# Patient Record
Sex: FEMALE | Race: WHITE | NOT HISPANIC OR LATINO
[De-identification: names, ages, dates, MRNs, and addresses within clinical notes are randomized per-mention and may not be internally consistent; named-entity substitution may affect disease eponyms.]

---

## 2017-05-09 ENCOUNTER — APPOINTMENT (OUTPATIENT)
Dept: DERMATOLOGY | Facility: CLINIC | Age: 77
End: 2017-05-09

## 2017-05-09 VITALS — HEIGHT: 63 IN | BODY MASS INDEX: 38.62 KG/M2 | WEIGHT: 218 LBS

## 2017-05-09 DIAGNOSIS — Z87.09 PERSONAL HISTORY OF OTHER DISEASES OF THE RESPIRATORY SYSTEM: ICD-10-CM

## 2017-05-09 DIAGNOSIS — Z87.891 PERSONAL HISTORY OF NICOTINE DEPENDENCE: ICD-10-CM

## 2017-05-09 DIAGNOSIS — Z86.69 PERSONAL HISTORY OF OTHER DISEASES OF THE NERVOUS SYSTEM AND SENSE ORGANS: ICD-10-CM

## 2017-09-11 ENCOUNTER — RX RENEWAL (OUTPATIENT)
Age: 77
End: 2017-09-11

## 2018-01-13 ENCOUNTER — RX RENEWAL (OUTPATIENT)
Age: 78
End: 2018-01-13

## 2018-03-06 ENCOUNTER — RX RENEWAL (OUTPATIENT)
Age: 78
End: 2018-03-06

## 2018-04-02 ENCOUNTER — MESSAGE (OUTPATIENT)
Age: 78
End: 2018-04-02

## 2018-04-02 DIAGNOSIS — Z12.31 ENCOUNTER FOR SCREENING MAMMOGRAM FOR MALIGNANT NEOPLASM OF BREAST: ICD-10-CM

## 2018-04-13 ENCOUNTER — APPOINTMENT (OUTPATIENT)
Dept: OBGYN | Facility: CLINIC | Age: 78
End: 2018-04-13
Payer: MEDICARE

## 2018-04-13 VITALS
WEIGHT: 118 LBS | BODY MASS INDEX: 20.91 KG/M2 | SYSTOLIC BLOOD PRESSURE: 108 MMHG | DIASTOLIC BLOOD PRESSURE: 62 MMHG | HEIGHT: 63 IN

## 2018-04-13 DIAGNOSIS — Z87.09 PERSONAL HISTORY OF OTHER DISEASES OF THE RESPIRATORY SYSTEM: ICD-10-CM

## 2018-04-13 PROCEDURE — 82270 OCCULT BLOOD FECES: CPT

## 2018-04-13 PROCEDURE — G0101: CPT

## 2018-04-19 LAB — CYTOLOGY CVX/VAG DOC THIN PREP: NORMAL

## 2018-04-20 ENCOUNTER — CLINICAL ADVICE (OUTPATIENT)
Age: 78
End: 2018-04-20

## 2018-04-23 ENCOUNTER — CLINICAL ADVICE (OUTPATIENT)
Age: 78
End: 2018-04-23

## 2018-04-25 ENCOUNTER — APPOINTMENT (OUTPATIENT)
Dept: MAMMOGRAPHY | Facility: CLINIC | Age: 78
End: 2018-04-25

## 2018-05-01 ENCOUNTER — FORM ENCOUNTER (OUTPATIENT)
Age: 78
End: 2018-05-01

## 2018-05-02 ENCOUNTER — APPOINTMENT (OUTPATIENT)
Dept: MAMMOGRAPHY | Facility: CLINIC | Age: 78
End: 2018-05-02
Payer: MEDICARE

## 2018-05-02 ENCOUNTER — APPOINTMENT (OUTPATIENT)
Dept: RADIOLOGY | Facility: CLINIC | Age: 78
End: 2018-05-02
Payer: MEDICARE

## 2018-05-02 ENCOUNTER — APPOINTMENT (OUTPATIENT)
Dept: ULTRASOUND IMAGING | Facility: CLINIC | Age: 78
End: 2018-05-02
Payer: MEDICARE

## 2018-05-02 ENCOUNTER — OUTPATIENT (OUTPATIENT)
Dept: OUTPATIENT SERVICES | Facility: HOSPITAL | Age: 78
LOS: 1 days | End: 2018-05-02
Payer: MEDICARE

## 2018-05-02 DIAGNOSIS — Z00.8 ENCOUNTER FOR OTHER GENERAL EXAMINATION: ICD-10-CM

## 2018-05-02 PROCEDURE — 77080 DXA BONE DENSITY AXIAL: CPT

## 2018-05-02 PROCEDURE — 77080 DXA BONE DENSITY AXIAL: CPT | Mod: 26

## 2018-05-02 PROCEDURE — 77067 SCR MAMMO BI INCL CAD: CPT | Mod: 26

## 2018-05-02 PROCEDURE — 77067 SCR MAMMO BI INCL CAD: CPT

## 2018-05-02 PROCEDURE — 77063 BREAST TOMOSYNTHESIS BI: CPT | Mod: 26

## 2018-05-02 PROCEDURE — 77063 BREAST TOMOSYNTHESIS BI: CPT

## 2018-06-15 ENCOUNTER — APPOINTMENT (OUTPATIENT)
Dept: DERMATOLOGY | Facility: CLINIC | Age: 78
End: 2018-06-15
Payer: MEDICARE

## 2018-06-15 PROCEDURE — 0050D: CPT

## 2018-06-15 PROCEDURE — 99213 OFFICE O/P EST LOW 20 MIN: CPT | Mod: 25

## 2018-06-15 PROCEDURE — 17000 DESTRUCT PREMALG LESION: CPT

## 2018-06-15 RX ORDER — ESTROGENS, CONJUGATED 0.62 MG/1
0.62 TABLET, FILM COATED ORAL
Refills: 0 | Status: DISCONTINUED | COMMUNITY
End: 2018-06-15

## 2018-06-15 RX ORDER — CONJUGATED ESTROGENS 0.62 MG/G
0.62 CREAM VAGINAL
Qty: 70 | Refills: 0 | Status: DISCONTINUED | COMMUNITY
Start: 2018-03-06 | End: 2018-06-15

## 2019-04-12 ENCOUNTER — MEDICATION RENEWAL (OUTPATIENT)
Age: 79
End: 2019-04-12

## 2019-12-20 ENCOUNTER — APPOINTMENT (OUTPATIENT)
Dept: DERMATOLOGY | Facility: CLINIC | Age: 79
End: 2019-12-20
Payer: MEDICARE

## 2019-12-20 DIAGNOSIS — L85.3 XEROSIS CUTIS: ICD-10-CM

## 2019-12-20 PROCEDURE — 99213 OFFICE O/P EST LOW 20 MIN: CPT | Mod: 25

## 2019-12-20 PROCEDURE — 17000 DESTRUCT PREMALG LESION: CPT

## 2019-12-20 NOTE — ASSESSMENT
[FreeTextEntry1] : LN2 to AK  R nose tip\par discussed Cerave lotion to face, cont am lactin to body\par Continue regular exams; Hx BCC\par Complete skin examination is negative for malignancy;

## 2019-12-20 NOTE — PHYSICAL EXAM
[Full Body Skin Exam Performed] : performed [FreeTextEntry3] : Skin examination performed of the face, neck, trunk, arms, legs; \par The patient is well, alert and oriented, pleasant and cooperative.\par Eyelids, conjunctivae, oral mucosa, digits and nails all normal.  \par No cervical adenopathy.\par \par Normal findings include:\par \par Seborrheic keratoses\par Angiomas\par Lentigines\par scaling erythematous papule; r nose tip;  nose bridge AK resolved\par \par No lesions were suspicious for malignancy. \par \par

## 2019-12-20 NOTE — HISTORY OF PRESENT ILLNESS
[de-identified] : Pt. presents for skin check;\par No itching, bleeding, growing, changing lesions noted;\par Severity:  mild  \par Modifying factors:  none\par Associated symptoms:  none\par Context:  no association with activity \par Has RA, now on Mtx; \par \par

## 2020-04-17 ENCOUNTER — APPOINTMENT (OUTPATIENT)
Dept: OBGYN | Facility: CLINIC | Age: 80
End: 2020-04-17

## 2020-06-22 ENCOUNTER — RX RENEWAL (OUTPATIENT)
Age: 80
End: 2020-06-22

## 2020-08-12 ENCOUNTER — EMERGENCY (EMERGENCY)
Facility: HOSPITAL | Age: 80
LOS: 0 days | Discharge: HOME | End: 2020-08-13
Attending: EMERGENCY MEDICINE
Payer: MEDICARE

## 2020-08-12 VITALS
HEART RATE: 80 BPM | RESPIRATION RATE: 18 BRPM | OXYGEN SATURATION: 98 % | SYSTOLIC BLOOD PRESSURE: 114 MMHG | DIASTOLIC BLOOD PRESSURE: 68 MMHG | TEMPERATURE: 99 F

## 2020-08-12 DIAGNOSIS — R93.89 ABNORMAL FINDINGS ON DIAGNOSTIC IMAGING OF OTHER SPECIFIED BODY STRUCTURES: ICD-10-CM

## 2020-08-12 DIAGNOSIS — R68.84 JAW PAIN: ICD-10-CM

## 2020-08-12 DIAGNOSIS — Z79.899 OTHER LONG TERM (CURRENT) DRUG THERAPY: ICD-10-CM

## 2020-08-12 DIAGNOSIS — Z82.3 FAMILY HISTORY OF STROKE: ICD-10-CM

## 2020-08-12 DIAGNOSIS — Z87.891 PERSONAL HISTORY OF NICOTINE DEPENDENCE: ICD-10-CM

## 2020-08-12 DIAGNOSIS — Z79.52 LONG TERM (CURRENT) USE OF SYSTEMIC STEROIDS: ICD-10-CM

## 2020-08-12 DIAGNOSIS — R07.9 CHEST PAIN, UNSPECIFIED: ICD-10-CM

## 2020-08-12 DIAGNOSIS — R10.10 UPPER ABDOMINAL PAIN, UNSPECIFIED: ICD-10-CM

## 2020-08-12 DIAGNOSIS — M06.9 RHEUMATOID ARTHRITIS, UNSPECIFIED: ICD-10-CM

## 2020-08-12 DIAGNOSIS — J84.10 PULMONARY FIBROSIS, UNSPECIFIED: ICD-10-CM

## 2020-08-12 DIAGNOSIS — I35.1 NONRHEUMATIC AORTIC (VALVE) INSUFFICIENCY: ICD-10-CM

## 2020-08-12 DIAGNOSIS — J43.9 EMPHYSEMA, UNSPECIFIED: ICD-10-CM

## 2020-08-12 DIAGNOSIS — Z98.890 OTHER SPECIFIED POSTPROCEDURAL STATES: Chronic | ICD-10-CM

## 2020-08-12 LAB
ALBUMIN SERPL ELPH-MCNC: 3.8 G/DL — SIGNIFICANT CHANGE UP (ref 3.3–5)
ALP SERPL-CCNC: 86 U/L — SIGNIFICANT CHANGE UP (ref 40–120)
ALT FLD-CCNC: 41 U/L — SIGNIFICANT CHANGE UP (ref 12–78)
ANION GAP SERPL CALC-SCNC: 5 MMOL/L — SIGNIFICANT CHANGE UP (ref 5–17)
AST SERPL-CCNC: 36 U/L — SIGNIFICANT CHANGE UP (ref 15–37)
BASOPHILS # BLD AUTO: 0.05 K/UL — SIGNIFICANT CHANGE UP (ref 0–0.2)
BASOPHILS NFR BLD AUTO: 0.5 % — SIGNIFICANT CHANGE UP (ref 0–2)
BILIRUB SERPL-MCNC: 0.4 MG/DL — SIGNIFICANT CHANGE UP (ref 0.2–1.2)
BUN SERPL-MCNC: 9 MG/DL — SIGNIFICANT CHANGE UP (ref 7–23)
CALCIUM SERPL-MCNC: 9.1 MG/DL — SIGNIFICANT CHANGE UP (ref 8.5–10.1)
CHLORIDE SERPL-SCNC: 105 MMOL/L — SIGNIFICANT CHANGE UP (ref 96–108)
CO2 SERPL-SCNC: 28 MMOL/L — SIGNIFICANT CHANGE UP (ref 22–31)
CREAT SERPL-MCNC: 0.6 MG/DL — SIGNIFICANT CHANGE UP (ref 0.5–1.3)
EOSINOPHIL # BLD AUTO: 0 K/UL — SIGNIFICANT CHANGE UP (ref 0–0.5)
EOSINOPHIL NFR BLD AUTO: 0 % — SIGNIFICANT CHANGE UP (ref 0–6)
GLUCOSE SERPL-MCNC: 96 MG/DL — SIGNIFICANT CHANGE UP (ref 70–99)
HCT VFR BLD CALC: 43 % — SIGNIFICANT CHANGE UP (ref 34.5–45)
HGB BLD-MCNC: 14.4 G/DL — SIGNIFICANT CHANGE UP (ref 11.5–15.5)
IMM GRANULOCYTES NFR BLD AUTO: 0.2 % — SIGNIFICANT CHANGE UP (ref 0–1.5)
LIDOCAIN IGE QN: 134 U/L — SIGNIFICANT CHANGE UP (ref 73–393)
LYMPHOCYTES # BLD AUTO: 2.1 K/UL — SIGNIFICANT CHANGE UP (ref 1–3.3)
LYMPHOCYTES # BLD AUTO: 21.5 % — SIGNIFICANT CHANGE UP (ref 13–44)
MAGNESIUM SERPL-MCNC: 2.3 MG/DL — SIGNIFICANT CHANGE UP (ref 1.6–2.6)
MCHC RBC-ENTMCNC: 31.6 PG — SIGNIFICANT CHANGE UP (ref 27–34)
MCHC RBC-ENTMCNC: 33.5 GM/DL — SIGNIFICANT CHANGE UP (ref 32–36)
MCV RBC AUTO: 94.5 FL — SIGNIFICANT CHANGE UP (ref 80–100)
MONOCYTES # BLD AUTO: 0.55 K/UL — SIGNIFICANT CHANGE UP (ref 0–0.9)
MONOCYTES NFR BLD AUTO: 5.6 % — SIGNIFICANT CHANGE UP (ref 2–14)
NEUTROPHILS # BLD AUTO: 7.07 K/UL — SIGNIFICANT CHANGE UP (ref 1.8–7.4)
NEUTROPHILS NFR BLD AUTO: 72.2 % — SIGNIFICANT CHANGE UP (ref 43–77)
NT-PROBNP SERPL-SCNC: 135 PG/ML — SIGNIFICANT CHANGE UP (ref 0–450)
PLATELET # BLD AUTO: 262 K/UL — SIGNIFICANT CHANGE UP (ref 150–400)
POTASSIUM SERPL-MCNC: 4.1 MMOL/L — SIGNIFICANT CHANGE UP (ref 3.5–5.3)
POTASSIUM SERPL-SCNC: 4.1 MMOL/L — SIGNIFICANT CHANGE UP (ref 3.5–5.3)
PROT SERPL-MCNC: 7.9 GM/DL — SIGNIFICANT CHANGE UP (ref 6–8.3)
RBC # BLD: 4.55 M/UL — SIGNIFICANT CHANGE UP (ref 3.8–5.2)
RBC # FLD: 14.1 % — SIGNIFICANT CHANGE UP (ref 10.3–14.5)
SODIUM SERPL-SCNC: 138 MMOL/L — SIGNIFICANT CHANGE UP (ref 135–145)
TROPONIN I SERPL-MCNC: <0.015 NG/ML — SIGNIFICANT CHANGE UP (ref 0.01–0.04)
TROPONIN I SERPL-MCNC: <0.015 NG/ML — SIGNIFICANT CHANGE UP (ref 0.01–0.04)
WBC # BLD: 9.79 K/UL — SIGNIFICANT CHANGE UP (ref 3.8–10.5)
WBC # FLD AUTO: 9.79 K/UL — SIGNIFICANT CHANGE UP (ref 3.8–10.5)

## 2020-08-12 PROCEDURE — 83690 ASSAY OF LIPASE: CPT

## 2020-08-12 PROCEDURE — 71045 X-RAY EXAM CHEST 1 VIEW: CPT

## 2020-08-12 PROCEDURE — 83735 ASSAY OF MAGNESIUM: CPT

## 2020-08-12 PROCEDURE — 83880 ASSAY OF NATRIURETIC PEPTIDE: CPT

## 2020-08-12 PROCEDURE — 78452 HT MUSCLE IMAGE SPECT MULT: CPT

## 2020-08-12 PROCEDURE — 99220: CPT

## 2020-08-12 PROCEDURE — U0003: CPT

## 2020-08-12 PROCEDURE — 71045 X-RAY EXAM CHEST 1 VIEW: CPT | Mod: 26

## 2020-08-12 PROCEDURE — 83036 HEMOGLOBIN GLYCOSYLATED A1C: CPT

## 2020-08-12 PROCEDURE — A9500: CPT

## 2020-08-12 PROCEDURE — 93010 ELECTROCARDIOGRAM REPORT: CPT

## 2020-08-12 PROCEDURE — 86769 SARS-COV-2 COVID-19 ANTIBODY: CPT

## 2020-08-12 PROCEDURE — G0378: CPT

## 2020-08-12 PROCEDURE — 93306 TTE W/DOPPLER COMPLETE: CPT

## 2020-08-12 PROCEDURE — 36415 COLL VENOUS BLD VENIPUNCTURE: CPT

## 2020-08-12 PROCEDURE — 93017 CV STRESS TEST TRACING ONLY: CPT

## 2020-08-12 PROCEDURE — 84484 ASSAY OF TROPONIN QUANT: CPT

## 2020-08-12 PROCEDURE — 76700 US EXAM ABDOM COMPLETE: CPT

## 2020-08-12 PROCEDURE — 96374 THER/PROPH/DIAG INJ IV PUSH: CPT | Mod: XU

## 2020-08-12 PROCEDURE — 99285 EMERGENCY DEPT VISIT HI MDM: CPT | Mod: 25

## 2020-08-12 PROCEDURE — 93005 ELECTROCARDIOGRAM TRACING: CPT

## 2020-08-12 PROCEDURE — 99285 EMERGENCY DEPT VISIT HI MDM: CPT

## 2020-08-12 PROCEDURE — 80061 LIPID PANEL: CPT

## 2020-08-12 PROCEDURE — 80053 COMPREHEN METABOLIC PANEL: CPT

## 2020-08-12 PROCEDURE — 85025 COMPLETE CBC W/AUTO DIFF WBC: CPT

## 2020-08-12 RX ORDER — ACETAMINOPHEN 500 MG
650 TABLET ORAL EVERY 6 HOURS
Refills: 0 | Status: DISCONTINUED | OUTPATIENT
Start: 2020-08-12 | End: 2020-08-13

## 2020-08-12 RX ORDER — ENOXAPARIN SODIUM 100 MG/ML
40 INJECTION SUBCUTANEOUS DAILY
Refills: 0 | Status: DISCONTINUED | OUTPATIENT
Start: 2020-08-12 | End: 2020-08-13

## 2020-08-12 RX ORDER — FOLIC ACID 0.8 MG
1 TABLET ORAL DAILY
Refills: 0 | Status: DISCONTINUED | OUTPATIENT
Start: 2020-08-12 | End: 2020-08-13

## 2020-08-12 RX ORDER — ASPIRIN/CALCIUM CARB/MAGNESIUM 324 MG
162 TABLET ORAL ONCE
Refills: 0 | Status: COMPLETED | OUTPATIENT
Start: 2020-08-12 | End: 2020-08-12

## 2020-08-12 RX ADMIN — Medication 1 MILLIGRAM(S): at 22:39

## 2020-08-12 RX ADMIN — ENOXAPARIN SODIUM 40 MILLIGRAM(S): 100 INJECTION SUBCUTANEOUS at 22:39

## 2020-08-12 RX ADMIN — Medication 162 MILLIGRAM(S): at 18:19

## 2020-08-12 NOTE — ED ADULT TRIAGE NOTE - CHIEF COMPLAINT QUOTE
Patient sent in for abnormal ekg from pmd. Patient also endorses episode of CP/jaw pain this morning that has since resolved. patient denies sob at this time.

## 2020-08-12 NOTE — ED STATDOCS - CARE PLAN
Principal Discharge DX:	Chest pain radiating to jaw  Secondary Diagnosis:	Chest pain with moderate risk for cardiac etiology

## 2020-08-12 NOTE — ED STATDOCS - CLINICAL SUMMARY MEDICAL DECISION MAKING FREE TEXT BOX
Reviewed results with mother. chest pain radiating to jaw after exertion, EKG with q waves in v1 and v2, heart score of 6 will need admission.

## 2020-08-12 NOTE — ED STATDOCS - OBJECTIVE STATEMENT
Pertinent HPI/PMH/PSH/FHx/SHx and Review of Systems contained within  HPI: 80 y/o female Patient p/w CC b/l lower anterior chest pain (below b/l breast) starting today at 9am, episode lasted for 15 minutes. Also had discomfort to jaw, nose and head. States pain started while she was getting ready for her trip, but pain started when she was sitting down. Saw PMD today and had an "abnormal EKG" and sent to the ED.    PMH/PSH relevant for: RA on Prednisone and methotrexate, Emphysema, Last stress test in 2010   ROS negative for: fever, Chest pain, diaphoresis, SOB, Nausea, vomiting, diarrhea, abdominal pain, dysuria    FamilyHx and SocialHx not otherwise contributory Pertinent HPI/PMH/PSH/FHx/SHx and Review of Systems contained within  HPI: 78 y/o female Patient p/w CC b/l lower anterior chest pain (below b/l breast) radiating to jaw and head starting today at 9am, episode lasted for 15 minutes. States pain started while she was getting ready for her trip, but pain started when she was sitting down. Saw PMD today and had an "abnormal EKG" and sent to the ED.    PMH/PSH relevant for: RA on Prednisone and methotrexate, Emphysema, Last stress test in 2010   ROS negative for: fever, diaphoresis, SOB, Nausea, vomiting, diarrhea, abdominal pain, dysuria    FamilyHx and SocialHx not otherwise contributory

## 2020-08-12 NOTE — H&P ADULT - ASSESSMENT
78 yo female with PMH of RA on methotrexate, emphysema presented with:    #Atypical chest pain - r/o ACS  - place in observation on tele  - serial cardiac enzymes  - repeat EKG in AM  - echo  - cardio eval in AM    #RA  - continue prednsine and folic acid  - on methotrexate weekly (Thursdays)    #DVT prophylaxis  - lovenox    #Advance Directives  - pt states she is DNR, attempted to complete MOLST but states wants to discuss with daughter who is a  prior to completing form      IMPROVE VTE Individual Risk Assessment    RISK                                                                Points    [  ] Previous VTE                                                  3    [  ] Thrombophilia                                               2    [  ] Lower limb paralysis                                      2        (unable to hold up >15 seconds)      [  ] Current Cancer                                              2         (within 6 months)    [  ] Immobilization > 24 hrs                                1    [  ] ICU/CCU stay > 24 hours                              1    [ x ] Age > 60                                                      1    IMPROVE VTE Score ____1_____    IMPROVE Score 0-1: Low Risk, No VTE prophylaxis required for most patients, encourage ambulation.   IMPROVE Score 2-3: At risk, pharmacologic VTE prophylaxis is indicated for most patients (in the absence of a contraindication)  IMPROVE Score > or = 4: High Risk, pharmacologic VTE prophylaxis is indicated for most patients (in the absence of a contraindication)

## 2020-08-12 NOTE — H&P ADULT - NSHPPHYSICALEXAM_GEN_ALL_CORE
Vital Signs Last 24 Hrs  T(C): 36.6 (12 Aug 2020 20:51), Max: 37.3 (12 Aug 2020 16:36)  T(F): 97.8 (12 Aug 2020 20:51), Max: 99.1 (12 Aug 2020 16:36)  HR: 84 (12 Aug 2020 20:51) (80 - 84)  BP: 124/63 (12 Aug 2020 20:51) (114/68 - 124/63)  BP(mean): 82 (12 Aug 2020 16:36) (82 - 82)  RR: 17 (12 Aug 2020 20:51) (17 - 18)  SpO2: 98% (12 Aug 2020 20:51) (98% - 98%)

## 2020-08-12 NOTE — ED ADULT NURSE NOTE - OBJECTIVE STATEMENT
pt presents to ED with complaints of chest pain since this am. pt states she went to [facility] today and was told to come to ED for further eval due to abnormal EKG. 18 g placed to left FA. labs sent. EKG performed. Tele monitoring initiated.

## 2020-08-12 NOTE — H&P ADULT - HISTORY OF PRESENT ILLNESS
80 yo female with PMH of RA on methotrexate, emphysema presents with chest pain. Pt states this morning around 9AM she developed chest pain. Chest pain described as a discomfort around b/l breast which radiated up to her upper jaw and head. Episode lasted about 15 minutes. Had mild palpitations. She does not recall if she was diaphoretic. She denies any SOB, dizziness, nausea, vomiting, syncope. She went to see her PCP and was told she had an abnormal EKG and sent to the ED for evaluation. She saw cardio Dr. Torres in the past many years ago and states she had a normal stress test, has not followed up after.

## 2020-08-12 NOTE — ED STATDOCS - PROGRESS NOTE DETAILS
jak: Discussed need to admit with patient & discussed risk and benefits.  Patient agreed to admission.  Discussed case w/ admitting doctor - agreed to admit to their service. will place bridge orders. Accepting physician APPROVED PT TO MOVE   prior to inpatient team evaluation

## 2020-08-13 ENCOUNTER — TRANSCRIPTION ENCOUNTER (OUTPATIENT)
Age: 80
End: 2020-08-13

## 2020-08-13 VITALS
HEART RATE: 87 BPM | TEMPERATURE: 98 F | DIASTOLIC BLOOD PRESSURE: 76 MMHG | OXYGEN SATURATION: 98 % | SYSTOLIC BLOOD PRESSURE: 107 MMHG | RESPIRATION RATE: 16 BRPM

## 2020-08-13 LAB
A1C WITH ESTIMATED AVERAGE GLUCOSE RESULT: 5.6 % — SIGNIFICANT CHANGE UP (ref 4–5.6)
CHOLEST SERPL-MCNC: 217 MG/DL — HIGH (ref 10–199)
ESTIMATED AVERAGE GLUCOSE: 114 MG/DL — SIGNIFICANT CHANGE UP (ref 68–114)
HDLC SERPL-MCNC: 102 MG/DL — SIGNIFICANT CHANGE UP
LIPID PNL WITH DIRECT LDL SERPL: 100 MG/DL — SIGNIFICANT CHANGE UP
SARS-COV-2 IGG SERPL QL IA: NEGATIVE — SIGNIFICANT CHANGE UP
SARS-COV-2 IGM SERPL IA-ACNC: <3.8 AU/ML — SIGNIFICANT CHANGE UP
SARS-COV-2 RNA SPEC QL NAA+PROBE: SIGNIFICANT CHANGE UP
TOTAL CHOLESTEROL/HDL RATIO MEASUREMENT: 2.1 RATIO — LOW (ref 3.3–7.1)
TRIGL SERPL-MCNC: 75 MG/DL — SIGNIFICANT CHANGE UP (ref 10–149)
TROPONIN I SERPL-MCNC: <0.015 NG/ML — SIGNIFICANT CHANGE UP (ref 0.01–0.04)

## 2020-08-13 PROCEDURE — 76700 US EXAM ABDOM COMPLETE: CPT | Mod: 26

## 2020-08-13 PROCEDURE — 99217: CPT

## 2020-08-13 PROCEDURE — 93016 CV STRESS TEST SUPVJ ONLY: CPT

## 2020-08-13 PROCEDURE — 93018 CV STRESS TEST I&R ONLY: CPT

## 2020-08-13 PROCEDURE — 93010 ELECTROCARDIOGRAM REPORT: CPT

## 2020-08-13 PROCEDURE — 78452 HT MUSCLE IMAGE SPECT MULT: CPT | Mod: 26

## 2020-08-13 PROCEDURE — 93306 TTE W/DOPPLER COMPLETE: CPT | Mod: 26

## 2020-08-13 RX ORDER — REGADENOSON 0.08 MG/ML
0.4 INJECTION, SOLUTION INTRAVENOUS ONCE
Refills: 0 | Status: COMPLETED | OUTPATIENT
Start: 2020-08-13 | End: 2020-08-13

## 2020-08-13 RX ADMIN — Medication 1 MILLIGRAM(S): at 12:28

## 2020-08-13 RX ADMIN — Medication 5 MILLIGRAM(S): at 12:29

## 2020-08-13 RX ADMIN — REGADENOSON 0.4 MILLIGRAM(S): 0.08 INJECTION, SOLUTION INTRAVENOUS at 09:50

## 2020-08-13 NOTE — DISCHARGE NOTE PROVIDER - CARE PROVIDER_API CALL
Antonio Torres  CARDIOVASCULAR DISEASE  [address] [place] [address]  Phone: [phone]  Fax: [phone]  Follow Up Time:     Wally Whiteside  INTERNAL MEDICINE  [address] [place] [address]  Phone: [phone]  Fax: [phone]  Follow Up Time:     Khurram House  INTERNAL MEDICINE  [address] [place] [address]  Phone: [phone]  Fax: [phone]  Follow Up Time:

## 2020-08-13 NOTE — DISCHARGE NOTE PROVIDER - PROVIDER TOKENS
PROVIDER:[TOKEN:[[id number]]],PROVIDER:[TOKEN:[[id number]]],PROVIDER:[TOKEN:[[id number]]]

## 2020-08-13 NOTE — DISCHARGE NOTE NURSING/CASE MANAGEMENT/SOCIAL WORK - PATIENT PORTAL LINK FT
You can access the FollowMyHealth Patient Portal offered by [facility] by registering at the following website: [email or web address]. By joining [facility]’s FollowMyHealth portal, you will also be able to view your health information using other applications (apps) compatible with our system.

## 2020-08-13 NOTE — DISCHARGE NOTE PROVIDER - NSDCCPCAREPLAN_GEN_ALL_CORE_FT
PRINCIPAL DISCHARGE DIAGNOSIS  Diagnosis: Chest pain radiating to jaw  Assessment and Plan of Treatment: nuclear stress test - normal  TTE - unofficial result - moderate AR - f/u with cardiology  - dr. Torres      SECONDARY DISCHARGE DIAGNOSES  Diagnosis: Arthritis  Assessment and Plan of Treatment: RA - chronic  cont. home meds - methotrexate and prednisone    Diagnosis: Pulmonary fibrosis  Assessment and Plan of Treatment: due to chronic emphysema  following with Dr. House    Diagnosis: Nodule of kidney  Assessment and Plan of Treatment: 7 mg right kidney nodule r/o renal cell carcinoma  incidental finding, radiology recommends follow up with CT   pt. chose to follow up oupatient  follow with nephrology - Dr. Whiteside

## 2020-08-13 NOTE — DISCHARGE NOTE PROVIDER - NSDCMRMEDTOKEN_GEN_ALL_CORE_FT
folic acid 1 mg oral tablet: 5 tab(s) orally once a day  Methotrexate 50mg/2mL soln: 0.8 milliliter(s) subcutaneously once a week on Thursday  predniSONE 5 mg oral tablet: 1 tab(s) orally once a day  TheraTears ophthalmic solution: 1 drop(s) in each eye 2 times a day, As Needed

## 2020-08-13 NOTE — CONSULT NOTE ADULT - SUBJECTIVE AND OBJECTIVE BOX
80 yo female with PMH of RA on methotrexate, emphysema presents with chest pain. Pt states this morning around 9AM she developed chest pain. Chest pain described as a discomfort around b/l breast which radiated up to her upper jaw and head. Episode lasted about 15 minutes. Had mild palpitations. She does not recall if she was diaphoretic. She denies any SOB, dizziness, nausea, vomiting, syncope. She went to see her PCP and was told she had an abnormal EKG and sent to the ED for evaluation. She saw cardio Dr. Torres in the past many years ago and states she had a normal stress test, has not followed up after. (12 Aug 2020 21:09)    8/13:  describes upper abdominal pain yesterday and discomfort at the upper chest. Lasted around 15 minutes. Happened after eating cereal for breakfast.       PAST MEDICAL & SURGICAL HISTORY:  Emphysema of lung  RA (rheumatoid arthritis)  H/O lumpectomy    MEDICATIONS  (STANDING):  enoxaparin Injectable 40 milliGRAM(s) SubCutaneous daily  folic acid 1 milliGRAM(s) Oral daily  predniSONE   Tablet 5 milliGRAM(s) Oral daily    MEDICATIONS  (PRN):  acetaminophen   Tablet .. 650 milliGRAM(s) Oral every 6 hours PRN Mild Pain (1 - 3)    Allergies    No Known Allergies    Intolerances      FAMILY HISTORY:  Family history of CVA: mother        REVIEW OF SYSTEMS:    CONSTITUTIONAL: No weakness, fevers or chills  EYES/ENT: No visual changes;  No vertigo or throat pain   NECK: No pain or stiffness  RESPIRATORY: No cough, wheezing, hemoptysis; No shortness of breath  CARDIOVASCULAR: No chest pain or palpitations  GASTROINTESTINAL: No abdominal or epigastric pain. No nausea, vomiting, or hematemesis; No diarrhea or constipation. No melena or hematochezia.  GENITOURINARY: No dysuria, frequency or hematuria  NEUROLOGICAL: No numbness or weakness  SKIN: No itching, burning, rashes, or lesions   All other review of systems is negative unless indicated above      PHYSICAL EXAM:  Daily Height in cm: 161.29 (12 Aug 2020 21:26)    Daily   Vital Signs Last 24 Hrs  T(C): 36.8 (13 Aug 2020 10:56), Max: 37.3 (12 Aug 2020 16:36)  T(F): 98.2 (13 Aug 2020 10:56), Max: 99.1 (12 Aug 2020 16:36)  HR: 94 (13 Aug 2020 10:56) (77 - 94)  BP: 121/78 (13 Aug 2020 10:56) (91/56 - 131/74)  BP(mean): 82 (12 Aug 2020 16:36) (82 - 82)  RR: 16 (13 Aug 2020 10:56) (16 - 18)  SpO2: 97% (13 Aug 2020 10:56) (94% - 98%)    Constitutional: NAD, awake and alert, well-developed  HEENT: PERR, EOMI, Normal Hearing, MMM  Neck: Soft and supple, No LAD, No JVD  Respiratory: Breath sounds are clear bilaterally, No wheezing, rales or rhonchi  Cardiovascular: S1 and S2, regular rate and rhythm, no Murmurs, gallops or rubs  Gastrointestinal: Bowel Sounds present, soft, nontender, nondistended, no guarding, no rebound  Extremities: No peripheral edema  Vascular: 2+ peripheral pulses  Neurological: A/O x 3, no focal deficits  Musculoskeletal: 5/5 strength b/l upper and lower extremities  Skin: No rashes    LABS: All Labs Reviewed:                        14.4   9.79  )-----------( 262      ( 12 Aug 2020 17:12 )             43.0     08-12    138  |  105  |  9   ----------------------------<  96  4.1   |  28  |  0.60    Ca    9.1      12 Aug 2020 17:12  Mg     2.3     08-12    TPro  7.9  /  Alb  3.8  /  TBili  0.4  /  DBili  x   /  AST  36  /  ALT  41  /  AlkPhos  86  08-12      CARDIAC MARKERS ( 13 Aug 2020 01:59 )  <0.015 ng/mL / x     / x     / x     / x      CARDIAC MARKERS ( 12 Aug 2020 20:24 )  <0.015 ng/mL / x     / x     / x     / x      CARDIAC MARKERS ( 12 Aug 2020 17:12 )  <0.015 ng/mL / x     / x     / x     / x          Blood Culture:         EKG: NSR, Normal EKG    CARDIOLOGY TESTING:  RADIOLOGY: < from: NM Nuclear Stress Pharmacologic Multiple (08.13.20 @ 10:49) >  EXAM:  NM NUCLEAR STRESS MULTI PHARM                            PROCEDURE DATE:  08/13/2020          INTERPRETATION:  RADIOPHARMACEUTICAL: 12.2 mCi 99mTc-sestamibi IV at rest and 29.8 mCi 99mTc-sestamibi IV at stress    CLINICAL INFORMATION: 79 year old female  with chest pain; referred to evaluate cardiac function.    STRESS PROTOCOL: Intravenous Regadenoson 5cc (0.4 mg) was given rapidly over 10 seconds. Technetium sestamibi was injected immediately thereafter. A 12-lead EKG was recorded every minute and blood pressure was also recorded throughout the test. The study was stopped when the protocol was completed.    TECHNIQUE:  At rest, 12.2 mCi 99m Tc- sestamibi was injected intravenously and gated SPECT myocardial perfusion imaging was performed 45 minutes later. Immediately following the intravenous injection of Regadenoson, 29.8 mCi 99m-Technetium sestamibi was injected intravenously and gated SPECT myocardial perfusion imaging was performed 45 minutes later. Data were reconstructedin the cardiac standard short axis, horizontal long axis and vertical long axis projections.    COMPARISON: No previous myocardial perfusion scan for comparison    FINDINGS: The technical quality of the resting and post-stress perfusion images was satisfactory.  Review of resting and post-stress myocardial scintigrams revealed normal left ventricular perfusion. There was no evidence of reversible or fixed perfusion defects.    Gated wall motion study revealed normal left ventricular contractility. There were no regional wall motion abnormalities or regions of decreased wall thickening. There was no paradoxical septal motion.    Calculated left ventricular ejection fraction post-stress was 81%, based on a left ventricular end diastolic volume of 59 mL and an end systolic volume of 11 mL. Stroke volume was 48 mL.    IMPRESSION: Normal SPECT Myocardial Perfusion Imaging.    No scan evidence of reversible or fixed perfusion defects.    Normal left ventricular contractility with an ejection fraction of 81% (Normal: 50% or greater).    No regional wall motion abnormalities.    Please refer to cardiac stress test report for dosage of Regadenoson administered, EKG findings and symptoms during the procedure. .    < end of copied text >

## 2020-08-13 NOTE — DISCHARGE NOTE PROVIDER - NSDCCPTREATMENT_GEN_ALL_CORE_FT
PRINCIPAL PROCEDURE  Procedure: Nuclear medicine cardiopulmonary stress test  Findings and Treatment:   IMPRESSION: Normal SPECT Myocardial Perfusion Imaging.  No scan evidence of reversible or fixed perfusion defects.  Normal left ventricular contractility with an ejection fraction of 81% (Normal: 50% or greater).  No regional wall motion abnormalities.        SECONDARY PROCEDURE  Procedure: CXR, single AP view  Findings and Treatment:   EXAM:  XR CHEST 1 VIEW                        PROCEDURE DATE:  08/12/2020    INTERPRETATION:  Portable chest radiograph  CLINICAL INFORMATION: Chest pain  TECHNIQUE:  Portable  AP view of the chest was obtained.  COMPARISON: No previous examinations are available for review.  FINDINGS:  Heart and mediastinum within normal limits.  There are prominent bronchovascular markings in the upper lobes with a peripheral apical fibrosis and RIGHT lung base CP angle fibrosis. No large airspace consolidation, effusion or pneumothorax. I  Visualized osseous structures are intact.  IMPRESSION: Bilateral apical pleural thickening and upper lobe prominent bronchovascular markings.  RIGHT lung base CP angle reticular opacities. Findings Consistent with pulmonary fibrosis.      Procedure: Complete abdominal ultrasound  Findings and Treatment: EXAM:  US ABDOMEN COMPLETE                        PROCEDURE DATE:  08/13/2020    INTERPRETATION:  CLINICAL INFORMATION: Upper abdominal pain  COMPARISON: None available.  TECHNIQUE: Sonography of the abdomen.  FINDINGS:  Liver: Normal echogenicity and echotexture.  Smooth surface contour.  No focal lesion. Patent main portal vein, with hepatopedal flow.  Bile ducts: Normal caliber. Common bile duct measures 4 mm.  Gallbladder: 5 mm nonshadowing echogenic focus which does not move when the patient was scanned in the decubitus position, most likely a polyp. No stones, wall thickening or pericholecystic fluid. Negative sonographic Viramontes's sign.  Pancreas: Visualized portions are within normal limits.  Spleen: 6.8 cm. Within normal limits.  Right kidney: 10.8 cm. 0.7 cm echogenic nodule in the upper pole..  Left kidney: 11.6 cm.  No hydronephrosis.  Ascites: None.  Aorta and IVC: Atherosclerotic disease in the aorta. No aneurysm.  IMPRESSION:  No gallstones or evidence of acute cholecystitis.  7 mm echogenic right renal nodule. The differential diagnosis is benign angiomyolipoma versus renal cell carcinoma. Recommend further evaluation with CT.

## 2020-08-13 NOTE — CONSULT NOTE ADULT - ASSESSMENT
79 year old female admitted with chest pain.   Normal stress test.     8/13  recommend an echo and an abdominal US to rule out gallbladder disease.

## 2020-08-13 NOTE — DISCHARGE NOTE PROVIDER - NSDCFUADDINST_GEN_ALL_CORE_FT
follow up with cardiology - Dr. Torres  follow up with nephrology - Dr. Whiteside for evaluation of incidental finding on abdominal ultrasound - 7 mm right kidney nodule  follow up with pulmonology - Dr. House

## 2020-08-13 NOTE — DISCHARGE NOTE PROVIDER - CARE PROVIDERS DIRECT ADDRESSES
[email or web address]

## 2020-10-14 PROBLEM — M06.9 RHEUMATOID ARTHRITIS, UNSPECIFIED: Chronic | Status: ACTIVE | Noted: 2020-08-12

## 2020-10-26 ENCOUNTER — APPOINTMENT (OUTPATIENT)
Dept: DERMATOLOGY | Facility: CLINIC | Age: 80
End: 2020-10-26
Payer: MEDICARE

## 2020-10-26 VITALS — BODY MASS INDEX: 20.91 KG/M2 | HEIGHT: 63 IN | WEIGHT: 118 LBS

## 2020-10-26 DIAGNOSIS — C44.310 BASAL CELL CARCINOMA OF SKIN OF UNSPECIFIED PARTS OF FACE: ICD-10-CM

## 2020-10-26 DIAGNOSIS — L57.0 ACTINIC KERATOSIS: ICD-10-CM

## 2020-10-26 PROCEDURE — 17003 DESTRUCT PREMALG LES 2-14: CPT

## 2020-10-26 PROCEDURE — 99213 OFFICE O/P EST LOW 20 MIN: CPT | Mod: 25

## 2020-10-26 PROCEDURE — 17000 DESTRUCT PREMALG LESION: CPT

## 2020-10-26 RX ORDER — CICLOPIROX OLAMINE 7.7 MG/ML
0.77 SUSPENSION TOPICAL
Qty: 120 | Refills: 0 | Status: DISCONTINUED | COMMUNITY
Start: 2017-09-11 | End: 2020-10-26

## 2020-10-26 RX ORDER — PREDNISONE 5 MG/1
5 TABLET ORAL
Qty: 60 | Refills: 0 | Status: COMPLETED | COMMUNITY
Start: 2020-07-31

## 2020-10-26 RX ORDER — METHOTREXATE 2.5 MG/1
TABLET ORAL
Refills: 0 | Status: DISCONTINUED | COMMUNITY
End: 2020-10-26

## 2020-10-26 RX ORDER — LEUCOVORIN CALCIUM 10 MG/1
10 TABLET ORAL
Refills: 0 | Status: DISCONTINUED | COMMUNITY
End: 2020-10-26

## 2020-10-26 NOTE — HISTORY OF PRESENT ILLNESS
[FreeTextEntry1] : Patient presents for skin examination. [de-identified] : Denies new, changing, bleeding or tender lesions on the skin over the past year.\par

## 2020-10-26 NOTE — PHYSICAL EXAM
[Alert] : alert [Oriented x 3] : ~L oriented x 3 [Well Nourished] : well nourished [Full Body Skin Exam Performed] : performed [FreeTextEntry3] : A full skin exam was performed including the scalp, face, neck, chest, abdomen, back, buttocks, upper extremities and lower extremities.  The patient declined examination of the breasts and genitalia.  \par The exam did show the following benign growths:\par Lincoln pigmented nevi.\par Seborrheic keratoses.\par Lentigines.\par \par Keratotic papules, on erythematous base, left thigh x 2 and left shoulder.\par \par

## 2020-10-29 ENCOUNTER — APPOINTMENT (OUTPATIENT)
Dept: OBGYN | Facility: CLINIC | Age: 80
End: 2020-10-29
Payer: MEDICARE

## 2020-10-29 VITALS
WEIGHT: 120 LBS | DIASTOLIC BLOOD PRESSURE: 60 MMHG | HEIGHT: 63 IN | RESPIRATION RATE: 16 BRPM | BODY MASS INDEX: 21.26 KG/M2 | SYSTOLIC BLOOD PRESSURE: 108 MMHG | TEMPERATURE: 98.1 F

## 2020-10-29 PROCEDURE — 99397 PER PM REEVAL EST PAT 65+ YR: CPT | Mod: GY

## 2020-10-29 PROCEDURE — G0101: CPT

## 2020-10-29 NOTE — PLAN
[FreeTextEntry1] : PATIENT PRESENTS FOR EVALUATION OF PELVIC ORGAN PROLAPSE. CLINICAL FINDINGS DISCUSSED. NON SURGICAL OPTIONS INCLUDING PESSARY USAGE DISCUSSED. SURGICAL OPTIONS OF VAGINAL VS. LAPAROSCOPICALLY ASSISTED SURGERY DISCUSSED. THE OPTIONS OF OVARIAN PRESERVATION DISCUSSED WITH RISKS AND BENEFITS. SURGERY EXPLAINED IN LAY TERMS WITH ILLUSTRATION.

## 2020-10-29 NOTE — COUNSELING
[Nutrition/ Exercise/ Weight Management] : nutrition, exercise, weight management [Body Image] : body image [Vitamins/Supplements] : vitamins/supplements [Breast Self Exam] : breast self exam [Bladder Hygiene] : bladder hygiene

## 2020-10-29 NOTE — PHYSICAL EXAM
[Appropriately responsive] : appropriately responsive [Alert] : alert [No Acute Distress] : no acute distress [No Lymphadenopathy] : no lymphadenopathy [Regular Rate Rhythm] : regular rate rhythm [No Murmurs] : no murmurs [Clear to Auscultation B/L] : clear to auscultation bilaterally [Soft] : soft [Non-tender] : non-tender [Non-distended] : non-distended [No HSM] : No HSM [No Lesions] : no lesions [No Mass] : no mass [Oriented x3] : oriented x3 [Examination Of The Breasts] : a normal appearance [No Masses] : no breast masses were palpable [Labia Majora] : normal [Labia Minora] : normal [Cystocele] : a cystocele [Uterine Prolapse] : uterine prolapse [Normal] : normal [Uterine Adnexae] : normal

## 2021-01-07 ENCOUNTER — APPOINTMENT (OUTPATIENT)
Dept: GASTROENTEROLOGY | Facility: CLINIC | Age: 81
End: 2021-01-07
Payer: MEDICARE

## 2021-01-07 PROCEDURE — 99202 OFFICE O/P NEW SF 15 MIN: CPT | Mod: 95

## 2021-01-07 NOTE — HISTORY OF PRESENT ILLNESS
[de-identified] : Ms. MAURA BARKER is a 80 year old female presents for screening colonoscopy. Patient has no complaints of bowel issues, bleeding, abdominal pain. Patient has family history of colon cancer. Patient had last colonoscopy in 2015. No significant changes in medical history.

## 2021-01-07 NOTE — ASSESSMENT
[FreeTextEntry1] : 79 yo female screening colonoscopy with family history of colon cancer. 
Statement Selected

## 2021-01-07 NOTE — REASON FOR VISIT
[Home] : at home, [unfilled] , at the time of the visit. [Medical Office: ([facility])___] : at the medical office located in  [Verbal consent obtained from patient] : the patient, [unfilled] [Procedure: _________] : a [unfilled] procedure visit

## 2021-01-12 ENCOUNTER — APPOINTMENT (OUTPATIENT)
Dept: OBGYN | Facility: CLINIC | Age: 81
End: 2021-01-12
Payer: MEDICARE

## 2021-01-12 VITALS
HEIGHT: 63.5 IN | DIASTOLIC BLOOD PRESSURE: 64 MMHG | SYSTOLIC BLOOD PRESSURE: 116 MMHG | WEIGHT: 120 LBS | BODY MASS INDEX: 21 KG/M2

## 2021-01-12 PROCEDURE — 99214 OFFICE O/P EST MOD 30 MIN: CPT | Mod: 25

## 2021-01-12 PROCEDURE — 57160 INSERT PESSARY/OTHER DEVICE: CPT

## 2021-01-12 NOTE — PLAN
[FreeTextEntry1] : PATIENT PRESENTS FOR EVALUATION OF PELVIC ORGAN PROLAPSE. CLINICAL FINDINGS DISCUSSED. NON SURGICAL OPTIONS INCLUDING PESSARY USAGE DISCUSSED. SURGICAL OPTIONS OF VAGINAL VS. LAPAROSCOPICALLY ASSISTED SURGERY DISCUSSED. FITTED WITH #3 RING PESSARY WITH SUPPORT

## 2021-01-14 ENCOUNTER — APPOINTMENT (OUTPATIENT)
Dept: OBGYN | Facility: CLINIC | Age: 81
End: 2021-01-14
Payer: MEDICARE

## 2021-01-14 ENCOUNTER — TRANSCRIPTION ENCOUNTER (OUTPATIENT)
Age: 81
End: 2021-01-14

## 2021-01-14 VITALS
BODY MASS INDEX: 21 KG/M2 | RESPIRATION RATE: 16 BRPM | HEIGHT: 63.5 IN | TEMPERATURE: 98.4 F | WEIGHT: 120 LBS | SYSTOLIC BLOOD PRESSURE: 126 MMHG | DIASTOLIC BLOOD PRESSURE: 62 MMHG

## 2021-01-14 DIAGNOSIS — M06.9 RHEUMATOID ARTHRITIS, UNSPECIFIED: ICD-10-CM

## 2021-01-14 DIAGNOSIS — Z46.89 ENCOUNTER FOR FITTING AND ADJUSTMENT OF OTHER SPECIFIED DEVICES: ICD-10-CM

## 2021-01-14 PROCEDURE — 57160 INSERT PESSARY/OTHER DEVICE: CPT

## 2021-01-14 PROCEDURE — 99213 OFFICE O/P EST LOW 20 MIN: CPT | Mod: 25

## 2021-01-25 ENCOUNTER — TRANSCRIPTION ENCOUNTER (OUTPATIENT)
Age: 81
End: 2021-01-25

## 2021-01-26 ENCOUNTER — TRANSCRIPTION ENCOUNTER (OUTPATIENT)
Age: 81
End: 2021-01-26

## 2021-03-04 ENCOUNTER — APPOINTMENT (OUTPATIENT)
Dept: OBGYN | Facility: CLINIC | Age: 81
End: 2021-03-04
Payer: MEDICARE

## 2021-03-04 VITALS
SYSTOLIC BLOOD PRESSURE: 110 MMHG | HEIGHT: 63.5 IN | TEMPERATURE: 97.4 F | BODY MASS INDEX: 19.95 KG/M2 | RESPIRATION RATE: 16 BRPM | WEIGHT: 114 LBS | DIASTOLIC BLOOD PRESSURE: 60 MMHG

## 2021-03-04 PROCEDURE — 99212 OFFICE O/P EST SF 10 MIN: CPT

## 2021-03-04 NOTE — PLAN
[FreeTextEntry1] : PESSARY REMOVED AND CLEANED. PT DENIES PROBLEMS WITH PESSARY. PESSARY REPLACED. INSTRUCTED ON CARE. PT TO CALL WITH ANY BLEEDING

## 2021-03-10 ENCOUNTER — NON-APPOINTMENT (OUTPATIENT)
Age: 81
End: 2021-03-10

## 2021-03-11 ENCOUNTER — APPOINTMENT (OUTPATIENT)
Dept: OBGYN | Facility: CLINIC | Age: 81
End: 2021-03-11

## 2021-03-11 ENCOUNTER — APPOINTMENT (OUTPATIENT)
Dept: OBGYN | Facility: CLINIC | Age: 81
End: 2021-03-11
Payer: MEDICARE

## 2021-03-11 VITALS
SYSTOLIC BLOOD PRESSURE: 118 MMHG | TEMPERATURE: 97.8 F | WEIGHT: 114 LBS | HEIGHT: 63.5 IN | DIASTOLIC BLOOD PRESSURE: 72 MMHG | BODY MASS INDEX: 19.95 KG/M2 | RESPIRATION RATE: 16 BRPM

## 2021-03-11 PROCEDURE — 99214 OFFICE O/P EST MOD 30 MIN: CPT

## 2021-03-18 ENCOUNTER — OUTPATIENT (OUTPATIENT)
Dept: OUTPATIENT SERVICES | Facility: HOSPITAL | Age: 81
LOS: 1 days | End: 2021-03-18
Payer: MEDICARE

## 2021-03-18 VITALS
HEART RATE: 88 BPM | RESPIRATION RATE: 16 BRPM | TEMPERATURE: 98 F | OXYGEN SATURATION: 100 % | DIASTOLIC BLOOD PRESSURE: 75 MMHG | SYSTOLIC BLOOD PRESSURE: 107 MMHG | HEIGHT: 63 IN | WEIGHT: 113.1 LBS

## 2021-03-18 DIAGNOSIS — N95.2 POSTMENOPAUSAL ATROPHIC VAGINITIS: ICD-10-CM

## 2021-03-18 DIAGNOSIS — Z98.890 OTHER SPECIFIED POSTPROCEDURAL STATES: Chronic | ICD-10-CM

## 2021-03-18 DIAGNOSIS — Z01.818 ENCOUNTER FOR OTHER PREPROCEDURAL EXAMINATION: ICD-10-CM

## 2021-03-18 DIAGNOSIS — Z98.49 CATARACT EXTRACTION STATUS, UNSPECIFIED EYE: Chronic | ICD-10-CM

## 2021-03-18 LAB
ANION GAP SERPL CALC-SCNC: 4 MMOL/L — LOW (ref 5–17)
APPEARANCE UR: CLEAR — SIGNIFICANT CHANGE UP
BASOPHILS # BLD AUTO: 0.05 K/UL — SIGNIFICANT CHANGE UP (ref 0–0.2)
BASOPHILS NFR BLD AUTO: 0.7 % — SIGNIFICANT CHANGE UP (ref 0–2)
BILIRUB UR-MCNC: NEGATIVE — SIGNIFICANT CHANGE UP
BUN SERPL-MCNC: 8 MG/DL — SIGNIFICANT CHANGE UP (ref 7–23)
CALCIUM SERPL-MCNC: 9.5 MG/DL — SIGNIFICANT CHANGE UP (ref 8.5–10.1)
CHLORIDE SERPL-SCNC: 105 MMOL/L — SIGNIFICANT CHANGE UP (ref 96–108)
CO2 SERPL-SCNC: 28 MMOL/L — SIGNIFICANT CHANGE UP (ref 22–31)
COLOR SPEC: YELLOW — SIGNIFICANT CHANGE UP
CREAT SERPL-MCNC: 0.6 MG/DL — SIGNIFICANT CHANGE UP (ref 0.5–1.3)
DIFF PNL FLD: ABNORMAL
EOSINOPHIL # BLD AUTO: 0.04 K/UL — SIGNIFICANT CHANGE UP (ref 0–0.5)
EOSINOPHIL NFR BLD AUTO: 0.6 % — SIGNIFICANT CHANGE UP (ref 0–6)
GLUCOSE SERPL-MCNC: 82 MG/DL — SIGNIFICANT CHANGE UP (ref 70–99)
GLUCOSE UR QL: NEGATIVE MG/DL — SIGNIFICANT CHANGE UP
HCT VFR BLD CALC: 41.5 % — SIGNIFICANT CHANGE UP (ref 34.5–45)
HGB BLD-MCNC: 13.4 G/DL — SIGNIFICANT CHANGE UP (ref 11.5–15.5)
IMM GRANULOCYTES NFR BLD AUTO: 0.1 % — SIGNIFICANT CHANGE UP (ref 0–1.5)
KETONES UR-MCNC: NEGATIVE — SIGNIFICANT CHANGE UP
LEUKOCYTE ESTERASE UR-ACNC: NEGATIVE — SIGNIFICANT CHANGE UP
LYMPHOCYTES # BLD AUTO: 1.73 K/UL — SIGNIFICANT CHANGE UP (ref 1–3.3)
LYMPHOCYTES # BLD AUTO: 25.2 % — SIGNIFICANT CHANGE UP (ref 13–44)
MCHC RBC-ENTMCNC: 32.1 PG — SIGNIFICANT CHANGE UP (ref 27–34)
MCHC RBC-ENTMCNC: 32.3 GM/DL — SIGNIFICANT CHANGE UP (ref 32–36)
MCV RBC AUTO: 99.3 FL — SIGNIFICANT CHANGE UP (ref 80–100)
MONOCYTES # BLD AUTO: 0.64 K/UL — SIGNIFICANT CHANGE UP (ref 0–0.9)
MONOCYTES NFR BLD AUTO: 9.3 % — SIGNIFICANT CHANGE UP (ref 2–14)
NEUTROPHILS # BLD AUTO: 4.39 K/UL — SIGNIFICANT CHANGE UP (ref 1.8–7.4)
NEUTROPHILS NFR BLD AUTO: 64.1 % — SIGNIFICANT CHANGE UP (ref 43–77)
NITRITE UR-MCNC: NEGATIVE — SIGNIFICANT CHANGE UP
PH UR: 7 — SIGNIFICANT CHANGE UP (ref 5–8)
PLATELET # BLD AUTO: 232 K/UL — SIGNIFICANT CHANGE UP (ref 150–400)
POTASSIUM SERPL-MCNC: 4.4 MMOL/L — SIGNIFICANT CHANGE UP (ref 3.5–5.3)
POTASSIUM SERPL-SCNC: 4.4 MMOL/L — SIGNIFICANT CHANGE UP (ref 3.5–5.3)
PROT UR-MCNC: NEGATIVE MG/DL — SIGNIFICANT CHANGE UP
RBC # BLD: 4.18 M/UL — SIGNIFICANT CHANGE UP (ref 3.8–5.2)
RBC # FLD: 14.2 % — SIGNIFICANT CHANGE UP (ref 10.3–14.5)
SODIUM SERPL-SCNC: 137 MMOL/L — SIGNIFICANT CHANGE UP (ref 135–145)
SP GR SPEC: 1 — LOW (ref 1.01–1.02)
UROBILINOGEN FLD QL: NEGATIVE MG/DL — SIGNIFICANT CHANGE UP
WBC # BLD: 6.86 K/UL — SIGNIFICANT CHANGE UP (ref 3.8–10.5)
WBC # FLD AUTO: 6.86 K/UL — SIGNIFICANT CHANGE UP (ref 3.8–10.5)

## 2021-03-18 PROCEDURE — G0463: CPT | Mod: 25

## 2021-03-18 PROCEDURE — 81001 URINALYSIS AUTO W/SCOPE: CPT

## 2021-03-18 PROCEDURE — 85025 COMPLETE CBC W/AUTO DIFF WBC: CPT

## 2021-03-18 PROCEDURE — 86901 BLOOD TYPING SEROLOGIC RH(D): CPT

## 2021-03-18 PROCEDURE — 80048 BASIC METABOLIC PNL TOTAL CA: CPT

## 2021-03-18 PROCEDURE — 93005 ELECTROCARDIOGRAM TRACING: CPT

## 2021-03-18 PROCEDURE — 36415 COLL VENOUS BLD VENIPUNCTURE: CPT

## 2021-03-18 PROCEDURE — 86900 BLOOD TYPING SEROLOGIC ABO: CPT

## 2021-03-18 PROCEDURE — 93010 ELECTROCARDIOGRAM REPORT: CPT

## 2021-03-18 PROCEDURE — 86850 RBC ANTIBODY SCREEN: CPT

## 2021-03-18 NOTE — H&P PST ADULT - HEALTH CARE MAINTENANCE
flu vaccine and covid current   [place] Pt denies  travel internationally for the past 21 days

## 2021-03-18 NOTE — H&P PST ADULT - NSICDXPASTMEDICALHX_GEN_ALL_CORE_FT
PAST MEDICAL HISTORY:  Degeneration macular     Emphysema of lung controlled    Environmental allergies     Migraine headache     RA (rheumatoid arthritis)

## 2021-03-18 NOTE — H&P PST ADULT - NSANTHOSAYNRD_GEN_A_CORE
No. MADISON screening performed.  STOP BANG Legend: 0-2 = LOW Risk; 3-4 = INTERMEDIATE Risk; 5-8 = HIGH Risk

## 2021-03-18 NOTE — H&P PST ADULT - HISTORY OF PRESENT ILLNESS
80 year old female presents with vaginal atrophy and uterine prolapse; use of pessary unsuccessful she presents  to PST for planned anterior posterior repair

## 2021-03-18 NOTE — H&P PST ADULT - ASSESSMENT
80 year old female presents to PST for planned anterior posterior repair    Plan:  1. PST instructions given ; NPO status instructions to be given by ASU   2. Pt instructed to take following meds with sip of water :   3. Pt instructed to take routine evening medications unless indicated   4. Stop NSAIDS ( Aspirin Alev Motrin Mobic Diclofenac), herbal supplements , MVI , Vitamin fish oil 7 days prior to surgery  unless   directed by surgeon or cardiologist;   5. Medical Optimization  with Dr Jonathan Boxer  6. EZ wash instructions given & mupirocin instructions given  7. Labs EKG CXR as per surgeon request   8. Pt instructed to self quarantine after Covid test   9. Covid Testing scheduled Pt notified and aware  10. Pt denies covid symptoms shortness of breath fever cough    80 year old female presents to PST for planned anterior posterior repair    Plan:  1. PST instructions given ; NPO status instructions to be given by ASU   2. Pt instructed to take following meds with sip of water : none  3. Pt instructed to take routine evening medications unless indicated   4. Stop NSAIDS ( Aspirin Alev Motrin Mobic Diclofenac), herbal supplements , MVI , Vitamin fish oil 7 days prior to surgery  unless   directed by surgeon or cardiologist;   5. Medical Optimization  with Dr Jonathan Boxer  6.  Pt denies covid symptoms shortness of breath fever cough   7. Labs EKG  as per surgeon request   8. Pt instructed to self quarantine after Covid test   9. Covid Testing scheduled Pt notified and aware

## 2021-03-19 DIAGNOSIS — N95.2 POSTMENOPAUSAL ATROPHIC VAGINITIS: ICD-10-CM

## 2021-03-19 DIAGNOSIS — Z01.818 ENCOUNTER FOR OTHER PREPROCEDURAL EXAMINATION: ICD-10-CM

## 2021-03-21 ENCOUNTER — APPOINTMENT (OUTPATIENT)
Dept: DISASTER EMERGENCY | Facility: CLINIC | Age: 81
End: 2021-03-21

## 2021-03-22 LAB — SARS-COV-2 N GENE NPH QL NAA+PROBE: NOT DETECTED

## 2021-03-24 ENCOUNTER — APPOINTMENT (OUTPATIENT)
Dept: OBGYN | Facility: HOSPITAL | Age: 81
End: 2021-03-24

## 2021-03-24 ENCOUNTER — OUTPATIENT (OUTPATIENT)
Dept: INPATIENT UNIT | Facility: HOSPITAL | Age: 81
LOS: 1 days | Discharge: HOME | End: 2021-03-24
Payer: MEDICARE

## 2021-03-24 ENCOUNTER — RESULT REVIEW (OUTPATIENT)
Age: 81
End: 2021-03-24

## 2021-03-24 VITALS
HEIGHT: 63 IN | HEART RATE: 79 BPM | WEIGHT: 113.1 LBS | RESPIRATION RATE: 16 BRPM | TEMPERATURE: 98 F | DIASTOLIC BLOOD PRESSURE: 73 MMHG | SYSTOLIC BLOOD PRESSURE: 136 MMHG | OXYGEN SATURATION: 100 %

## 2021-03-24 VITALS
HEART RATE: 83 BPM | SYSTOLIC BLOOD PRESSURE: 110 MMHG | RESPIRATION RATE: 17 BRPM | DIASTOLIC BLOOD PRESSURE: 59 MMHG | TEMPERATURE: 97 F | OXYGEN SATURATION: 99 %

## 2021-03-24 DIAGNOSIS — Z98.49 CATARACT EXTRACTION STATUS, UNSPECIFIED EYE: Chronic | ICD-10-CM

## 2021-03-24 DIAGNOSIS — Z98.890 OTHER SPECIFIED POSTPROCEDURAL STATES: Chronic | ICD-10-CM

## 2021-03-24 DIAGNOSIS — N95.2 POSTMENOPAUSAL ATROPHIC VAGINITIS: ICD-10-CM

## 2021-03-24 PROCEDURE — 88302 TISSUE EXAM BY PATHOLOGIST: CPT

## 2021-03-24 PROCEDURE — 88302 TISSUE EXAM BY PATHOLOGIST: CPT | Mod: 26

## 2021-03-24 PROCEDURE — 57240 ANTERIOR COLPORRHAPHY: CPT

## 2021-03-24 RX ORDER — ONDANSETRON 8 MG/1
4 TABLET, FILM COATED ORAL ONCE
Refills: 0 | Status: DISCONTINUED | OUTPATIENT
Start: 2021-03-24 | End: 2021-03-24

## 2021-03-24 RX ORDER — MEPERIDINE HYDROCHLORIDE 50 MG/ML
12.5 INJECTION INTRAMUSCULAR; INTRAVENOUS; SUBCUTANEOUS
Refills: 0 | Status: DISCONTINUED | OUTPATIENT
Start: 2021-03-24 | End: 2021-03-24

## 2021-03-24 RX ORDER — SODIUM CHLORIDE 9 MG/ML
1000 INJECTION, SOLUTION INTRAVENOUS
Refills: 0 | Status: DISCONTINUED | OUTPATIENT
Start: 2021-03-24 | End: 2021-03-24

## 2021-03-24 RX ORDER — OXYCODONE HYDROCHLORIDE 5 MG/1
5 TABLET ORAL ONCE
Refills: 0 | Status: DISCONTINUED | OUTPATIENT
Start: 2021-03-24 | End: 2021-03-24

## 2021-03-24 RX ORDER — SIMETHICONE 80 MG/1
80 TABLET, CHEWABLE ORAL EVERY 6 HOURS
Refills: 0 | Status: DISCONTINUED | OUTPATIENT
Start: 2021-03-24 | End: 2021-03-24

## 2021-03-24 RX ORDER — IBUPROFEN 200 MG
600 TABLET ORAL EVERY 6 HOURS
Refills: 0 | Status: DISCONTINUED | OUTPATIENT
Start: 2021-03-24 | End: 2021-03-24

## 2021-03-24 RX ORDER — ONDANSETRON 8 MG/1
8 TABLET, FILM COATED ORAL EVERY 8 HOURS
Refills: 0 | Status: DISCONTINUED | OUTPATIENT
Start: 2021-03-24 | End: 2021-03-24

## 2021-03-24 RX ORDER — FENTANYL CITRATE 50 UG/ML
50 INJECTION INTRAVENOUS
Refills: 0 | Status: DISCONTINUED | OUTPATIENT
Start: 2021-03-24 | End: 2021-03-24

## 2021-03-24 RX ORDER — ACETAMINOPHEN 500 MG
1000 TABLET ORAL EVERY 6 HOURS
Refills: 0 | Status: DISCONTINUED | OUTPATIENT
Start: 2021-03-24 | End: 2021-03-24

## 2021-03-24 NOTE — ASU DISCHARGE PLAN (ADULT/PEDIATRIC) - ACTIVITY LEVEL
6 weeks/No heavy lifting/Nothing per rectum/Nothing per vagina/No tub baths/No douching/No tampons/No intercourse

## 2021-03-24 NOTE — ASU DISCHARGE PLAN (ADULT/PEDIATRIC) - CARE PROVIDER_API CALL
Apolinar Cool)  Obstetrics and Gynecology  [address] [place] [id number]  Phone: [phone]  Fax: [phone]  Follow Up Time:

## 2021-03-24 NOTE — BRIEF OPERATIVE NOTE - NSICDXBRIEFPROCEDURE_GEN_ALL_CORE_FT
PROCEDURES:  Cystoscopy 24-Mar-2021 15:11:17  Carol Howell  Anterior colporrhaphy 24-Mar-2021 15:04:22  Carol Howell

## 2021-03-24 NOTE — ASU PATIENT PROFILE, ADULT - TEACHING/LEARNING LEARNING PREFERENCES
Last appt 12/30/19  Last refilled info;  clopidogrel (PLAVIX) 75 MG tablet 90 tablet 3 12/30/2019     Sig - Route: Take 1 tablet by mouth daily. - Oral    Sent to pharmacy as: Clopidogrel Bisulfate 75 MG Oral Tablet    Class: Eprescribe    E-Prescribing Status: Receipt confirmed by pharmacy (12/30/2019  1:49 PM        Disp Refills Start End    atorvastatin (LIPITOR) 10 MG tablet 90 tablet 3 12/30/2019     Sig - Route: Take 1 tablet by mouth daily. - Oral    Sent to pharmacy as: Atorvastatin Calcium 10 MG Oral Tablet    Class: Eprescribe    E-Prescribing Status: Receipt confirmed by pharmacy (12/30/2019  1:49 PM         Next appt NONE  Refill unable to be completed per standing protocol due to; no future appointment.  Orders pended, and routed to provider for approval.      verbal instruction/written material

## 2021-03-24 NOTE — ASU DISCHARGE PLAN (ADULT/PEDIATRIC) - ASU DC SPECIAL INSTRUCTIONSFT
Discharge Instructions:  1. Diet: advance as tolerated  2. Activity limited by:   - no running, heavy lifting, strenuous exercise,   - nothing in vagina (bath, swim, intercourse, douching, tampons) for 2 weeks   3. Medications:   - Senna to prevent constipation (please hold for loose stools)  - Ibuprofen 600mg every 6 hours as needed for pain  - Acetaminophen 500mg every 4 hours as needed for pain  4. Follow-up appointment - 2 weeks. Please call the office upon discharge to schedule your appointment if you do not have one already.   5. Precautions:  - Call the office or go to the ED if you have any of the followin) Fever >100.4 that does not resolve  2) Intractable pain  3) Heavy bleeding  6. Hygiene  - Use peribottle on perineum at the same time as you urinate to dilute acidity of urine (prevents discomfort)  - You may apply ice packs to the area (not directly) as needed for pain relief and swelling  - You may also use witch hazel pads as needed for pain/discomfort  - Some bleeding and drainage is expected and you may need a pantyliner for 7-10days  -

## 2021-03-24 NOTE — ASU PATIENT PROFILE, ADULT - PMH
Degeneration macular    Emphysema of lung  controlled  Environmental allergies    Migraine headache    RA (rheumatoid arthritis)

## 2021-03-25 ENCOUNTER — TRANSCRIPTION ENCOUNTER (OUTPATIENT)
Age: 81
End: 2021-03-25

## 2021-04-01 DIAGNOSIS — Z88.0 ALLERGY STATUS TO PENICILLIN: ICD-10-CM

## 2021-04-01 DIAGNOSIS — J43.9 EMPHYSEMA, UNSPECIFIED: ICD-10-CM

## 2021-04-01 DIAGNOSIS — N81.10 CYSTOCELE, UNSPECIFIED: ICD-10-CM

## 2021-04-01 DIAGNOSIS — Z98.49 CATARACT EXTRACTION STATUS, UNSPECIFIED EYE: ICD-10-CM

## 2021-04-01 DIAGNOSIS — Z87.891 PERSONAL HISTORY OF NICOTINE DEPENDENCE: ICD-10-CM

## 2021-04-01 DIAGNOSIS — M06.9 RHEUMATOID ARTHRITIS, UNSPECIFIED: ICD-10-CM

## 2021-04-05 ENCOUNTER — APPOINTMENT (OUTPATIENT)
Dept: OBGYN | Facility: CLINIC | Age: 81
End: 2021-04-05
Payer: MEDICARE

## 2021-04-05 PROBLEM — H35.30 UNSPECIFIED MACULAR DEGENERATION: Chronic | Status: ACTIVE | Noted: 2021-03-18

## 2021-04-05 PROBLEM — G43.909 MIGRAINE, UNSPECIFIED, NOT INTRACTABLE, WITHOUT STATUS MIGRAINOSUS: Chronic | Status: ACTIVE | Noted: 2021-03-18

## 2021-04-05 PROBLEM — Z91.09 OTHER ALLERGY STATUS, OTHER THAN TO DRUGS AND BIOLOGICAL SUBSTANCES: Chronic | Status: ACTIVE | Noted: 2021-03-18

## 2021-04-05 PROBLEM — J43.9 EMPHYSEMA, UNSPECIFIED: Chronic | Status: ACTIVE | Noted: 2020-08-12

## 2021-04-05 PROCEDURE — 99024 POSTOP FOLLOW-UP VISIT: CPT

## 2021-04-05 NOTE — PLAN
[FreeTextEntry1] : PT DOING WELL AT POST OP VISIT. DISCUSSED SURGERY. RESTRICTIONS AND LIMITATIONS DISCUSSED .

## 2021-04-05 NOTE — PHYSICAL EXAM
[Labia Majora] : normal [Labia Minora] : normal [Normal] : normal [Uterine Adnexae] : normal [FreeTextEntry4] : ANTERIOR REPAIR INTACT

## 2021-04-09 ENCOUNTER — FORM ENCOUNTER (OUTPATIENT)
Age: 81
End: 2021-04-09

## 2021-04-10 ENCOUNTER — TRANSCRIPTION ENCOUNTER (OUTPATIENT)
Age: 81
End: 2021-04-10

## 2021-04-12 ENCOUNTER — APPOINTMENT (OUTPATIENT)
Dept: DISASTER EMERGENCY | Facility: HOSPITAL | Age: 81
End: 2021-04-12

## 2021-04-13 ENCOUNTER — TRANSCRIPTION ENCOUNTER (OUTPATIENT)
Age: 81
End: 2021-04-13

## 2021-04-13 ENCOUNTER — APPOINTMENT (OUTPATIENT)
Dept: OBGYN | Facility: CLINIC | Age: 81
End: 2021-04-13

## 2021-05-03 ENCOUNTER — APPOINTMENT (OUTPATIENT)
Dept: OBGYN | Facility: CLINIC | Age: 81
End: 2021-05-03
Payer: MEDICARE

## 2021-05-03 VITALS
HEIGHT: 63 IN | WEIGHT: 114 LBS | HEART RATE: 74 BPM | TEMPERATURE: 97.2 F | SYSTOLIC BLOOD PRESSURE: 115 MMHG | DIASTOLIC BLOOD PRESSURE: 60 MMHG | RESPIRATION RATE: 16 BRPM | BODY MASS INDEX: 20.2 KG/M2

## 2021-05-03 DIAGNOSIS — Z48.89 ENCOUNTER FOR OTHER SPECIFIED SURGICAL AFTERCARE: ICD-10-CM

## 2021-05-03 PROCEDURE — 99024 POSTOP FOLLOW-UP VISIT: CPT

## 2021-05-17 ENCOUNTER — APPOINTMENT (OUTPATIENT)
Dept: DERMATOLOGY | Facility: CLINIC | Age: 81
End: 2021-05-17
Payer: SELF-PAY

## 2021-05-17 PROCEDURE — 0026D: CPT

## 2021-06-07 ENCOUNTER — APPOINTMENT (OUTPATIENT)
Dept: OBGYN | Facility: CLINIC | Age: 81
End: 2021-06-07

## 2021-06-10 ENCOUNTER — TRANSCRIPTION ENCOUNTER (OUTPATIENT)
Age: 81
End: 2021-06-10

## 2021-06-14 ENCOUNTER — APPOINTMENT (OUTPATIENT)
Dept: RADIOLOGY | Facility: CLINIC | Age: 81
End: 2021-06-14
Payer: MEDICARE

## 2021-06-14 ENCOUNTER — OUTPATIENT (OUTPATIENT)
Dept: OUTPATIENT SERVICES | Facility: HOSPITAL | Age: 81
LOS: 1 days | End: 2021-06-14
Payer: MEDICARE

## 2021-06-14 DIAGNOSIS — Z98.49 CATARACT EXTRACTION STATUS, UNSPECIFIED EYE: Chronic | ICD-10-CM

## 2021-06-14 DIAGNOSIS — Z98.890 OTHER SPECIFIED POSTPROCEDURAL STATES: Chronic | ICD-10-CM

## 2021-06-14 DIAGNOSIS — Z01.419 ENCOUNTER FOR GYNECOLOGICAL EXAMINATION (GENERAL) (ROUTINE) WITHOUT ABNORMAL FINDINGS: ICD-10-CM

## 2021-06-14 PROCEDURE — 77080 DXA BONE DENSITY AXIAL: CPT | Mod: 26

## 2021-06-14 PROCEDURE — 77080 DXA BONE DENSITY AXIAL: CPT

## 2021-06-15 ENCOUNTER — NON-APPOINTMENT (OUTPATIENT)
Age: 81
End: 2021-06-15

## 2021-08-02 ENCOUNTER — APPOINTMENT (OUTPATIENT)
Dept: OBGYN | Facility: CLINIC | Age: 81
End: 2021-08-02
Payer: MEDICARE

## 2021-08-02 VITALS
DIASTOLIC BLOOD PRESSURE: 68 MMHG | SYSTOLIC BLOOD PRESSURE: 110 MMHG | HEIGHT: 63 IN | WEIGHT: 114 LBS | TEMPERATURE: 97 F | BODY MASS INDEX: 20.2 KG/M2

## 2021-08-02 DIAGNOSIS — N81.2 INCOMPLETE UTEROVAGINAL PROLAPSE: ICD-10-CM

## 2021-08-02 DIAGNOSIS — M85.80 OTHER SPECIFIED DISORDERS OF BONE DENSITY AND STRUCTURE, UNSPECIFIED SITE: ICD-10-CM

## 2021-08-02 DIAGNOSIS — N95.2 POSTMENOPAUSAL ATROPHIC VAGINITIS: ICD-10-CM

## 2021-08-02 PROCEDURE — 99213 OFFICE O/P EST LOW 20 MIN: CPT

## 2021-08-02 NOTE — PHYSICAL EXAM
[Labia Majora] : normal [Labia Minora] : normal [Normal] : normal [Uterine Adnexae] : normal [FreeTextEntry4] : WELL HEALED

## 2021-08-02 NOTE — PLAN
[FreeTextEntry1] : PT PRESENTS FOR FOLLOWUP AFTER SURGERY. PT DENIES PAIN OR BLEEDING. PT HAS HAD A FULL RECOVERY. NO RESTRICTIONS OR LIMITATIONS PLACED. PT TO FOLLOWUP FOR ANNUAL GYN EXAM.

## 2021-08-09 ENCOUNTER — APPOINTMENT (OUTPATIENT)
Dept: OBGYN | Facility: CLINIC | Age: 81
End: 2021-08-09

## 2021-09-24 ENCOUNTER — RX RENEWAL (OUTPATIENT)
Age: 81
End: 2021-09-24

## 2022-01-18 ENCOUNTER — APPOINTMENT (OUTPATIENT)
Dept: GASTROENTEROLOGY | Facility: CLINIC | Age: 82
End: 2022-01-18
Payer: MEDICARE

## 2022-01-18 VITALS
BODY MASS INDEX: 20.2 KG/M2 | DIASTOLIC BLOOD PRESSURE: 68 MMHG | HEIGHT: 63 IN | SYSTOLIC BLOOD PRESSURE: 102 MMHG | HEART RATE: 50 BPM | WEIGHT: 114 LBS

## 2022-01-18 DIAGNOSIS — R63.4 ABNORMAL WEIGHT LOSS: ICD-10-CM

## 2022-01-18 DIAGNOSIS — Z80.0 ENCOUNTER FOR SCREENING FOR MALIGNANT NEOPLASM OF COLON: ICD-10-CM

## 2022-01-18 DIAGNOSIS — R68.81 EARLY SATIETY: ICD-10-CM

## 2022-01-18 DIAGNOSIS — Z12.11 ENCOUNTER FOR SCREENING FOR MALIGNANT NEOPLASM OF COLON: ICD-10-CM

## 2022-01-18 PROCEDURE — 99213 OFFICE O/P EST LOW 20 MIN: CPT

## 2022-01-18 NOTE — ASSESSMENT
[FreeTextEntry1] : 80 yo female with family history of colon cancer and history of weight loss/early satiety. Will arrange colonoscopy and upper endoscopy.

## 2022-01-18 NOTE — HISTORY OF PRESENT ILLNESS
[de-identified] : Ms. MAURA BARKER is a 81 year old female with history of family history of colon cancer. Patient had last colonoscopy in 2015. There were no complaints of bowel issues. Patient has no unintentional weight loss of eighteen pounds. Patient does note some early satiety. Patient has a history of COPD and a recent CT scan of the chest was reportedly unremarkable. There is no complaints of dysphagia.\par

## 2022-01-27 ENCOUNTER — APPOINTMENT (OUTPATIENT)
Dept: GASTROENTEROLOGY | Facility: AMBULATORY MEDICAL SERVICES | Age: 82
End: 2022-01-27
Payer: MEDICARE

## 2022-01-27 ENCOUNTER — RESULT REVIEW (OUTPATIENT)
Age: 82
End: 2022-01-27

## 2022-01-27 PROCEDURE — 43239 EGD BIOPSY SINGLE/MULTIPLE: CPT | Mod: 59

## 2022-01-27 PROCEDURE — 45378 DIAGNOSTIC COLONOSCOPY: CPT

## 2022-02-02 DIAGNOSIS — B37.9 CANDIDIASIS, UNSPECIFIED: ICD-10-CM

## 2022-02-03 ENCOUNTER — APPOINTMENT (OUTPATIENT)
Dept: MAMMOGRAPHY | Facility: CLINIC | Age: 82
End: 2022-02-03
Payer: MEDICARE

## 2022-02-03 ENCOUNTER — OUTPATIENT (OUTPATIENT)
Dept: OUTPATIENT SERVICES | Facility: HOSPITAL | Age: 82
LOS: 1 days | End: 2022-02-03
Payer: MEDICARE

## 2022-02-03 ENCOUNTER — RESULT REVIEW (OUTPATIENT)
Age: 82
End: 2022-02-03

## 2022-02-03 DIAGNOSIS — N18.2 CHRONIC KIDNEY DISEASE, STAGE 2 (MILD): ICD-10-CM

## 2022-02-03 DIAGNOSIS — Z98.890 OTHER SPECIFIED POSTPROCEDURAL STATES: Chronic | ICD-10-CM

## 2022-02-03 DIAGNOSIS — Z98.49 CATARACT EXTRACTION STATUS, UNSPECIFIED EYE: Chronic | ICD-10-CM

## 2022-02-03 PROCEDURE — 77067 SCR MAMMO BI INCL CAD: CPT

## 2022-02-03 PROCEDURE — 77063 BREAST TOMOSYNTHESIS BI: CPT | Mod: 26

## 2022-02-03 PROCEDURE — 77063 BREAST TOMOSYNTHESIS BI: CPT

## 2022-02-03 PROCEDURE — 77067 SCR MAMMO BI INCL CAD: CPT | Mod: 26

## 2022-02-07 ENCOUNTER — APPOINTMENT (OUTPATIENT)
Dept: OBGYN | Facility: CLINIC | Age: 82
End: 2022-02-07
Payer: MEDICARE

## 2022-02-07 VITALS
WEIGHT: 99 LBS | RESPIRATION RATE: 18 BRPM | SYSTOLIC BLOOD PRESSURE: 140 MMHG | OXYGEN SATURATION: 98 % | BODY MASS INDEX: 17.54 KG/M2 | HEIGHT: 63 IN | DIASTOLIC BLOOD PRESSURE: 82 MMHG

## 2022-02-07 DIAGNOSIS — Z00.00 ENCOUNTER FOR GENERAL ADULT MEDICAL EXAMINATION W/OUT ABNORMAL FINDINGS: ICD-10-CM

## 2022-02-07 PROCEDURE — G0101: CPT

## 2022-02-07 NOTE — PLAN
[FreeTextEntry1] :  81 YO FEMALE PRESENTS FOR ANNUAL GYN EXAMINATION. SELF BREAST EXAMINATION TAUGHT. MAMMOGRAM ORDERED. EXERCISE, CALCIUM AND VITAMIN D3 SUPPLEMENTATION DISCUSSED. BONE DENSITY STUDY AND INDICATIONS REVIEWED. COLONOSCOPY HISTORY REVIEWED AND DISCUSSED FOLLOWUP.\par DISCUSSED WT LOSS. WILL SEE DR. JONES

## 2022-02-07 NOTE — PHYSICAL EXAM
[Appropriately responsive] : appropriately responsive [Alert] : alert [No Acute Distress] : no acute distress [No Lymphadenopathy] : no lymphadenopathy [Regular Rate Rhythm] : regular rate rhythm [No Murmurs] : no murmurs [Clear to Auscultation B/L] : clear to auscultation bilaterally [Soft] : soft [Non-tender] : non-tender [Non-distended] : non-distended [No HSM] : No HSM [No Lesions] : no lesions [No Mass] : no mass [Oriented x3] : oriented x3 [Examination Of The Breasts] : a normal appearance [No Masses] : no breast masses were palpable [Labia Majora] : normal [Labia Minora] : normal [Normal] : normal [Uterine Adnexae] : normal [Declined] : Patient declined rectal exam [FreeTextEntry4] : ANTERIOR REPAIR WELL HEALED [FreeTextEntry9] : JUST HAD WITH GI

## 2022-03-08 ENCOUNTER — APPOINTMENT (OUTPATIENT)
Dept: DERMATOLOGY | Facility: CLINIC | Age: 82
End: 2022-03-08
Payer: MEDICARE

## 2022-03-08 DIAGNOSIS — L65.9 NONSCARRING HAIR LOSS, UNSPECIFIED: ICD-10-CM

## 2022-03-08 DIAGNOSIS — L72.0 EPIDERMAL CYST: ICD-10-CM

## 2022-03-08 PROCEDURE — 99214 OFFICE O/P EST MOD 30 MIN: CPT

## 2022-03-08 RX ORDER — AZITHROMYCIN 250 MG/1
250 TABLET, FILM COATED ORAL
Qty: 6 | Refills: 0 | Status: DISCONTINUED | COMMUNITY
Start: 2022-01-12 | End: 2022-03-08

## 2022-03-08 RX ORDER — LEFLUNOMIDE 20 MG/1
20 TABLET, FILM COATED ORAL
Qty: 30 | Refills: 0 | Status: DISCONTINUED | COMMUNITY
Start: 2021-11-29 | End: 2022-03-08

## 2022-03-08 RX ORDER — METHOTREXATE 25 MG/ML
50 INJECTION INTRA-ARTERIAL; INTRAMUSCULAR; INTRATHECAL; INTRAVENOUS
Qty: 24 | Refills: 0 | Status: DISCONTINUED | COMMUNITY
Start: 2020-10-14 | End: 2022-03-08

## 2022-03-08 NOTE — PHYSICAL EXAM
[Alert] : alert [Oriented x 3] : ~L oriented x 3 [Well Nourished] : well nourished [Full Body Skin Exam Performed] : performed [FreeTextEntry3] : A full skin exam was performed including the scalp, face, neck, chest, abdomen, back, buttocks, upper extremities and lower extremities.  The patient declined examination of the breasts and genitalia.  \par The exam did show the following benign growths:\par [place] pigmented nevi.\par Seborrheic keratoses.\par Lentigines.\par Cystic nodule with central comedone, left pubic region.\par \par Scalp with decreased hair density.\par Thyromegaly.\par \par

## 2022-03-08 NOTE — ASSESSMENT
[FreeTextEntry1] : A complete skin examination was performed.  There is no evidence of skin cancer.  We discussed the importance of photoprotection, including the use of hats, protective clothing and sunscreens with an SPF of at least 30.  Sun avoidance was also discussed.  The ABCDE's of melanoma was discussed.  Regular skin exams recommended.\par \par EIC of the pubic region.\par Will remove if bothersome.\par \par Alopecia\par Discussed with patient.\par ? weight loss contributing.\par She has had labs drawn recently and will send results to me for further evaluation, and consideration of further labs.  I will call patient when labs are available.

## 2022-03-08 NOTE — HISTORY OF PRESENT ILLNESS
[FreeTextEntry1] : Patient presents for skin examination. [de-identified] : Notes some hair loss of the scalp, over the years.  She notes she lost significant hair while she was on MTX.  Off the MTX for the past 8 months.  Various other rheumatoid medications, not on Orencia.\par 20 lb weight loss over the past year, unintentional.  w/u by primary MD and GI without significant findings.

## 2022-09-23 ENCOUNTER — RX RENEWAL (OUTPATIENT)
Age: 82
End: 2022-09-23

## 2022-10-21 ENCOUNTER — APPOINTMENT (OUTPATIENT)
Dept: DERMATOLOGY | Facility: CLINIC | Age: 82
End: 2022-10-21

## 2022-10-21 VITALS — HEIGHT: 63 IN | BODY MASS INDEX: 18.61 KG/M2 | WEIGHT: 105 LBS

## 2022-10-21 PROCEDURE — 99214 OFFICE O/P EST MOD 30 MIN: CPT

## 2022-10-21 RX ORDER — FOLIC ACID 1 MG/1
1 TABLET ORAL
Qty: 90 | Refills: 0 | Status: DISCONTINUED | COMMUNITY
Start: 2020-07-27 | End: 2022-10-21

## 2022-10-21 RX ORDER — SULFAMETHOXAZOLE AND TRIMETHOPRIM 800; 160 MG/1; MG/1
800-160 TABLET ORAL
Qty: 14 | Refills: 0 | Status: COMPLETED | COMMUNITY
Start: 2022-06-24

## 2022-10-21 RX ORDER — SODIUM PICOSULFATE, MAGNESIUM OXIDE, AND ANHYDROUS CITRIC ACID 10; 3.5; 12 MG/160ML; G/160ML; G/160ML
10-3.5-12 MG-GM LIQUID ORAL
Qty: 1 | Refills: 0 | Status: COMPLETED | COMMUNITY
Start: 2022-01-18 | End: 2022-10-21

## 2022-10-21 RX ORDER — ABATACEPT 250 MG/15ML
250 INJECTION, POWDER, LYOPHILIZED, FOR SOLUTION INTRAVENOUS
Refills: 0 | Status: DISCONTINUED | COMMUNITY
End: 2022-10-21

## 2022-10-21 RX ORDER — METHYLPREDNISOLONE 4 MG/1
4 TABLET ORAL
Qty: 21 | Refills: 0 | Status: COMPLETED | COMMUNITY
Start: 2022-07-11

## 2022-10-21 RX ORDER — SODIUM SULFATE, POTASSIUM SULFATE, MAGNESIUM SULFATE 17.5; 3.13; 1.6 G/ML; G/ML; G/ML
17.5-3.13-1.6 SOLUTION, CONCENTRATE ORAL
Qty: 1 | Refills: 0 | Status: COMPLETED | COMMUNITY
Start: 2021-01-07 | End: 2022-10-21

## 2022-10-21 RX ORDER — CONJUGATED ESTROGENS 0.62 MG/G
0.62 CREAM VAGINAL
Qty: 1 | Refills: 0 | Status: DISCONTINUED | COMMUNITY
Start: 2018-04-13 | End: 2022-10-21

## 2022-10-21 RX ORDER — IPRATROPIUM BROMIDE 42 UG/1
0.06 SPRAY NASAL
Qty: 105 | Refills: 0 | Status: ACTIVE | COMMUNITY
Start: 2022-06-22

## 2022-10-21 RX ORDER — FLUNISOLIDE 0.25 MG/ML
25 MCG/ACT SOLUTION NASAL
Qty: 100 | Refills: 0 | Status: ACTIVE | COMMUNITY
Start: 2022-06-16

## 2022-10-21 NOTE — PHYSICAL EXAM
[FreeTextEntry3] : hands;  Erythematous scaling patches with inflammation \par mild fissuring on fingers;

## 2022-10-21 NOTE — HISTORY OF PRESENT ILLNESS
[de-identified] : The patient has been fit in for urgent appointment.\par c/o cracking, scaling on hands;  used OTC HC;

## 2023-02-13 ENCOUNTER — APPOINTMENT (OUTPATIENT)
Dept: OBGYN | Facility: CLINIC | Age: 83
End: 2023-02-13
Payer: MEDICARE

## 2023-02-13 VITALS — WEIGHT: 108 LBS | BODY MASS INDEX: 19.13 KG/M2 | DIASTOLIC BLOOD PRESSURE: 70 MMHG | SYSTOLIC BLOOD PRESSURE: 120 MMHG

## 2023-02-13 PROCEDURE — 82270 OCCULT BLOOD FECES: CPT

## 2023-02-13 PROCEDURE — G0101: CPT

## 2023-02-13 NOTE — COUNSELING
[Nutrition/ Exercise/ Weight Management] : nutrition, exercise, weight management [Breast Self Exam] : breast self exam [Bladder Hygiene] : bladder hygiene [Vaccines] : vaccines [Medication Management] : medication management

## 2023-02-13 NOTE — PLAN
[FreeTextEntry1] : 81 YO FEMALE PRESENTS FOR ANNUAL GYN EXAMINATION. SELF BREAST EXAMINATION TAUGHT. MAMMOGRAM ORDERED. EXERCISE, CALCIUM AND VITAMIN D3 SUPPLEMENTATION DISCUSSED. BONE DENSITY STUDY AND INDICATIONS REVIEWED. COLONOSCOPY HISTORY REVIEWED AND DISCUSSED FOLLOWUP

## 2023-02-13 NOTE — PHYSICAL EXAM
[Appropriately responsive] : appropriately responsive [Alert] : alert [No Acute Distress] : no acute distress [No Lymphadenopathy] : no lymphadenopathy [No Murmurs] : no murmurs [Soft] : soft [Non-tender] : non-tender [Non-distended] : non-distended [No HSM] : No HSM [No Lesions] : no lesions [No Mass] : no mass [Oriented x3] : oriented x3 [Examination Of The Breasts] : a normal appearance [No Masses] : no breast masses were palpable [Labia Majora] : normal [Labia Minora] : normal [Uterine Adnexae] : normal [Declined] : Patient declined rectal exam [Normal rectal exam] : was normal [Normal Brown Stool] : was normal and brown [Occult Blood Positive] : was negative for occult blood analysis [Internal Hemorrhoid] : no internal hemorrhoids were present [External Hemorrhoid] : no external hemorrhoids were present [Skin Tags] : no residual hemorrhoidal skin tags [Normal] : was normal [None] : there was no rectal mass  [FreeTextEntry4] : NO CYSTOCELE

## 2023-03-02 ENCOUNTER — INPATIENT (INPATIENT)
Facility: HOSPITAL | Age: 83
LOS: 3 days | Discharge: HOME | DRG: 199 | End: 2023-03-06
Attending: HOSPITALIST | Admitting: THORACIC SURGERY (CARDIOTHORACIC VASCULAR SURGERY)
Payer: MEDICARE

## 2023-03-02 VITALS — HEART RATE: 117 BPM | OXYGEN SATURATION: 94 % | WEIGHT: 108.03 LBS | HEIGHT: 64 IN

## 2023-03-02 DIAGNOSIS — Z98.890 OTHER SPECIFIED POSTPROCEDURAL STATES: Chronic | ICD-10-CM

## 2023-03-02 DIAGNOSIS — J93.9 PNEUMOTHORAX, UNSPECIFIED: ICD-10-CM

## 2023-03-02 DIAGNOSIS — Z98.49 CATARACT EXTRACTION STATUS, UNSPECIFIED EYE: Chronic | ICD-10-CM

## 2023-03-02 LAB
ALBUMIN SERPL ELPH-MCNC: 3.8 G/DL — SIGNIFICANT CHANGE UP (ref 3.3–5)
ALP SERPL-CCNC: 73 U/L — SIGNIFICANT CHANGE UP (ref 40–120)
ALT FLD-CCNC: 41 U/L — SIGNIFICANT CHANGE UP (ref 12–78)
ANION GAP SERPL CALC-SCNC: 3 MMOL/L — LOW (ref 5–17)
APTT BLD: 27 SEC — LOW (ref 27.5–35.5)
AST SERPL-CCNC: 44 U/L — HIGH (ref 15–37)
BASOPHILS # BLD AUTO: 0.07 K/UL — SIGNIFICANT CHANGE UP (ref 0–0.2)
BASOPHILS NFR BLD AUTO: 0.7 % — SIGNIFICANT CHANGE UP (ref 0–2)
BILIRUB SERPL-MCNC: 0.5 MG/DL — SIGNIFICANT CHANGE UP (ref 0.2–1.2)
BLD GP AB SCN SERPL QL: SIGNIFICANT CHANGE UP
BUN SERPL-MCNC: 11 MG/DL — SIGNIFICANT CHANGE UP (ref 7–23)
CALCIUM SERPL-MCNC: 9.4 MG/DL — SIGNIFICANT CHANGE UP (ref 8.5–10.1)
CHLORIDE SERPL-SCNC: 106 MMOL/L — SIGNIFICANT CHANGE UP (ref 96–108)
CO2 SERPL-SCNC: 28 MMOL/L — SIGNIFICANT CHANGE UP (ref 22–31)
CREAT SERPL-MCNC: 0.59 MG/DL — SIGNIFICANT CHANGE UP (ref 0.5–1.3)
EGFR: 91 ML/MIN/1.73M2 — SIGNIFICANT CHANGE UP
EOSINOPHIL # BLD AUTO: 0.05 K/UL — SIGNIFICANT CHANGE UP (ref 0–0.5)
EOSINOPHIL NFR BLD AUTO: 0.5 % — SIGNIFICANT CHANGE UP (ref 0–6)
FLUAV AG NPH QL: SIGNIFICANT CHANGE UP
FLUBV AG NPH QL: SIGNIFICANT CHANGE UP
GLUCOSE SERPL-MCNC: 106 MG/DL — HIGH (ref 70–99)
HCT VFR BLD CALC: 45.6 % — HIGH (ref 34.5–45)
HGB BLD-MCNC: 15.2 G/DL — SIGNIFICANT CHANGE UP (ref 11.5–15.5)
IMM GRANULOCYTES NFR BLD AUTO: 0.2 % — SIGNIFICANT CHANGE UP (ref 0–0.9)
INR BLD: 1 RATIO — SIGNIFICANT CHANGE UP (ref 0.88–1.16)
LACTATE SERPL-SCNC: 0.9 MMOL/L — SIGNIFICANT CHANGE UP (ref 0.7–2)
LYMPHOCYTES # BLD AUTO: 2.45 K/UL — SIGNIFICANT CHANGE UP (ref 1–3.3)
LYMPHOCYTES # BLD AUTO: 24.8 % — SIGNIFICANT CHANGE UP (ref 13–44)
MCHC RBC-ENTMCNC: 31.9 PG — SIGNIFICANT CHANGE UP (ref 27–34)
MCHC RBC-ENTMCNC: 33.3 GM/DL — SIGNIFICANT CHANGE UP (ref 32–36)
MCV RBC AUTO: 95.6 FL — SIGNIFICANT CHANGE UP (ref 80–100)
MONOCYTES # BLD AUTO: 0.71 K/UL — SIGNIFICANT CHANGE UP (ref 0–0.9)
MONOCYTES NFR BLD AUTO: 7.2 % — SIGNIFICANT CHANGE UP (ref 2–14)
NEUTROPHILS # BLD AUTO: 6.59 K/UL — SIGNIFICANT CHANGE UP (ref 1.8–7.4)
NEUTROPHILS NFR BLD AUTO: 66.6 % — SIGNIFICANT CHANGE UP (ref 43–77)
PLATELET # BLD AUTO: 255 K/UL — SIGNIFICANT CHANGE UP (ref 150–400)
POTASSIUM SERPL-MCNC: 3.6 MMOL/L — SIGNIFICANT CHANGE UP (ref 3.5–5.3)
POTASSIUM SERPL-SCNC: 3.6 MMOL/L — SIGNIFICANT CHANGE UP (ref 3.5–5.3)
PROT SERPL-MCNC: 7.9 GM/DL — SIGNIFICANT CHANGE UP (ref 6–8.3)
PROTHROM AB SERPL-ACNC: 11.6 SEC — SIGNIFICANT CHANGE UP (ref 10.5–13.4)
RBC # BLD: 4.77 M/UL — SIGNIFICANT CHANGE UP (ref 3.8–5.2)
RBC # FLD: 13.2 % — SIGNIFICANT CHANGE UP (ref 10.3–14.5)
RSV RNA NPH QL NAA+NON-PROBE: SIGNIFICANT CHANGE UP
SARS-COV-2 RNA SPEC QL NAA+PROBE: SIGNIFICANT CHANGE UP
SODIUM SERPL-SCNC: 137 MMOL/L — SIGNIFICANT CHANGE UP (ref 135–145)
TROPONIN I, HIGH SENSITIVITY RESULT: 9.73 NG/L — SIGNIFICANT CHANGE UP
WBC # BLD: 9.89 K/UL — SIGNIFICANT CHANGE UP (ref 3.8–10.5)
WBC # FLD AUTO: 9.89 K/UL — SIGNIFICANT CHANGE UP (ref 3.8–10.5)

## 2023-03-02 PROCEDURE — 85025 COMPLETE CBC W/AUTO DIFF WBC: CPT

## 2023-03-02 PROCEDURE — 80053 COMPREHEN METABOLIC PANEL: CPT

## 2023-03-02 PROCEDURE — 94640 AIRWAY INHALATION TREATMENT: CPT

## 2023-03-02 PROCEDURE — 71045 X-RAY EXAM CHEST 1 VIEW: CPT | Mod: 26,59

## 2023-03-02 PROCEDURE — 71275 CT ANGIOGRAPHY CHEST: CPT | Mod: 26,MA

## 2023-03-02 PROCEDURE — 71046 X-RAY EXAM CHEST 2 VIEWS: CPT | Mod: 26

## 2023-03-02 PROCEDURE — 99291 CRITICAL CARE FIRST HOUR: CPT | Mod: CS

## 2023-03-02 PROCEDURE — 32556 INSERT CATH PLEURA W/O IMAGE: CPT

## 2023-03-02 PROCEDURE — 93010 ELECTROCARDIOGRAM REPORT: CPT

## 2023-03-02 PROCEDURE — 71045 X-RAY EXAM CHEST 1 VIEW: CPT

## 2023-03-02 PROCEDURE — 36415 COLL VENOUS BLD VENIPUNCTURE: CPT

## 2023-03-02 PROCEDURE — 99223 1ST HOSP IP/OBS HIGH 75: CPT | Mod: AI

## 2023-03-02 RX ORDER — FENTANYL CITRATE 50 UG/ML
25 INJECTION INTRAVENOUS ONCE
Refills: 0 | Status: DISCONTINUED | OUTPATIENT
Start: 2023-03-02 | End: 2023-03-02

## 2023-03-02 RX ORDER — HYDROMORPHONE HYDROCHLORIDE 2 MG/ML
0.5 INJECTION INTRAMUSCULAR; INTRAVENOUS; SUBCUTANEOUS ONCE
Refills: 0 | Status: DISCONTINUED | OUTPATIENT
Start: 2023-03-02 | End: 2023-03-02

## 2023-03-02 RX ORDER — CEFEPIME 1 G/1
2000 INJECTION, POWDER, FOR SOLUTION INTRAMUSCULAR; INTRAVENOUS ONCE
Refills: 0 | Status: COMPLETED | OUTPATIENT
Start: 2023-03-02 | End: 2023-03-02

## 2023-03-02 RX ORDER — BUDESONIDE AND FORMOTEROL FUMARATE DIHYDRATE 160; 4.5 UG/1; UG/1
2 AEROSOL RESPIRATORY (INHALATION)
Refills: 0 | Status: DISCONTINUED | OUTPATIENT
Start: 2023-03-02 | End: 2023-03-06

## 2023-03-02 RX ORDER — FAMOTIDINE 10 MG/ML
20 INJECTION INTRAVENOUS ONCE
Refills: 0 | Status: COMPLETED | OUTPATIENT
Start: 2023-03-02 | End: 2023-03-02

## 2023-03-02 RX ORDER — CEFEPIME 1 G/1
INJECTION, POWDER, FOR SOLUTION INTRAMUSCULAR; INTRAVENOUS
Refills: 0 | Status: DISCONTINUED | OUTPATIENT
Start: 2023-03-02 | End: 2023-03-06

## 2023-03-02 RX ORDER — ALBUTEROL 90 UG/1
2 AEROSOL, METERED ORAL EVERY 6 HOURS
Refills: 0 | Status: DISCONTINUED | OUTPATIENT
Start: 2023-03-02 | End: 2023-03-06

## 2023-03-02 RX ORDER — AZITHROMYCIN 500 MG/1
500 TABLET, FILM COATED ORAL ONCE
Refills: 0 | Status: DISCONTINUED | OUTPATIENT
Start: 2023-03-02 | End: 2023-03-02

## 2023-03-02 RX ORDER — ACETAMINOPHEN 500 MG
650 TABLET ORAL EVERY 6 HOURS
Refills: 0 | Status: DISCONTINUED | OUTPATIENT
Start: 2023-03-02 | End: 2023-03-06

## 2023-03-02 RX ORDER — IPRATROPIUM BROMIDE 21 MCG
2 AEROSOL, SPRAY (ML) NASAL
Refills: 0 | Status: DISCONTINUED | OUTPATIENT
Start: 2023-03-02 | End: 2023-03-06

## 2023-03-02 RX ORDER — CEFEPIME 1 G/1
2000 INJECTION, POWDER, FOR SOLUTION INTRAMUSCULAR; INTRAVENOUS EVERY 12 HOURS
Refills: 0 | Status: DISCONTINUED | OUTPATIENT
Start: 2023-03-03 | End: 2023-03-06

## 2023-03-02 RX ORDER — SODIUM CHLORIDE 9 MG/ML
3 INJECTION INTRAMUSCULAR; INTRAVENOUS; SUBCUTANEOUS EVERY 8 HOURS
Refills: 0 | Status: DISCONTINUED | OUTPATIENT
Start: 2023-03-02 | End: 2023-03-06

## 2023-03-02 RX ORDER — CEFTRIAXONE 500 MG/1
1000 INJECTION, POWDER, FOR SOLUTION INTRAMUSCULAR; INTRAVENOUS ONCE
Refills: 0 | Status: DISCONTINUED | OUTPATIENT
Start: 2023-03-02 | End: 2023-03-02

## 2023-03-02 RX ORDER — OXYCODONE HYDROCHLORIDE 5 MG/1
10 TABLET ORAL EVERY 4 HOURS
Refills: 0 | Status: DISCONTINUED | OUTPATIENT
Start: 2023-03-02 | End: 2023-03-06

## 2023-03-02 RX ORDER — FLUTICASONE PROPIONATE 50 MCG
2 SPRAY, SUSPENSION NASAL EVERY 12 HOURS
Refills: 0 | Status: DISCONTINUED | OUTPATIENT
Start: 2023-03-02 | End: 2023-03-06

## 2023-03-02 RX ORDER — OXYCODONE HYDROCHLORIDE 5 MG/1
5 TABLET ORAL EVERY 4 HOURS
Refills: 0 | Status: DISCONTINUED | OUTPATIENT
Start: 2023-03-02 | End: 2023-03-06

## 2023-03-02 RX ADMIN — BUDESONIDE AND FORMOTEROL FUMARATE DIHYDRATE 2 PUFF(S): 160; 4.5 AEROSOL RESPIRATORY (INHALATION) at 21:39

## 2023-03-02 RX ADMIN — HYDROMORPHONE HYDROCHLORIDE 0.5 MILLIGRAM(S): 2 INJECTION INTRAMUSCULAR; INTRAVENOUS; SUBCUTANEOUS at 17:27

## 2023-03-02 RX ADMIN — Medication 110 MILLIGRAM(S): at 19:42

## 2023-03-02 RX ADMIN — FAMOTIDINE 20 MILLIGRAM(S): 10 INJECTION INTRAVENOUS at 13:14

## 2023-03-02 RX ADMIN — FENTANYL CITRATE 25 MICROGRAM(S): 50 INJECTION INTRAVENOUS at 15:30

## 2023-03-02 RX ADMIN — CEFEPIME 100 MILLIGRAM(S): 1 INJECTION, POWDER, FOR SOLUTION INTRAMUSCULAR; INTRAVENOUS at 18:06

## 2023-03-02 RX ADMIN — SODIUM CHLORIDE 3 MILLILITER(S): 9 INJECTION INTRAMUSCULAR; INTRAVENOUS; SUBCUTANEOUS at 22:13

## 2023-03-02 RX ADMIN — HYDROMORPHONE HYDROCHLORIDE 0.5 MILLIGRAM(S): 2 INJECTION INTRAMUSCULAR; INTRAVENOUS; SUBCUTANEOUS at 17:57

## 2023-03-02 RX ADMIN — Medication 30 MILLILITER(S): at 13:14

## 2023-03-02 NOTE — ED ADULT NURSE NOTE - NSIMPLEMENTINTERV_GEN_ALL_ED
Implemented All Universal Safety Interventions:  [place] to call system. Call bell, personal items and telephone within reach. Instruct patient to call for assistance. Room bathroom lighting operational. Non-slip footwear when patient is off stretcher. Physically safe environment: no spills, clutter or unnecessary equipment. Stretcher in lowest position, wheels locked, appropriate side rails in place.

## 2023-03-02 NOTE — ED ADULT NURSE REASSESSMENT NOTE - NS ED NURSE REASSESS COMMENT FT1
pt medicated for pain resting comfortable chest tube in place to low suction will cont to monitor daughter at bedside

## 2023-03-02 NOTE — H&P ADULT - NSHPLABSRESULTS_GEN_ALL_CORE
< from: CT Angio Chest PE Protocol w/ IV Cont (03.02.23 @ 14:11) >      MEDIASTINUM: Normal heart size. No pericardial effusion. Thoracic aorta   normal caliber.  No large mediastinal lymph nodes.    AIRWAYS, LUNGS, PLEURA: Central airways clear. Emphysema. Subpleural   cystic opacities at the right base. Left basilar consolidation. Clustered   and branching nodular opacities in the right lower lobe. Patchy opacities   at the right apex.    No pleural effusion. Moderate left pneumothorax.    IMAGED ABDOMEN: 1.6 cm hepatic hypodensity, likely cysts.    SOFT TISSUES: Unremarkable.    BONES: Age indeterminate L1 loss of height.      IMPRESSION:.    No pulmonary embolism.    Moderate-sized left pneumothorax.    Multifocal airspace opacities, including dominant consolidation at the   left base. Findings may represent infection and recommend CT chest   follow-up in 3 months to ensure clearing.    Findings discussed with BEAR Macedo on 3/2/2023 at 2:26 PM.    < end of copied text >

## 2023-03-02 NOTE — PATIENT PROFILE ADULT - FALL HARM RISK - HARM RISK INTERVENTIONS

## 2023-03-02 NOTE — H&P ADULT - HISTORY OF PRESENT ILLNESS
82 F with hpm copd not on home o2, former smoker, RA on actemra, migraines presented to [facility] with complaints of left sided chest pain worse when laying flat x 3 days . States the pain radiated to left arm. Was advcised by pcp to come to ED. CTA demonstrated moderate sized left pneumothorax with lll consolidation. No trauma.  Pt otherwise hemodynamically stable except for some mild tachycardia. No PE. No hypoxia.  No leukocytosis (did not meet sepsis criteria on arrival). CTS notified and chest tube to be placed.

## 2023-03-02 NOTE — ED STATDOCS - CLINICAL SUMMARY MEDICAL DECISION MAKING FREE TEXT BOX
83 y/o female with left sided CP mainly at night. Pt in NAD. Plan: labs, EKG, chest XR. Differential diagnosis includes reflux vs MSK vs less likely ACS. 83 y/o female with left sided CP mainly at night. Pt in NAD. Plan: labs, EKG, chest XR.   Differential diagnosis includes reflux, MSK, pneumonia, less likely ACS.

## 2023-03-02 NOTE — ED STATDOCS - CRITICAL CARE ATTENDING CONTRIBUTION TO CARE
Elements for critical care include direct patient care (not related to procedure), additional history taking, interpretation of diagnostic studies, documentation, consultation with other physicians, consult w/ pt's family directly relating to pts condition     I, Mayur Sands MD, personally saw the patient with GEORGIANA.  I have personally performed a face to face diagnostic evaluation on this patient.  I have reviewed the GEORGIANA note and agree with the history, exam, and plan of care, except as noted.  I personally saw the patient and performed a substantive portion of the visit including all aspects of the medical decision making.

## 2023-03-02 NOTE — ED STATDOCS - OBJECTIVE STATEMENT
83 y/o female with a PMHx of macular degeneration, emphysema of lung, environmental allergies, hiatal hernia, migraines TRAVIS, RA presents to the ED c/o intermittent chest discomfort radiates to left arm x3 days. Pt reports pain occurs when she lays flat. Pt recently carried heavy groceries. Former smoker. No other complaints at this time. PCP: Dr. Boxer.

## 2023-03-02 NOTE — ED STATDOCS - PROGRESS NOTE DETAILS
81 yo female with a PMH of copd (former smoker not on home O2), hiatal hernia (dc 1 year ago with Dr. Zheng), RA, presents with chest pain x 3 days. Pt states its worst at night when laying down with radiation to the L arm yesterday. Currently pain is not radiating but still having pain now. +slight cough Pt thought it was related to allergies and post nasal drip since she was with her grandchildren who have a dog with long hair. Tried to make an appointment with her PMD (Boxer) who told her to come to the ER.   EKG unremarkable except for a little tachycardia. Pain is not reproducible.   Will check labs, CXR, and reeval. -Oren Hernandez PA-C CT with L upper pneumo and consolidation. WIll treat for PNA and discussed case with BEAR Hopper from cardiothoracic. At bedside to place catheter for pneumothorax. Will admit. Pt and family were made aware of the plan and admission and agree with plan. -Oren Hernandez PA-C Discussed case with pulm Dr Negron. Will consult on her on the morning. -Oren Hernandez PA-C Hospitalist believes this should be a surgical admission instead of a medicine admission. Spoke again with thoracic PA. They will admit to their service under Dr. ADAIR acevedo. -Oren Hernandez PA-C

## 2023-03-02 NOTE — PATIENT PROFILE ADULT - DO YOU FEEL UNSAFE AT HOME, WORK, OR SCHOOL?
Spoke with patient, CT scan essentially negative, suspect problem is that of acid reflux with tracheal inflammation, patient feeling better on prednisone. Has appointment with pulmonary on Thursday   no

## 2023-03-02 NOTE — ED ADULT NURSE NOTE - OBJECTIVE STATEMENT
Pt C/O CHEST DISCOMFORT STARTED 3 days ago also pt was heaving left arm pain on and off. denies any radiation. pt is currently on iv treatment for RA once a month.

## 2023-03-02 NOTE — ED STATDOCS - CONSTITUTIONAL [-], MLM
no loss of consciousness, no gait abnormality, no headache, no sensory deficits, and no weakness.
no fever

## 2023-03-02 NOTE — H&P ADULT - NSHPSOURCEINFORD_GEN_ALL_CORE
Patient Libtayo Counseling- I discussed with the patient the risks of Libtayo including but not limited to nausea, vomiting, diarrhea, and bone or muscle pain.  The patient verbalized understanding of the proper use and possible adverse effects of Libtayo.  All of the patient's questions and concerns were addressed.

## 2023-03-02 NOTE — CONSULT NOTE ADULT - ASSESSMENT
#Moderate L PTX  #L pna will treat as CAP  #RA  - admit to cardiothoracic surgery  - Agree with rocephin and zithro for suspected GN aj organisms and coverage for other atypicals  - Chest tube management per cardiothoracic  - Cont pulse ox  - Blood cultures  - LABA/JOSHUA/LAMA  - Resume all other home meds ie MTX for known RA   -EKG  - DVT px      Will continue to follow along.   Time spent: 56 min       #Moderate L PTX  #L pna will treat as CAP  #RA on chronic steroids  - admit to cardiothoracic surgery  - s/p rocephin and zithro  - Rotate to cefepime and doxy for suspected GN aj organisms and coverage for other atypicals  - Chest tube management per cardiothoracic  - Cont pulse ox  - Blood cultures  - LABA/JOSHUA/LAMA  - Resume all other home meds ie MTX for known RA   -EKG  - DVT px      Will continue to follow along  Time spent: 56 min

## 2023-03-02 NOTE — CONSULT NOTE ADULT - SUBJECTIVE AND OBJECTIVE BOX
82 F with hpm copd not on home o2, former smoker, RA not on chronic steroids, migraines presented to [facility] with complaints of left sided chest pain worse when laying flat x 3 days . States the pain radiated to left arm. Was advcised by pcp to come to ED. CTA demonstrated moderate sized left pneumothorax with lll consolidation. No trauma.  Pt otherwise hemodynamically stable except for some mild tachycardia. No PE. No hypoxia.  No leukocytosis (did not meet sepsis criteria on arrival). CTS notified and chest tube to be placed. Hospitalist consulted for medical management.   ED course: Rocephin/renaldoo.        PAST MEDICAL/SURGICAL/FAMILY/SOCIAL HISTORY:    Past Medical, Past Surgical, and Family History:  PAST MEDICAL HISTORY:  Degeneration macular     Emphysema of lung controlled    Environmental allergies     Migraine headache     RA (rheumatoid arthritis).     PAST SURGICAL HISTORY:  H/O lumpectomy 2003    History of cataract surgery.     FAMILY HISTORY:  Family history of CVA, mother.     Social History:  · Social Concerns: None     Tobacco Usage:  · Tobacco Usage	Former smoker    ALLERGIES AND HOME MEDICATIONS:   Allergies:        Allergies:  	penicillin: Drug, Hives  	tramadol: Drug, Other, migraine            REVIEW OF SYSTEMS:    CONSTITUTIONAL: No weakness, fevers or chills  EYES/ENT: No visual changes;  No vertigo or throat pain   NECK: No pain or stiffness  RESPIRATORY: No cough, wheezing, hemoptysis; No shortness of breath  CARDIOVASCULAR: +cp  GASTROINTESTINAL: No abdominal or epigastric pain. No nausea, vomiting, or hematemesis; No diarrhea or constipation. No melena or hematochezia.  GENITOURINARY: No dysuria, frequency or hematuria  NEUROLOGICAL: No numbness or weakness  SKIN: No itching, burning, rashes, or lesions   All other review of systems is negative unless indicated above      ICU Vital Signs Last 24 Hrs  T(C): 36.8 (02 Mar 2023 11:53), Max: 36.8 (02 Mar 2023 11:53)  T(F): 98.2 (02 Mar 2023 11:53), Max: 98.2 (02 Mar 2023 11:53)  HR: 108 (02 Mar 2023 11:53) (108 - 117)  BP: 142/77 (02 Mar 2023 11:53) (142/77 - 142/77)  BP(mean): 92 (02 Mar 2023 11:53) (92 - 92)  ABP: --  ABP(mean): --  RR: 19 (02 Mar 2023 11:53) (19 - 19)  SpO2: 93% (02 Mar 2023 11:53) (93% - 94%)    O2 Parameters below as of 02 Mar 2023 11:53  Patient On (Oxygen Delivery Method): room air            PHYSICAL EXAM:    Constitutional: NAD, awake and alert  HEENT: PERR, EOMI, Normal Hearing, MMM  Neck: Soft and supple, No LAD, No JVD  Respiratory: diminshed air entry to L upper lung field  Cardiovascular: S1 and S2, regular rate and rhythm, no Murmurs, gallops or rubs  Gastrointestinal: Bowel Sounds present, soft, nontender, nondistended, no guarding, no rebound  Extremities: No peripheral edema  Vascular: 2+ peripheral pulses  Neurological: A/O x 3, no focal deficits  Musculoskeletal: 5/5 strength b/l upper and lower extremities  Skin: No rashes      < from: CT Angio Chest PE Protocol w/ IV Cont (03.02.23 @ 14:11) >    IMPRESSION:.    No pulmonary embolism.    Moderate-sized left pneumothorax.    Multifocal airspace opacities, including dominant consolidation at the   left base. Findings may represent infection and recommend CT chest   follow-up in 3 months to ensure clearing.    < end of copied text >  CBC:            15.2   9.89  )-----------( 255      ( 03-02-23 @ 12:50 )             45.6         Chem:         ( 03-02-23 @ 12:50 )    137  |  106  |  11  ----------------------------<  106<H>  3.6   |  28  |  0.59        Liver Functions: ( 03-02-23 @ 12:50 )  Alb: 3.8 g/dL / Pro: 7.9 gm/dL / ALK PHOS: 73 U/L / ALT: 41 U/L / AST: 44 U/L / GGT: x              Type & Screen:                    82 F with hpm copd not on home o2, former smoker, RA on actemra, migraines presented to [facility] with complaints of left sided chest pain worse when laying flat x 3 days . States the pain radiated to left arm. Was advcised by pcp to come to ED. CTA demonstrated moderate sized left pneumothorax with lll consolidation. No trauma.  Pt otherwise hemodynamically stable except for some mild tachycardia. No PE. No hypoxia.  No leukocytosis (did not meet sepsis criteria on arrival). CTS notified and chest tube to be placed. Hospitalist consulted for medical management.   ED course: Rocephin/renaldoo.        PAST MEDICAL/SURGICAL/FAMILY/SOCIAL HISTORY:    Past Medical, Past Surgical, and Family History:  PAST MEDICAL HISTORY:  Degeneration macular     Emphysema of lung controlled    Environmental allergies     Migraine headache     RA (rheumatoid arthritis).     PAST SURGICAL HISTORY:  H/O lumpectomy 2003    History of cataract surgery.     FAMILY HISTORY:  Family history of CVA, mother.     Social History:  · Social Concerns: None     Tobacco Usage:  · Tobacco Usage	Former smoker    ALLERGIES AND HOME MEDICATIONS:   Allergies:        Allergies:  	penicillin: Drug, Hives  	tramadol: Drug, Other, migraine            REVIEW OF SYSTEMS:    CONSTITUTIONAL: No weakness, fevers or chills  EYES/ENT: No visual changes;  No vertigo or throat pain   NECK: No pain or stiffness  RESPIRATORY: No cough, wheezing, hemoptysis; No shortness of breath  CARDIOVASCULAR: +cp  GASTROINTESTINAL: No abdominal or epigastric pain. No nausea, vomiting, or hematemesis; No diarrhea or constipation. No melena or hematochezia.  GENITOURINARY: No dysuria, frequency or hematuria  NEUROLOGICAL: No numbness or weakness  SKIN: No itching, burning, rashes, or lesions   All other review of systems is negative unless indicated above      ICU Vital Signs Last 24 Hrs  T(C): 36.8 (02 Mar 2023 11:53), Max: 36.8 (02 Mar 2023 11:53)  T(F): 98.2 (02 Mar 2023 11:53), Max: 98.2 (02 Mar 2023 11:53)  HR: 108 (02 Mar 2023 11:53) (108 - 117)  BP: 142/77 (02 Mar 2023 11:53) (142/77 - 142/77)  BP(mean): 92 (02 Mar 2023 11:53) (92 - 92)  ABP: --  ABP(mean): --  RR: 19 (02 Mar 2023 11:53) (19 - 19)  SpO2: 93% (02 Mar 2023 11:53) (93% - 94%)    O2 Parameters below as of 02 Mar 2023 11:53  Patient On (Oxygen Delivery Method): room air            PHYSICAL EXAM:    Constitutional: NAD, awake and alert  HEENT: PERR, EOMI, Normal Hearing, MMM  Neck: Soft and supple, No LAD, No JVD  Respiratory: diminshed air entry to L upper lung field  Cardiovascular: S1 and S2, regular rate and rhythm, no Murmurs, gallops or rubs  Gastrointestinal: Bowel Sounds present, soft, nontender, nondistended, no guarding, no rebound  Extremities: No peripheral edema  Vascular: 2+ peripheral pulses  Neurological: A/O x 3, no focal deficits  Musculoskeletal: 5/5 strength b/l upper and lower extremities  Skin: No rashes      < from: CT Angio Chest PE Protocol w/ IV Cont (03.02.23 @ 14:11) >    IMPRESSION:.    No pulmonary embolism.    Moderate-sized left pneumothorax.    Multifocal airspace opacities, including dominant consolidation at the   left base. Findings may represent infection and recommend CT chest   follow-up in 3 months to ensure clearing.    < end of copied text >  CBC:            15.2   9.89  )-----------( 255      ( 03-02-23 @ 12:50 )             45.6         Chem:         ( 03-02-23 @ 12:50 )    137  |  106  |  11  ----------------------------<  106<H>  3.6   |  28  |  0.59        Liver Functions: ( 03-02-23 @ 12:50 )  Alb: 3.8 g/dL / Pro: 7.9 gm/dL / ALK PHOS: 73 U/L / ALT: 41 U/L / AST: 44 U/L / GGT: x              Type & Screen:

## 2023-03-02 NOTE — ED ADULT NURSE NOTE - NSICDXPASTMEDICALHX_GEN_ALL_CORE_FT
rx sent. Pt notified.    PAST MEDICAL HISTORY:  Degeneration macular     Emphysema of lung controlled    Environmental allergies     Migraine headache     RA (rheumatoid arthritis)

## 2023-03-02 NOTE — ED ADULT TRIAGE NOTE - CHIEF COMPLAINT QUOTE
Pt presents to ED ambulatory for chest discomfort x3 days. Pt denies cardiac hx. Pt states that the chest pain is mild, radiated to her left arm yesterday. Pt denies any pain to left arm today. Denies nausea/vomiting, palpitations, headache, dizziness, SOB. , O2 sat 94% in triage. EKG in process.

## 2023-03-03 LAB
ALBUMIN SERPL ELPH-MCNC: 3.3 G/DL — SIGNIFICANT CHANGE UP (ref 3.3–5)
ALP SERPL-CCNC: 65 U/L — SIGNIFICANT CHANGE UP (ref 40–120)
ALT FLD-CCNC: 36 U/L — SIGNIFICANT CHANGE UP (ref 12–78)
ANION GAP SERPL CALC-SCNC: 5 MMOL/L — SIGNIFICANT CHANGE UP (ref 5–17)
AST SERPL-CCNC: 28 U/L — SIGNIFICANT CHANGE UP (ref 15–37)
BASOPHILS # BLD AUTO: 0.06 K/UL — SIGNIFICANT CHANGE UP (ref 0–0.2)
BASOPHILS NFR BLD AUTO: 0.7 % — SIGNIFICANT CHANGE UP (ref 0–2)
BILIRUB SERPL-MCNC: 0.7 MG/DL — SIGNIFICANT CHANGE UP (ref 0.2–1.2)
BUN SERPL-MCNC: 10 MG/DL — SIGNIFICANT CHANGE UP (ref 7–23)
CALCIUM SERPL-MCNC: 9.1 MG/DL — SIGNIFICANT CHANGE UP (ref 8.5–10.1)
CHLORIDE SERPL-SCNC: 103 MMOL/L — SIGNIFICANT CHANGE UP (ref 96–108)
CO2 SERPL-SCNC: 28 MMOL/L — SIGNIFICANT CHANGE UP (ref 22–31)
CREAT SERPL-MCNC: 0.61 MG/DL — SIGNIFICANT CHANGE UP (ref 0.5–1.3)
EGFR: 90 ML/MIN/1.73M2 — SIGNIFICANT CHANGE UP
EOSINOPHIL # BLD AUTO: 0.04 K/UL — SIGNIFICANT CHANGE UP (ref 0–0.5)
EOSINOPHIL NFR BLD AUTO: 0.4 % — SIGNIFICANT CHANGE UP (ref 0–6)
GLUCOSE SERPL-MCNC: 144 MG/DL — HIGH (ref 70–99)
HCT VFR BLD CALC: 40.5 % — SIGNIFICANT CHANGE UP (ref 34.5–45)
HGB BLD-MCNC: 13.5 G/DL — SIGNIFICANT CHANGE UP (ref 11.5–15.5)
IMM GRANULOCYTES NFR BLD AUTO: 0.2 % — SIGNIFICANT CHANGE UP (ref 0–0.9)
LYMPHOCYTES # BLD AUTO: 2.11 K/UL — SIGNIFICANT CHANGE UP (ref 1–3.3)
LYMPHOCYTES # BLD AUTO: 23.2 % — SIGNIFICANT CHANGE UP (ref 13–44)
MCHC RBC-ENTMCNC: 32.5 PG — SIGNIFICANT CHANGE UP (ref 27–34)
MCHC RBC-ENTMCNC: 33.3 GM/DL — SIGNIFICANT CHANGE UP (ref 32–36)
MCV RBC AUTO: 97.4 FL — SIGNIFICANT CHANGE UP (ref 80–100)
MONOCYTES # BLD AUTO: 0.62 K/UL — SIGNIFICANT CHANGE UP (ref 0–0.9)
MONOCYTES NFR BLD AUTO: 6.8 % — SIGNIFICANT CHANGE UP (ref 2–14)
NEUTROPHILS # BLD AUTO: 6.25 K/UL — SIGNIFICANT CHANGE UP (ref 1.8–7.4)
NEUTROPHILS NFR BLD AUTO: 68.7 % — SIGNIFICANT CHANGE UP (ref 43–77)
PLATELET # BLD AUTO: 202 K/UL — SIGNIFICANT CHANGE UP (ref 150–400)
POTASSIUM SERPL-MCNC: 4.1 MMOL/L — SIGNIFICANT CHANGE UP (ref 3.5–5.3)
POTASSIUM SERPL-SCNC: 4.1 MMOL/L — SIGNIFICANT CHANGE UP (ref 3.5–5.3)
PROT SERPL-MCNC: 6.8 GM/DL — SIGNIFICANT CHANGE UP (ref 6–8.3)
RBC # BLD: 4.16 M/UL — SIGNIFICANT CHANGE UP (ref 3.8–5.2)
RBC # FLD: 13.2 % — SIGNIFICANT CHANGE UP (ref 10.3–14.5)
SODIUM SERPL-SCNC: 136 MMOL/L — SIGNIFICANT CHANGE UP (ref 135–145)
WBC # BLD: 9.1 K/UL — SIGNIFICANT CHANGE UP (ref 3.8–10.5)
WBC # FLD AUTO: 9.1 K/UL — SIGNIFICANT CHANGE UP (ref 3.8–10.5)

## 2023-03-03 PROCEDURE — 99221 1ST HOSP IP/OBS SF/LOW 40: CPT

## 2023-03-03 PROCEDURE — 99232 SBSQ HOSP IP/OBS MODERATE 35: CPT

## 2023-03-03 PROCEDURE — 71045 X-RAY EXAM CHEST 1 VIEW: CPT | Mod: 26

## 2023-03-03 RX ORDER — LIDOCAINE 4 G/100G
1 CREAM TOPICAL DAILY
Refills: 0 | Status: DISCONTINUED | OUTPATIENT
Start: 2023-03-03 | End: 2023-03-06

## 2023-03-03 RX ORDER — ENOXAPARIN SODIUM 100 MG/ML
40 INJECTION SUBCUTANEOUS EVERY 24 HOURS
Refills: 0 | Status: DISCONTINUED | OUTPATIENT
Start: 2023-03-03 | End: 2023-03-06

## 2023-03-03 RX ORDER — SODIUM CHLORIDE 0.65 %
1 AEROSOL, SPRAY (ML) NASAL
Refills: 0 | Status: DISCONTINUED | OUTPATIENT
Start: 2023-03-03 | End: 2023-03-06

## 2023-03-03 RX ORDER — ACETAMINOPHEN 500 MG
725 TABLET ORAL ONCE
Refills: 0 | Status: COMPLETED | OUTPATIENT
Start: 2023-03-03 | End: 2023-03-03

## 2023-03-03 RX ADMIN — LIDOCAINE 1 PATCH: 4 CREAM TOPICAL at 11:48

## 2023-03-03 RX ADMIN — LIDOCAINE 1 PATCH: 4 CREAM TOPICAL at 21:29

## 2023-03-03 RX ADMIN — Medication 725 MILLIGRAM(S): at 12:19

## 2023-03-03 RX ADMIN — ENOXAPARIN SODIUM 40 MILLIGRAM(S): 100 INJECTION SUBCUTANEOUS at 18:33

## 2023-03-03 RX ADMIN — BUDESONIDE AND FORMOTEROL FUMARATE DIHYDRATE 2 PUFF(S): 160; 4.5 AEROSOL RESPIRATORY (INHALATION) at 08:35

## 2023-03-03 RX ADMIN — CEFEPIME 100 MILLIGRAM(S): 1 INJECTION, POWDER, FOR SOLUTION INTRAMUSCULAR; INTRAVENOUS at 05:14

## 2023-03-03 RX ADMIN — CEFEPIME 100 MILLIGRAM(S): 1 INJECTION, POWDER, FOR SOLUTION INTRAMUSCULAR; INTRAVENOUS at 17:32

## 2023-03-03 RX ADMIN — Medication 2 SPRAY(S): at 17:54

## 2023-03-03 RX ADMIN — Medication 4 MILLIGRAM(S): at 09:35

## 2023-03-03 RX ADMIN — Medication 2 SPRAY(S): at 00:04

## 2023-03-03 RX ADMIN — SODIUM CHLORIDE 3 MILLILITER(S): 9 INJECTION INTRAMUSCULAR; INTRAVENOUS; SUBCUTANEOUS at 05:36

## 2023-03-03 RX ADMIN — Medication 650 MILLIGRAM(S): at 17:17

## 2023-03-03 RX ADMIN — Medication 2 SPRAY(S): at 05:34

## 2023-03-03 RX ADMIN — Medication 1 SPRAY(S): at 12:59

## 2023-03-03 RX ADMIN — Medication 650 MILLIGRAM(S): at 17:47

## 2023-03-03 RX ADMIN — SODIUM CHLORIDE 3 MILLILITER(S): 9 INJECTION INTRAMUSCULAR; INTRAVENOUS; SUBCUTANEOUS at 21:53

## 2023-03-03 RX ADMIN — Medication 110 MILLIGRAM(S): at 18:26

## 2023-03-03 RX ADMIN — Medication 290 MILLIGRAM(S): at 11:49

## 2023-03-03 RX ADMIN — SODIUM CHLORIDE 3 MILLILITER(S): 9 INJECTION INTRAMUSCULAR; INTRAVENOUS; SUBCUTANEOUS at 13:24

## 2023-03-03 RX ADMIN — Medication 2 SPRAY(S): at 18:19

## 2023-03-03 RX ADMIN — Medication 2 SPRAY(S): at 05:35

## 2023-03-03 RX ADMIN — Medication 110 MILLIGRAM(S): at 05:18

## 2023-03-03 RX ADMIN — BUDESONIDE AND FORMOTEROL FUMARATE DIHYDRATE 2 PUFF(S): 160; 4.5 AEROSOL RESPIRATORY (INHALATION) at 20:47

## 2023-03-03 NOTE — PROGRESS NOTE ADULT - SUBJECTIVE AND OBJECTIVE BOX
82 F with hpm copd not on home o2, former smoker, RA on actemra, migraines presented to [facility] with complaints of left sided chest pain worse when laying flat x 3 days . States the pain radiated to left arm. Was advcised by pcp to come to ED. CTA demonstrated moderate sized left pneumothorax with lll consolidation. No trauma.  Pt otherwise hemodynamically stable except for some mild tachycardia. No PE. No hypoxia.  No leukocytosis (did not meet sepsis criteria on arrival). CTS notified and chest tube to be placed. Hospitalist consulted for medical management.   ED course: Rocephin/renaldoo.        PAST MEDICAL/SURGICAL/FAMILY/SOCIAL HISTORY:    Past Medical, Past Surgical, and Family History:  PAST MEDICAL HISTORY:  Degeneration macular     Emphysema of lung controlled    Environmental allergies     Migraine headache     RA (rheumatoid arthritis).     PAST SURGICAL HISTORY:  H/O lumpectomy 2003    History of cataract surgery.     FAMILY HISTORY:  Family history of CVA, mother.     Social History:  · Social Concerns: None     Tobacco Usage:  · Tobacco Usage	Former smoker    ALLERGIES AND HOME MEDICATIONS:   Allergies:        Allergies:  	penicillin: Drug, Hives  	tramadol: Drug, Other, migraine            REVIEW OF SYSTEMS:    CONSTITUTIONAL: No weakness, fevers or chills  EYES/ENT: No visual changes;  No vertigo or throat pain   NECK: No pain or stiffness  RESPIRATORY: No cough, wheezing, hemoptysis; No shortness of breath  CARDIOVASCULAR: +cp  GASTROINTESTINAL: No abdominal or epigastric pain. No nausea, vomiting, or hematemesis; No diarrhea or constipation. No melena or hematochezia.  GENITOURINARY: No dysuria, frequency or hematuria  NEUROLOGICAL: No numbness or weakness  SKIN: No itching, burning, rashes, or lesions   All other review of systems is negative unless indicated above      ICU Vital Signs Last 24 Hrs  T(C): 36.8 (02 Mar 2023 11:53), Max: 36.8 (02 Mar 2023 11:53)  T(F): 98.2 (02 Mar 2023 11:53), Max: 98.2 (02 Mar 2023 11:53)  HR: 108 (02 Mar 2023 11:53) (108 - 117)  BP: 142/77 (02 Mar 2023 11:53) (142/77 - 142/77)  BP(mean): 92 (02 Mar 2023 11:53) (92 - 92)  ABP: --  ABP(mean): --  RR: 19 (02 Mar 2023 11:53) (19 - 19)  SpO2: 93% (02 Mar 2023 11:53) (93% - 94%)    O2 Parameters below as of 02 Mar 2023 11:53  Patient On (Oxygen Delivery Method): room air            PHYSICAL EXAM:    Constitutional: NAD, awake and alert  HEENT: PERR, EOMI, Normal Hearing, MMM  Neck: Soft and supple, No LAD, No JVD  Respiratory: diminshed air entry to L upper lung field  Cardiovascular: S1 and S2, regular rate and rhythm, no Murmurs, gallops or rubs  Gastrointestinal: Bowel Sounds present, soft, nontender, nondistended, no guarding, no rebound  Extremities: No peripheral edema  Vascular: 2+ peripheral pulses  Neurological: A/O x 3, no focal deficits  Musculoskeletal: 5/5 strength b/l upper and lower extremities  Skin: No rashes      < from: CT Angio Chest PE Protocol w/ IV Cont (03.02.23 @ 14:11) >    IMPRESSION:.    No pulmonary embolism.    Moderate-sized left pneumothorax.    Multifocal airspace opacities, including dominant consolidation at the   left base. Findings may represent infection and recommend CT chest   follow-up in 3 months to ensure clearing.    < end of copied text >  CBC:            15.2   9.89  )-----------( 255      ( 03-02-23 @ 12:50 )             45.6         Chem:         ( 03-02-23 @ 12:50 )    137  |  106  |  11  ----------------------------<  106<H>  3.6   |  28  |  0.59        Liver Functions: ( 03-02-23 @ 12:50 )  Alb: 3.8 g/dL / Pro: 7.9 gm/dL / ALK PHOS: 73 U/L / ALT: 41 U/L / AST: 44 U/L / GGT: x              Type & Screen:         Assessment and Recommendation:   · Assessment	        #Moderate L PTX  #L pna will treat as CAP  #RA on chronic steroids  - Cont to cefepime and doxy for suspected GN aj organisms and coverage for other atypicals  - Chest tube management per cardiothoracic to suction  - cxr appears improved on cxr  - Cont pulse ox  - Blood cultures  - LABA/JOSHUA/LAMA  - Resume all other home meds ie MTX for known RA   -EKG  - DVT px

## 2023-03-03 NOTE — PROGRESS NOTE ADULT - SUBJECTIVE AND OBJECTIVE BOX
Subjective: Pt seen at bedside. Sitting in chair     T(C): 36.5 (03-03-23 @ 09:01), Max: 36.8 (03-02-23 @ 11:53)  HR: 92 (03-03-23 @ 09:01) (82 - 117)  BP: 98/68 (03-03-23 @ 09:01) (98/68 - 154/74)  ABP: --  ABP(mean): --  RR: 18 (03-03-23 @ 09:01) (16 - 20)  SpO2: 100% (03-03-23 @ 09:01) (93% - 100%) RA   Wt(kg): --  CVP(mm Hg): --  CO: --  CI: --  PA: --                                              Tele: SR    CHEST TUBE:     30cc                          OUTPUT:     per 24 hours    AIR LEAKS:  [ ] YES [x ] NO          03-03    136  |  103  |  10  ----------------------------<  144<H>  4.1   |  28  |  0.61    Ca    9.1      03 Mar 2023 07:57    TPro  6.8  /  Alb  3.3  /  TBili  0.7  /  DBili  x   /  AST  28  /  ALT  36  /  AlkPhos  65  03-03                               13.5   9.10  )-----------( 202      ( 03 Mar 2023 07:57 )             40.5        PT/INR - ( 02 Mar 2023 12:50 )   PT: 11.6 sec;   INR: 1.00 ratio         PTT - ( 02 Mar 2023 12:50 )  PTT:27.0 sec         CAPILLARY BLOOD GLUCOSE               CXR: resolved left ptx, no effusons          Exam   Neuro:  Alert Awake NAD  Pulm: decreased b/l + Left Pigtail   CV: RRR   Abd:  soft   Extremities: warm         Assessment:  82yFemale    with PAST MEDICAL & SURGICAL HISTORY:  RA (rheumatoid arthritis)      Emphysema of lung  controlled      Degeneration macular      Environmental allergies      Migraine headache      H/O lumpectomy  2003      History of cataract surgery            Plan:

## 2023-03-04 PROCEDURE — 99232 SBSQ HOSP IP/OBS MODERATE 35: CPT

## 2023-03-04 PROCEDURE — 71045 X-RAY EXAM CHEST 1 VIEW: CPT | Mod: 26

## 2023-03-04 RX ADMIN — SODIUM CHLORIDE 3 MILLILITER(S): 9 INJECTION INTRAMUSCULAR; INTRAVENOUS; SUBCUTANEOUS at 05:27

## 2023-03-04 RX ADMIN — SODIUM CHLORIDE 3 MILLILITER(S): 9 INJECTION INTRAMUSCULAR; INTRAVENOUS; SUBCUTANEOUS at 20:26

## 2023-03-04 RX ADMIN — ENOXAPARIN SODIUM 40 MILLIGRAM(S): 100 INJECTION SUBCUTANEOUS at 17:59

## 2023-03-04 RX ADMIN — LIDOCAINE 1 PATCH: 4 CREAM TOPICAL at 20:26

## 2023-03-04 RX ADMIN — Medication 2 SPRAY(S): at 05:17

## 2023-03-04 RX ADMIN — Medication 110 MILLIGRAM(S): at 21:18

## 2023-03-04 RX ADMIN — BUDESONIDE AND FORMOTEROL FUMARATE DIHYDRATE 2 PUFF(S): 160; 4.5 AEROSOL RESPIRATORY (INHALATION) at 19:55

## 2023-03-04 RX ADMIN — Medication 110 MILLIGRAM(S): at 05:14

## 2023-03-04 RX ADMIN — Medication 4 MILLIGRAM(S): at 09:25

## 2023-03-04 RX ADMIN — SODIUM CHLORIDE 3 MILLILITER(S): 9 INJECTION INTRAMUSCULAR; INTRAVENOUS; SUBCUTANEOUS at 13:29

## 2023-03-04 RX ADMIN — CEFEPIME 100 MILLIGRAM(S): 1 INJECTION, POWDER, FOR SOLUTION INTRAMUSCULAR; INTRAVENOUS at 05:14

## 2023-03-04 RX ADMIN — CEFEPIME 100 MILLIGRAM(S): 1 INJECTION, POWDER, FOR SOLUTION INTRAMUSCULAR; INTRAVENOUS at 17:59

## 2023-03-04 RX ADMIN — Medication 2 SPRAY(S): at 05:14

## 2023-03-04 RX ADMIN — BUDESONIDE AND FORMOTEROL FUMARATE DIHYDRATE 2 PUFF(S): 160; 4.5 AEROSOL RESPIRATORY (INHALATION) at 09:13

## 2023-03-04 RX ADMIN — Medication 2 SPRAY(S): at 17:50

## 2023-03-04 RX ADMIN — LIDOCAINE 1 PATCH: 4 CREAM TOPICAL at 09:25

## 2023-03-04 NOTE — PROGRESS NOTE ADULT - SUBJECTIVE AND OBJECTIVE BOX
Patient is a 82y old  Female who presents with a chief complaint of SOB + Left PTX (03 Mar 2023 11:10)      Subjective: Patient seen sitting in chair. Denies any complaints.      PAST MEDICAL & SURGICAL HISTORY:  RA (rheumatoid arthritis)      Emphysema of lung  controlled      Degeneration macular      Environmental allergies      Migraine headache      H/O lumpectomy  2003      History of cataract surgery          Review of Systems:  CONSTITUTIONAL: No fever, chills, or fatigue  EYES: No eye pain, visual disturbances, or discharge  ENMT:  No difficulty hearing, tinnitus, vertigo; No sinus or throat pain  NECK: No pain or stiffness  RESPIRATORY: No cough, wheezing, chills or hemoptysis; No shortness of breath  CARDIOVASCULAR: No chest pain, palpitations, dizziness, or leg swelling  GASTROINTESTINAL: No abdominal or epigastric pain. No nausea, vomiting, or hematemesis; No diarrhea or constipation. No melena or hematochezia.  GENITOURINARY: No dysuria, frequency, hematuria, or incontinence  NEUROLOGICAL: No headaches, memory loss, loss of strength, numbness, or tremors  SKIN: No itching, burning, rashes, or lesions   MUSCULOSKELETAL: No joint pain or swelling; No muscle, back, or extremity pain  PSYCHIATRIC: No depression, anxiety, mood swings, or difficulty sleeping      Medications:  cefepime   IVPB      cefepime   IVPB 2000 milliGRAM(s) IV Intermittent every 12 hours  doxycycline IVPB 100 milliGRAM(s) IV Intermittent every 12 hours  doxycycline IVPB          albuterol    90 MICROgram(s) HFA Inhaler 2 Puff(s) Inhalation every 6 hours PRN  budesonide  80 MICROgram(s)/formoterol 4.5 MICROgram(s) Inhaler 2 Puff(s) Inhalation two times a day    acetaminophen     Tablet .. 650 milliGRAM(s) Oral every 6 hours PRN  oxyCODONE    IR 5 milliGRAM(s) Oral every 4 hours PRN  oxyCODONE    IR 10 milliGRAM(s) Oral every 4 hours PRN      enoxaparin Injectable 40 milliGRAM(s) SubCutaneous every 24 hours        predniSONE   Tablet 4 milliGRAM(s) Oral daily    sodium chloride 0.9% lock flush 3 milliLiter(s) IV Push every 8 hours      fluticasone propionate 50 MICROgram(s)/spray Nasal Spray 2 Spray(s) Both Nostrils every 12 hours  ipratropium 42 MICROgram(s) Nasal for Sublingual Use 2 Spray(s) SubLingual two times a day  lidocaine   4% Patch 1 Patch Transdermal daily  sodium chloride 0.65% Nasal 1 Spray(s) Both Nostrils two times a day PRN            ICU Vital Signs Last 24 Hrs  T(C): 36.3 (04 Mar 2023 05:24), Max: 36.8 (03 Mar 2023 15:31)  T(F): 97.4 (04 Mar 2023 05:24), Max: 98.2 (03 Mar 2023 15:31)  HR: 86 (04 Mar 2023 05:24) (85 - 92)  BP: 137/72 (04 Mar 2023 05:24) (95/47 - 137/72)  BP(mean): --  ABP: --  ABP(mean): --  RR: 18 (04 Mar 2023 05:24) (18 - 20)  SpO2: 100% (04 Mar 2023 05:24) (97% - 100%)    O2 Parameters below as of 04 Mar 2023 05:24  Patient On (Oxygen Delivery Method): nasal cannula                I&O's Detail    03 Mar 2023 07:01  -  04 Mar 2023 07:00  --------------------------------------------------------  IN:  Total IN: 0 mL    OUT:    Chest Tube (mL): 22 mL  Total OUT: 22 mL    Total NET: -22 mL            LABS:                        13.5   9.10  )-----------( 202      ( 03 Mar 2023 07:57 )             40.5     03-03    136  |  103  |  10  ----------------------------<  144<H>  4.1   |  28  |  0.61    Ca    9.1      03 Mar 2023 07:57    TPro  6.8  /  Alb  3.3  /  TBili  0.7  /  DBili  x   /  AST  28  /  ALT  36  /  AlkPhos  65  03-03          CAPILLARY BLOOD GLUCOSE        PT/INR - ( 02 Mar 2023 12:50 )   PT: 11.6 sec;   INR: 1.00 ratio         PTT - ( 02 Mar 2023 12:50 )  PTT:27.0 sec    CULTURES:  Culture Results:   No growth to date. (03-02-23 @ 15:12)  Culture Results:   No growth to date. (03-02-23 @ 15:05)      Physical Examination:    General: No acute distress.      PULM: Clear to auscultation bilaterally, no significant sputum production    CVS: Regular rate and rhythm,     ABD: Soft, nondistended, nontender,    EXT: No edema, nontender            RADIOLOGY: < from: Xray Chest 1 View- PORTABLE-Routine (Xray Chest 1 View- PORTABLE-Routine in AM.) (03.03.23 @ 07:28) >  ACC: [id number] EXAM:  XR CHEST PORTABLE ROUTINE 1V   ORDERED BY: GIOVANNI KIMBROUGH     PROCEDURE DATE:  03/03/2023          INTERPRETATION:  INDICATION: Short of breath. Pneumothorax    Portable chest 6:58 AM    COMPARISON: 3/2/2023    FINDINGS:  Heart/Vascular: The heart size, mediastinum, hilum and aorta are within   normal limits for projection.  Pulmonary: Midline trachea. There are again diffuse increased   interstitial markings. Pigtail chest tube left base with decreasing now   trace residual apical pneumothorax there is decreasing airspace   infiltrates at the bases.  Bones: There is no fracture.    Impression:    There are again diffuse increased interstitial markings.  Pigtail chest tube left base with decreasing now trace residual apical   pneumothorax.  Decreasing airspace infiltrates at the bases.    --- End of Report ---    < end of copied text >

## 2023-03-04 NOTE — PHARMACOTHERAPY INTERVENTION NOTE - COMMENTS
Modified penicillin allergy to state patient tolerated cefepime during this admission.    Roberto Gomes, PharmD  Clinical Pharmacy Specialist, Infectious Diseases  Tele-Antimicrobial Stewardship Program (Tele-[facility])  Tele-ASP Phone: [phone] 
Med history complete, reviewed medications and allergies with patient and confirmed medication list with doctor first med profile, all medication related questions answered

## 2023-03-04 NOTE — PROGRESS NOTE ADULT - SUBJECTIVE AND OBJECTIVE BOX
82 F with hpm copd not on home o2, former smoker, RA on actemra, migraines presented to [facility] with complaints of left sided chest pain worse when laying flat x 3 days . States the pain radiated to left arm. Was advcised by pcp to come to ED. CTA demonstrated moderate sized left pneumothorax with lll consolidation. No trauma.  Pt otherwise hemodynamically stable except for some mild tachycardia. No PE. No hypoxia.  No leukocytosis (did not meet sepsis criteria on arrival). CTS notified and chest tube to be placed. Hospitalist consulted for medical management.   ED course: Rocephin/zithro.      sub: endorses pain at site of pigtail insertion. Otherwise doing well        PAST MEDICAL/SURGICAL/FAMILY/SOCIAL HISTORY:    Past Medical, Past Surgical, and Family History:  PAST MEDICAL HISTORY:  Degeneration macular     Emphysema of lung controlled    Environmental allergies     Migraine headache     RA (rheumatoid arthritis).     PAST SURGICAL HISTORY:  H/O lumpectomy 2003    History of cataract surgery.     FAMILY HISTORY:  Family history of CVA, mother.     Social History:  · Social Concerns: None     Tobacco Usage:  · Tobacco Usage	Former smoker    ALLERGIES AND HOME MEDICATIONS:   Allergies:        Allergies:  	penicillin: Drug, Hives  	tramadol: Drug, Other, migraine            REVIEW OF SYSTEMS:    CONSTITUTIONAL: No weakness, fevers or chills  EYES/ENT: No visual changes;  No vertigo or throat pain   NECK: No pain or stiffness  RESPIRATORY: No cough, wheezing, hemoptysis; No shortness of breath  CARDIOVASCULAR: +cp  GASTROINTESTINAL: No abdominal or epigastric pain. No nausea, vomiting, or hematemesis; No diarrhea or constipation. No melena or hematochezia.  GENITOURINARY: No dysuria, frequency or hematuria  NEUROLOGICAL: No numbness or weakness  SKIN: No itching, burning, rashes, or lesions   All other review of systems is negative unless indicated above      ICU Vital Signs Last 24 Hrs  T(C): 36.3 (04 Mar 2023 08:49), Max: 36.8 (03 Mar 2023 15:31)  T(F): 97.4 (04 Mar 2023 08:49), Max: 98.2 (03 Mar 2023 15:31)  HR: 83 (04 Mar 2023 08:49) (83 - 89)  BP: 124/72 (04 Mar 2023 08:49) (95/47 - 137/72)  BP(mean): --  ABP: --  ABP(mean): --  RR: 18 (04 Mar 2023 08:49) (18 - 20)  SpO2: 99% (04 Mar 2023 08:49) (97% - 100%)    O2 Parameters below as of 04 Mar 2023 08:49  Patient On (Oxygen Delivery Method): nasal cannula  O2 Flow (L/min): 2        Patient On (Oxygen Delivery Method): room air            PHYSICAL EXAM:    Constitutional: NAD, awake and alert  HEENT: PERR, EOMI, Normal Hearing, MMM  Neck: Soft and supple, No LAD, No JVD  Respiratory: diminshed air entry to L upper lung field  Cardiovascular: S1 and S2, regular rate and rhythm, no Murmurs, gallops or rubs  Gastrointestinal: Bowel Sounds present, soft, nontender, nondistended, no guarding, no rebound  Extremities: No peripheral edema  Vascular: 2+ peripheral pulses  Neurological: A/O x 3, no focal deficits  Musculoskeletal: 5/5 strength b/l upper and lower extremities  Skin: No rashes      < from: CT Angio Chest PE Protocol w/ IV Cont (03.02.23 @ 14:11) >    IMPRESSION:.    No pulmonary embolism.    Moderate-sized left pneumothorax.    Multifocal airspace opacities, including dominant consolidation at the   left base. Findings may represent infection and recommend CT chest   follow-up in 3 months to ensure clearing.    < end of copied text >  CBC:            15.2   9.89  )-----------( 255      ( 03-02-23 @ 12:50 )             45.6         Chem:         ( 03-02-23 @ 12:50 )    137  |  106  |  11  ----------------------------<  106<H>  3.6   |  28  |  0.59        Liver Functions: ( 03-02-23 @ 12:50 )  Alb: 3.8 g/dL / Pro: 7.9 gm/dL / ALK PHOS: 73 U/L / ALT: 41 U/L / AST: 44 U/L / GGT: x              Type & Screen:         Assessment and Recommendation:   · Assessment	        #Moderate L PTX  #L pna will treat as CAP  #RA on chronic steroids  - Cont to cefepime and doxy for suspected GN aj organisms and coverage for other atypicals - repeat CXR shows clearing of LLL infiltrates  - Chest tube management per cardiothoracic to suction - repeat cxr shows near complete aeration of L lung  - Blood cultures: NGTD  - LABA/JOSHUA/LAMA  - Resume all other home meds ie MTX for known RA  - DVT px

## 2023-03-05 PROCEDURE — 71045 X-RAY EXAM CHEST 1 VIEW: CPT | Mod: 26,77

## 2023-03-05 PROCEDURE — 71045 X-RAY EXAM CHEST 1 VIEW: CPT | Mod: 26

## 2023-03-05 PROCEDURE — 99232 SBSQ HOSP IP/OBS MODERATE 35: CPT

## 2023-03-05 RX ADMIN — Medication 2 SPRAY(S): at 18:16

## 2023-03-05 RX ADMIN — BUDESONIDE AND FORMOTEROL FUMARATE DIHYDRATE 2 PUFF(S): 160; 4.5 AEROSOL RESPIRATORY (INHALATION) at 20:27

## 2023-03-05 RX ADMIN — LIDOCAINE 1 PATCH: 4 CREAM TOPICAL at 21:26

## 2023-03-05 RX ADMIN — Medication 2 SPRAY(S): at 05:43

## 2023-03-05 RX ADMIN — Medication 650 MILLIGRAM(S): at 09:07

## 2023-03-05 RX ADMIN — SODIUM CHLORIDE 3 MILLILITER(S): 9 INJECTION INTRAMUSCULAR; INTRAVENOUS; SUBCUTANEOUS at 21:25

## 2023-03-05 RX ADMIN — BUDESONIDE AND FORMOTEROL FUMARATE DIHYDRATE 2 PUFF(S): 160; 4.5 AEROSOL RESPIRATORY (INHALATION) at 13:31

## 2023-03-05 RX ADMIN — CEFEPIME 100 MILLIGRAM(S): 1 INJECTION, POWDER, FOR SOLUTION INTRAMUSCULAR; INTRAVENOUS at 18:15

## 2023-03-05 RX ADMIN — Medication 4 MILLIGRAM(S): at 09:10

## 2023-03-05 RX ADMIN — LIDOCAINE 1 PATCH: 4 CREAM TOPICAL at 09:10

## 2023-03-05 RX ADMIN — Medication 650 MILLIGRAM(S): at 09:45

## 2023-03-05 RX ADMIN — LIDOCAINE 1 PATCH: 4 CREAM TOPICAL at 21:25

## 2023-03-05 RX ADMIN — ENOXAPARIN SODIUM 40 MILLIGRAM(S): 100 INJECTION SUBCUTANEOUS at 18:14

## 2023-03-05 RX ADMIN — SODIUM CHLORIDE 3 MILLILITER(S): 9 INJECTION INTRAMUSCULAR; INTRAVENOUS; SUBCUTANEOUS at 06:35

## 2023-03-05 RX ADMIN — CEFEPIME 100 MILLIGRAM(S): 1 INJECTION, POWDER, FOR SOLUTION INTRAMUSCULAR; INTRAVENOUS at 05:43

## 2023-03-05 RX ADMIN — Medication 110 MILLIGRAM(S): at 09:11

## 2023-03-05 RX ADMIN — SODIUM CHLORIDE 3 MILLILITER(S): 9 INJECTION INTRAMUSCULAR; INTRAVENOUS; SUBCUTANEOUS at 12:22

## 2023-03-05 RX ADMIN — Medication 110 MILLIGRAM(S): at 21:28

## 2023-03-05 RX ADMIN — Medication 2 SPRAY(S): at 18:14

## 2023-03-05 NOTE — PROGRESS NOTE ADULT - ASSESSMENT
82 F with hpm copd not on home o2, former smoker, RA on actemra, migraines presented to [facility] with complaints of left sided chest pain worse when laying flat x 3 days found to have moderate Left PTX on CT scan. Pigtail Placed in ER with resolution of PTX        Plan   maintain pigtail to suction today    Hospitalist consult for treatment of PNA   Pigtail placed to -20 LWCS  Regular diet   Pt may ambulate to restroom on WS   Home meds restarted   CXR in am   Cefepime and doxy ordered for PNA  DW Dr Hancock/Thoracic surgery 
82 F with hpm copd not on home o2, former smoker, RA on actemra, migraines presented to [facility] with complaints of left sided chest pain worse when laying flat x 3 days found to have moderate Left PTX on CT scan. Pigtail Placed in ER with resolution of PTX        Plan   maintain pigtail to suction today    Pigtail to maintain to -20 LWCS. No air leak noted today.   Regular diet   Pt may ambulate to restroom on WS   Home meds restarted   CXR in am pending. Will f/u   Cefepime and doxy ordered for PNA    To be discussed w/ Dr. Chou 
83 y/o F with PMHx COPD (not on home O2), tobacco abuse, RA on actemra, and migraines who presented to ER on 3/2 complaining of left-sided chest pain. Imaging revealed moderate left pneumothorax. Pigtail catheter placed with resolution of pneumothorax.    PLAN:  - Place pigtail catheter on water seal.  - Repeat CXR at 15:00.  - Ok to ambulate.  - Regular diet.  - Continue home medications.  - Hospitalist service following for treatment of PNA. Continue cefepime and doxycycline.    Case discussed with thoracic surgeon, Dr. Chou.

## 2023-03-05 NOTE — PROGRESS NOTE ADULT - SUBJECTIVE AND OBJECTIVE BOX
82 F with hpm copd not on home o2, former smoker, RA on actemra, migraines presented to [facility] with complaints of left sided chest pain worse when laying flat x 3 days . States the pain radiated to left arm. Was advcised by pcp to come to ED. CTA demonstrated moderate sized left pneumothorax with lll consolidation. No trauma.  Pt otherwise hemodynamically stable except for some mild tachycardia. No PE. No hypoxia.  No leukocytosis (did not meet sepsis criteria on arrival). CTS notified and chest tube to be placed. Hospitalist consulted for medical management.   ED course: Rocephin/zithro.      sub: endorses pain at site of pigtail insertion. Otherwise doing well. No complaints        PAST MEDICAL/SURGICAL/FAMILY/SOCIAL HISTORY:    Past Medical, Past Surgical, and Family History:  PAST MEDICAL HISTORY:  Degeneration macular     Emphysema of lung controlled    Environmental allergies     Migraine headache     RA (rheumatoid arthritis).     PAST SURGICAL HISTORY:  H/O lumpectomy 2003    History of cataract surgery.     FAMILY HISTORY:  Family history of CVA, mother.     Social History:  · Social Concerns: None     Tobacco Usage:  · Tobacco Usage	Former smoker    ALLERGIES AND HOME MEDICATIONS:   Allergies:        Allergies:  	penicillin: Drug, Hives  	tramadol: Drug, Other, migraine            REVIEW OF SYSTEMS:    CONSTITUTIONAL: No weakness, fevers or chills  EYES/ENT: No visual changes;  No vertigo or throat pain   NECK: No pain or stiffness  RESPIRATORY: No cough, wheezing, hemoptysis; No shortness of breath  CARDIOVASCULAR: +cp  GASTROINTESTINAL: No abdominal or epigastric pain. No nausea, vomiting, or hematemesis; No diarrhea or constipation. No melena or hematochezia.  GENITOURINARY: No dysuria, frequency or hematuria  NEUROLOGICAL: No numbness or weakness  SKIN: No itching, burning, rashes, or lesions   All other review of systems is negative unless indicated above      ICU Vital Signs Last 24 Hrs  T(C): 36.4 (05 Mar 2023 09:12), Max: 36.6 (04 Mar 2023 14:59)  T(F): 97.6 (05 Mar 2023 09:12), Max: 97.9 (04 Mar 2023 14:59)  HR: 91 (05 Mar 2023 09:12) (77 - 91)  BP: 117/75 (05 Mar 2023 09:12) (101/56 - 141/71)  BP(mean): --  ABP: --  ABP(mean): --  RR: 18 (05 Mar 2023 09:12) (17 - 18)  SpO2: 98% (05 Mar 2023 09:12) (96% - 99%)    O2 Parameters below as of 05 Mar 2023 09:12  Patient On (Oxygen Delivery Method): nasal cannula  O2 Flow (L/min): 2                PHYSICAL EXAM:    Constitutional: NAD, awake and alert  HEENT: PERR, EOMI, Normal Hearing, MMM  Neck: Soft and supple, No LAD, No JVD  Respiratory: diminshed air entry to L upper lung field  Cardiovascular: S1 and S2, regular rate and rhythm, no Murmurs, gallops or rubs  Gastrointestinal: Bowel Sounds present, soft, nontender, nondistended, no guarding, no rebound  Extremities: No peripheral edema  Vascular: 2+ peripheral pulses  Neurological: A/O x 3, no focal deficits  Musculoskeletal: 5/5 strength b/l upper and lower extremities  Skin: No rashes      < from: CT Angio Chest PE Protocol w/ IV Cont (03.02.23 @ 14:11) >    IMPRESSION:.    No pulmonary embolism.    Moderate-sized left pneumothorax.    Multifocal airspace opacities, including dominant consolidation at the   left base. Findings may represent infection and recommend CT chest   follow-up in 3 months to ensure clearing.    < end of copied text >  CBC:            15.2   9.89  )-----------( 255      ( 03-02-23 @ 12:50 )             45.6         Chem:         ( 03-02-23 @ 12:50 )    137  |  106  |  11  ----------------------------<  106<H>  3.6   |  28  |  0.59        Liver Functions: ( 03-02-23 @ 12:50 )  Alb: 3.8 g/dL / Pro: 7.9 gm/dL / ALK PHOS: 73 U/L / ALT: 41 U/L / AST: 44 U/L / GGT: x              Type & Screen:         Assessment and Recommendation:   · Assessment	        #Moderate L PTX  #L pna will treat as CAP  #RA on chronic steroids  - Cont to cefepime and doxy D#4 for suspected GN aj organisms and coverage for other atypicals - repeat CXR shows clearing of LLL infiltrates  - Pigtail removal?..  - Reccomend to discharge on ceftin 500mg po bid for additional 5 days with pulmonary follow up  - Chest tube management per cardiothoracic to suction - repeat cxr shows near complete aeration of L lung  - Blood cultures: NGTD  - LABA/JOSHUA/LAMA  - Resume all other home meds ie MTX for known RA  - DVT px

## 2023-03-05 NOTE — PROGRESS NOTE ADULT - SUBJECTIVE AND OBJECTIVE BOX
Patient is a 83 y/o female who presents with a chief complaint of SOB, left PTX (04 Mar 2023 10:19)    BRIEF HOSPITAL COURSE: ***    Events last 24 hours: ***    PAST MEDICAL & SURGICAL HISTORY:  RA (rheumatoid arthritis)  Emphysema of lung  controlled  Degeneration macular  Environmental allergies  Migraine headache  H/O lumpectomy  2003  History of cataract surgery    Review of Systems:  CONSTITUTIONAL: No fever, chills, or fatigue.  EYES: No eye pain, visual disturbances, or discharge.  ENMT:  No difficulty hearing, tinnitus, or vertigo. No sinus or throat pain.  NECK: No pain or stiffness.  RESPIRATORY: No shortness of breath, cough, or wheezing.  CARDIOVASCULAR: No chest pain, palpitations, dizziness, or leg swelling.  GASTROINTESTINAL: No abdominal or epigastric pain. No nausea, vomiting, diarrhea, or constipation. No hematemesis, melena, or hematochezia.  GENITOURINARY: No dysuria, increased frequency, hematuria, or incontinence.  NEUROLOGICAL: No headaches, memory loss, loss of strength, numbness, or tremors.  SKIN: No itching, burning, rashes, or lesions.  MUSCULOSKELETAL: No joint pain or swelling. No muscle, back, or extremity pain.  PSYCHIATRIC: No depression, anxiety, mood swings, or difficulty sleeping.    Medications:  cefepime   IVPB      cefepime   IVPB 2000 milliGRAM(s) IV Intermittent every 12 hours  doxycycline IVPB 100 milliGRAM(s) IV Intermittent every 12 hours  doxycycline IVPB      albuterol    90 MICROgram(s) HFA Inhaler 2 Puff(s) Inhalation every 6 hours PRN  budesonide  80 MICROgram(s)/formoterol 4.5 MICROgram(s) Inhaler 2 Puff(s) Inhalation two times a day  acetaminophen     Tablet .. 650 milliGRAM(s) Oral every 6 hours PRN  oxyCODONE    IR 5 milliGRAM(s) Oral every 4 hours PRN  oxyCODONE    IR 10 milliGRAM(s) Oral every 4 hours PRN  enoxaparin Injectable 40 milliGRAM(s) SubCutaneous every 24 hours  predniSONE   Tablet 4 milliGRAM(s) Oral daily  sodium chloride 0.9% lock flush 3 milliLiter(s) IV Push every 8 hours  fluticasone propionate 50 MICROgram(s)/spray Nasal Spray 2 Spray(s) Both Nostrils every 12 hours  ipratropium 42 MICROgram(s) Nasal for Sublingual Use 2 Spray(s) SubLingual two times a day  lidocaine   4% Patch 1 Patch Transdermal daily  sodium chloride 0.65% Nasal 1 Spray(s) Both Nostrils two times a day PRN    ICU Vital Signs Last 24 Hrs  T(C): 36.6 (05 Mar 2023 05:28), Max: 36.6 (04 Mar 2023 14:59)  T(F): 97.9 (05 Mar 2023 05:28), Max: 97.9 (04 Mar 2023 14:59)  HR: 88 (05 Mar 2023 05:28) (77 - 89)  BP: 141/71 (05 Mar 2023 05:28) (101/56 - 141/71)  BP(mean): --  ABP: --  ABP(mean): --  RR: 18 (05 Mar 2023 05:28) (17 - 18)  SpO2: 96% (05 Mar 2023 05:28) (96% - 99%)    O2 Parameters below as of 05 Mar 2023 05:28  Patient On (Oxygen Delivery Method): nasal cannula    I&O's Detail    04 Mar 2023 07:01  -  05 Mar 2023 07:00  --------------------------------------------------------  IN:  Total IN: 0 mL    OUT:    Chest Tube (mL): 22 mL  Total OUT: 22 mL    Total NET: -22 mL    LABS:                        13.5   9.10  )-----------( 202      ( 03 Mar 2023 07:57 )             40.5     03-03    136  |  103  |  10  ----------------------------<  144<H>  4.1   |  28  |  0.61    Ca    9.1      03 Mar 2023 07:57    TPro  6.8  /  Alb  3.3  /  TBili  0.7  /  DBili  x   /  AST  28  /  ALT  36  /  AlkPhos  65  03-03    CAPILLARY BLOOD GLUCOSE    CULTURES:  Culture Results:   No growth to date. (03-02 @ 15:12)  Culture Results:   No growth to date. (03-02 @ 15:05)    Physical Examination:    General: Well appearing, lying in bed in NAD.      HEENT: Pupils equal, reactive to light. Symmetric. No scleral icterus or injection.    PULM: Clear to auscultation B/L. No wheezes, rales, or rhonchi apprecaited. No significant sputum production or increased respiratory effort.    NECK: Supple, no lymphadenopathy, trachea midline.    CVS: Regular rate and rhythm, no murmurs appreciated, +s1/s2.    ABD: Soft, nondistended, nontender, normoactive bowel sounds.    EXT: No edema, nontender.    SKIN: Warm and well perfused, no rashes noted.    NEURO: Alert, oriented, interactive, nonfocal.    RADIOLOGY:  < from: Xray Chest 1 View- PORTABLE-Routine (Xray Chest 1 View- PORTABLE-Routine in AM.) (03.04.23 @ 09:39) >  ACC: [id number] EXAM:  XR CHEST PORTABLE ROUTINE 1V   ORDERED BY: GIOVANNI KIMBROUGH     PROCEDURE DATE:  03/04/2023      INTERPRETATION:  Exam:XR CHEST    clinical history:Pneumothorax    Decreased or resolved left-sided pneumothorax. Left chest tube stable in   position. Stable bilateral interstitial lung disease    IMPRESSION: Decreased or resolved trace left apical pneumothorax    --- End of Report ---    TEMO JONES MD; Attending Radiologist  This document has been electronically signed. Mar  4 2023  1:15PM    < end of copied text >   Patient is a 83 y/o female who presents with a chief complaint of SOB, left PTX (04 Mar 2023 10:19)    Events last 24 hours: No acute overnight events. Chest tube on -20 suction. Drained 22cc overnight. Pt seen and examined at the bedside. No current complaints. Eager to be discharged.    PAST MEDICAL & SURGICAL HISTORY:  RA (rheumatoid arthritis)  Emphysema of lung  controlled  Degeneration macular  Environmental allergies  Migraine headache  H/O lumpectomy  2003  History of cataract surgery    Review of Systems:  CONSTITUTIONAL: No fever, chills, or fatigue.  EYES: No eye pain, visual disturbances, or discharge.  ENMT:  No difficulty hearing, tinnitus, or vertigo. No sinus or throat pain.  NECK: No pain or stiffness.  RESPIRATORY: No shortness of breath, cough, or wheezing.  CARDIOVASCULAR: No chest pain, palpitations, dizziness, or leg swelling.  GASTROINTESTINAL: No abdominal or epigastric pain. No nausea, vomiting, diarrhea, or constipation. No hematemesis, melena, or hematochezia.  GENITOURINARY: No dysuria, increased frequency, hematuria, or incontinence.  NEUROLOGICAL: No headaches, memory loss, loss of strength, numbness, or tremors.  SKIN: No itching, burning, rashes, or lesions.  MUSCULOSKELETAL: No joint pain or swelling. No muscle, back, or extremity pain.  PSYCHIATRIC: No depression, anxiety, mood swings, or difficulty sleeping.    Medications:  cefepime   IVPB      cefepime   IVPB 2000 milliGRAM(s) IV Intermittent every 12 hours  doxycycline IVPB 100 milliGRAM(s) IV Intermittent every 12 hours  doxycycline IVPB      albuterol    90 MICROgram(s) HFA Inhaler 2 Puff(s) Inhalation every 6 hours PRN  budesonide  80 MICROgram(s)/formoterol 4.5 MICROgram(s) Inhaler 2 Puff(s) Inhalation two times a day  acetaminophen     Tablet .. 650 milliGRAM(s) Oral every 6 hours PRN  oxyCODONE    IR 5 milliGRAM(s) Oral every 4 hours PRN  oxyCODONE    IR 10 milliGRAM(s) Oral every 4 hours PRN  enoxaparin Injectable 40 milliGRAM(s) SubCutaneous every 24 hours  predniSONE   Tablet 4 milliGRAM(s) Oral daily  sodium chloride 0.9% lock flush 3 milliLiter(s) IV Push every 8 hours  fluticasone propionate 50 MICROgram(s)/spray Nasal Spray 2 Spray(s) Both Nostrils every 12 hours  ipratropium 42 MICROgram(s) Nasal for Sublingual Use 2 Spray(s) SubLingual two times a day  lidocaine   4% Patch 1 Patch Transdermal daily  sodium chloride 0.65% Nasal 1 Spray(s) Both Nostrils two times a day PRN    ICU Vital Signs Last 24 Hrs  T(C): 36.6 (05 Mar 2023 05:28), Max: 36.6 (04 Mar 2023 14:59)  T(F): 97.9 (05 Mar 2023 05:28), Max: 97.9 (04 Mar 2023 14:59)  HR: 88 (05 Mar 2023 05:28) (77 - 89)  BP: 141/71 (05 Mar 2023 05:28) (101/56 - 141/71)  BP(mean): --  ABP: --  ABP(mean): --  RR: 18 (05 Mar 2023 05:28) (17 - 18)  SpO2: 96% (05 Mar 2023 05:28) (96% - 99%)    O2 Parameters below as of 05 Mar 2023 05:28  Patient On (Oxygen Delivery Method): nasal cannula    I&O's Detail    04 Mar 2023 07:01  -  05 Mar 2023 07:00  --------------------------------------------------------  IN:  Total IN: 0 mL    OUT:    Chest Tube (mL): 22 mL  Total OUT: 22 mL    Total NET: -22 mL    LABS:                        13.5   9.10  )-----------( 202      ( 03 Mar 2023 07:57 )             40.5     03-03    136  |  103  |  10  ----------------------------<  144<H>  4.1   |  28  |  0.61    Ca    9.1      03 Mar 2023 07:57    TPro  6.8  /  Alb  3.3  /  TBili  0.7  /  DBili  x   /  AST  28  /  ALT  36  /  AlkPhos  65  03-03    CAPILLARY BLOOD GLUCOSE    CULTURES:  Culture Results:   No growth to date. (03-02 @ 15:12)  Culture Results:   No growth to date. (03-02 @ 15:05)    Physical Examination:    General: In no acute distress.    HEENT: Pupils equal, reactive to light. Symmetric. No scleral icterus or injection.    PULM: Clear to auscultation b/l.    NECK: Supple, no lymphadenopathy, trachea midline.    CVS: Regular rate and rhythm, no murmurs appreciated, +s1/s2.    ABD: Soft, nondistended, nontender, normoactive bowel sounds.    EXT: No edema, nontender.    SKIN: Warm and well perfused, no rashes noted.    NEURO: Alert, oriented, interactive, nonfocal.    RADIOLOGY:  < from: Xray Chest 1 View- PORTABLE-Routine (Xray Chest 1 View- PORTABLE-Routine in AM.) (03.04.23 @ 09:39) >  ACC: [id number] EXAM:  XR CHEST PORTABLE ROUTINE 1V   ORDERED BY: GIOVANNI KIMBROUGH     PROCEDURE DATE:  03/04/2023      INTERPRETATION:  Exam:XR CHEST    clinical history:Pneumothorax    Decreased or resolved left-sided pneumothorax. Left chest tube stable in   position. Stable bilateral interstitial lung disease    IMPRESSION: Decreased or resolved trace left apical pneumothorax    --- End of Report ---    TEMO JONES MD; Attending Radiologist  This document has been electronically signed. Mar  4 2023  1:15PM    < end of copied text >

## 2023-03-06 ENCOUNTER — TRANSCRIPTION ENCOUNTER (OUTPATIENT)
Age: 83
End: 2023-03-06

## 2023-03-06 VITALS
TEMPERATURE: 98 F | RESPIRATION RATE: 18 BRPM | SYSTOLIC BLOOD PRESSURE: 128 MMHG | HEART RATE: 82 BPM | DIASTOLIC BLOOD PRESSURE: 56 MMHG | OXYGEN SATURATION: 100 %

## 2023-03-06 LAB — CYTOLOGY CVX/VAG DOC THIN PREP: ABNORMAL

## 2023-03-06 PROCEDURE — 71045 X-RAY EXAM CHEST 1 VIEW: CPT | Mod: 26,77

## 2023-03-06 PROCEDURE — 71045 X-RAY EXAM CHEST 1 VIEW: CPT | Mod: 26

## 2023-03-06 PROCEDURE — 99232 SBSQ HOSP IP/OBS MODERATE 35: CPT

## 2023-03-06 PROCEDURE — 99238 HOSP IP/OBS DSCHRG MGMT 30/<: CPT

## 2023-03-06 RX ORDER — CEFUROXIME AXETIL 250 MG
1 TABLET ORAL
Qty: 10 | Refills: 0
Start: 2023-03-06 | End: 2023-03-10

## 2023-03-06 RX ORDER — ACETAMINOPHEN 500 MG
2 TABLET ORAL
Qty: 0 | Refills: 0 | DISCHARGE
Start: 2023-03-06

## 2023-03-06 RX ADMIN — Medication 4 MILLIGRAM(S): at 11:00

## 2023-03-06 RX ADMIN — Medication 2 SPRAY(S): at 04:59

## 2023-03-06 RX ADMIN — CEFEPIME 100 MILLIGRAM(S): 1 INJECTION, POWDER, FOR SOLUTION INTRAMUSCULAR; INTRAVENOUS at 04:58

## 2023-03-06 RX ADMIN — BUDESONIDE AND FORMOTEROL FUMARATE DIHYDRATE 2 PUFF(S): 160; 4.5 AEROSOL RESPIRATORY (INHALATION) at 08:13

## 2023-03-06 RX ADMIN — Medication 650 MILLIGRAM(S): at 06:30

## 2023-03-06 RX ADMIN — Medication 110 MILLIGRAM(S): at 10:56

## 2023-03-06 RX ADMIN — Medication 650 MILLIGRAM(S): at 05:32

## 2023-03-06 RX ADMIN — SODIUM CHLORIDE 3 MILLILITER(S): 9 INJECTION INTRAMUSCULAR; INTRAVENOUS; SUBCUTANEOUS at 13:41

## 2023-03-06 RX ADMIN — Medication 650 MILLIGRAM(S): at 11:00

## 2023-03-06 RX ADMIN — Medication 650 MILLIGRAM(S): at 11:30

## 2023-03-06 RX ADMIN — SODIUM CHLORIDE 3 MILLILITER(S): 9 INJECTION INTRAMUSCULAR; INTRAVENOUS; SUBCUTANEOUS at 06:28

## 2023-03-06 NOTE — DISCHARGE NOTE PROVIDER - CARE PROVIDER_API CALL
Tre Chou)  Thoracic Surgery  [address] [place] [address]  Phone: [phone]  Fax: [phone]  Follow Up Time:

## 2023-03-06 NOTE — DISCHARGE NOTE NURSING/CASE MANAGEMENT/SOCIAL WORK - NSDCFUADDAPPT_GEN_ALL_CORE_FT
Please follow with your pulmonologist and PCP in 7-10 days   You may remove the dressing tomorrow and take a shower.  leave wound open to air     Leave dressing intact until tomorrow evening, reinforcing with tape if necessary (about 36 hours from chest tube removal). At that time you may remove the dressing and take a shower. Place clean gauze over wound if continual drainage. Call the office if you experience any fevers, shortness of breath, chest pain or excessive drainage from the incision, day or night. Go to the emergency room if any of these symptoms are severe. Take your medications as ordered and take a stool softener if needed with the narcotic medications.     Would Not recommend traveling in an airplane for at least 30 days .due to recent acute pneumothorax

## 2023-03-06 NOTE — PROGRESS NOTE ADULT - REASON FOR ADMISSION
SOB + Left PTX
SOB, left PTX
SOB + Left PTX

## 2023-03-06 NOTE — DISCHARGE NOTE PROVIDER - NSDCFUADDAPPT_GEN_ALL_CORE_FT
Please follow with your pulmonologist and PCP in 7-10 days   You may remove the dressing tomorrow and take a shower.  leave wound open to air     Leave dressing intact until tomorrow evening, reinforcing with tape if necessary (about 36 hours from chest tube removal). At that time you may remove the dressing and take a shower. Place clean gauze over wound if continual drainage. Call the office if you experience any fevers, shortness of breath, chest pain or excessive drainage from the incision, day or night. Go to the emergency room if any of these symptoms are severe. Take your medications as ordered and take a stool softener if needed with the narcotic medications.  Please follow with your pulmonologist and PCP in 7-10 days   You may remove the dressing tomorrow and take a shower.  leave wound open to air     Leave dressing intact until tomorrow evening, reinforcing with tape if necessary (about 36 hours from chest tube removal). At that time you may remove the dressing and take a shower. Place clean gauze over wound if continual drainage. Call the office if you experience any fevers, shortness of breath, chest pain or excessive drainage from the incision, day or night. Go to the emergency room if any of these symptoms are severe. Take your medications as ordered and take a stool softener if needed with the narcotic medications.     Would Not recommend traveling in an airplane for at least 30 days .due to recent acute pneumothorax

## 2023-03-06 NOTE — DISCHARGE NOTE PROVIDER - NSDCCPCAREPLAN_GEN_ALL_CORE_FT
PRINCIPAL DISCHARGE DIAGNOSIS  Diagnosis: Pneumothorax, left  Assessment and Plan of Treatment:       SECONDARY DISCHARGE DIAGNOSES  Diagnosis: Community acquired pneumonia  Assessment and Plan of Treatment:

## 2023-03-06 NOTE — DISCHARGE NOTE PROVIDER - NSDCPNSUBOBJ_GEN_ALL_CORE
Subjective: Pt in bed NAD no issues overnight     T(C): 36.6 (03-06-23 @ 09:44), Max: 36.9 (03-05-23 @ 20:04)  HR: 84 (03-06-23 @ 09:44) (84 - 94)  BP: 125/55 (03-06-23 @ 09:44) (109/58 - 126/54)  ABP: --  ABP(mean): --  RR: 18 (03-06-23 @ 09:44) (18 - 18)  SpO2: 100% (03-06-23 @ 09:44) (97% - 100%) RA   Wt(kg): --  CVP(mm Hg): --  CO: --  CI: --  PA: --                                              Tele: SR     CHEST TUBE:   10cc                            OUTPUT:     per 24 hours    AIR LEAKS:  [ ] YES [ x] NO                                   CAPILLARY BLOOD GLUCOSE               CXR:  < from: Xray Chest 1 View- PORTABLE-Urgent (Xray Chest 1 View- PORTABLE-Urgent .) (03.05.23 @ 17:35) >    IMPRESSION:  Left pleural pigtail catheter, not significantly changed in   position. No pneumothorax.    No significant change in bilateral patchy lung opacities and bilateral   trace pleural effusions.    < end of copied text >            Exam   Neuro:  Alert Awake NAD  Pulm: decreased at bases b/l   CV: RRR S1 S2   Abd: soft   Extremities: warm         Assessment:  82yFemale    with PAST MEDICAL & SURGICAL HISTORY:  RA (rheumatoid arthritis)      Emphysema of lung  controlled      Degeneration macular      Environmental allergies      Migraine headache      H/O lumpectomy  2003      History of cataract surgery        82 F with hpm copd not on home o2, former smoker, RA on actemra, migraines presented to [facility] with complaints of left sided chest pain worse when laying flat x 3 days found to have moderate Left PTX on CT scan. Pigtail Placed in ER with resolution of PTX        Plan   Pigtail d/c   Hospitalist consult for treatment of PNA   will d/c pt on Ceftin 500 BID x 5 days   Pt to follow up with her pulmonologist in 7-10 days   DW Dr Chou

## 2023-03-06 NOTE — PROGRESS NOTE ADULT - SUBJECTIVE AND OBJECTIVE BOX
82 F with hpm copd not on home o2, former smoker, RA on actemra, migraines presented to [facility] with complaints of left sided chest pain worse when laying flat x 3 days . States the pain radiated to left arm. Was advcised by pcp to come to ED. CTA demonstrated moderate sized left pneumothorax with lll consolidation. No trauma.  Pt otherwise hemodynamically stable except for some mild tachycardia. No PE. No hypoxia.  No leukocytosis (did not meet sepsis criteria on arrival). CTS notified and chest tube to be placed. Hospitalist consulted for medical management.   ED course: Rocephin/zithro.      sub: saw pt this am, pigtail still in place. no events        PAST MEDICAL/SURGICAL/FAMILY/SOCIAL HISTORY:    Past Medical, Past Surgical, and Family History:  PAST MEDICAL HISTORY:  Degeneration macular     Emphysema of lung controlled    Environmental allergies     Migraine headache     RA (rheumatoid arthritis).     PAST SURGICAL HISTORY:  H/O lumpectomy 2003    History of cataract surgery.     FAMILY HISTORY:  Family history of CVA, mother.     Social History:  · Social Concerns: None     Tobacco Usage:  · Tobacco Usage	Former smoker    ALLERGIES AND HOME MEDICATIONS:   Allergies:        Allergies:  	penicillin: Drug, Hives  	tramadol: Drug, Other, migraine            REVIEW OF SYSTEMS:    CONSTITUTIONAL: No weakness, fevers or chills  EYES/ENT: No visual changes;  No vertigo or throat pain   NECK: No pain or stiffness  RESPIRATORY: No cough, wheezing, hemoptysis; No shortness of breath  CARDIOVASCULAR: +cp  GASTROINTESTINAL: No abdominal or epigastric pain. No nausea, vomiting, or hematemesis; No diarrhea or constipation. No melena or hematochezia.  GENITOURINARY: No dysuria, frequency or hematuria  NEUROLOGICAL: No numbness or weakness  SKIN: No itching, burning, rashes, or lesions   All other review of systems is negative unless indicated above      ICU Vital Signs Last 24 Hrs  T(C): 36.6 (06 Mar 2023 09:44), Max: 36.9 (05 Mar 2023 20:04)  T(F): 97.9 (06 Mar 2023 09:44), Max: 98.4 (05 Mar 2023 20:04)  HR: 84 (06 Mar 2023 09:44) (84 - 94)  BP: 125/55 (06 Mar 2023 09:44) (109/58 - 126/54)  BP(mean): --  ABP: --  ABP(mean): --  RR: 18 (06 Mar 2023 09:44) (18 - 18)  SpO2: 100% (06 Mar 2023 09:44) (97% - 100%)    O2 Parameters below as of 06 Mar 2023 09:44  Patient On (Oxygen Delivery Method): nasal cannula                      PHYSICAL EXAM:    Constitutional: NAD, awake and alert  HEENT: PERR, EOMI, Normal Hearing, MMM  Neck: Soft and supple, No LAD, No JVD  Respiratory: diminshed air entry to L upper lung field  Cardiovascular: S1 and S2, regular rate and rhythm, no Murmurs, gallops or rubs  Gastrointestinal: Bowel Sounds present, soft, nontender, nondistended, no guarding, no rebound  Extremities: No peripheral edema  Vascular: 2+ peripheral pulses  Neurological: A/O x 3, no focal deficits  Musculoskeletal: 5/5 strength b/l upper and lower extremities  Skin: No rashes      < from: CT Angio Chest PE Protocol w/ IV Cont (03.02.23 @ 14:11) >    IMPRESSION:.    No pulmonary embolism.    Moderate-sized left pneumothorax.    Multifocal airspace opacities, including dominant consolidation at the   left base. Findings may represent infection and recommend CT chest   follow-up in 3 months to ensure clearing.    < end of copied text >  CBC:            15.2   9.89  )-----------( 255      ( 03-02-23 @ 12:50 )             45.6         Chem:         ( 03-02-23 @ 12:50 )    137  |  106  |  11  ----------------------------<  106<H>  3.6   |  28  |  0.59        Liver Functions: ( 03-02-23 @ 12:50 )  Alb: 3.8 g/dL / Pro: 7.9 gm/dL / ALK PHOS: 73 U/L / ALT: 41 U/L / AST: 44 U/L / GGT: x              Type & Screen:         Assessment and Recommendation:   · Assessment	        #Moderate L PTX  #L pna will treat as CAP  #RA on chronic steroids  - Cont to cefepime and doxy D#5 for suspected GN aj organisms and coverage for other atypicals - repeat CXR shows clearing of LLL infiltrates  - Pigtail removal?..  - Reccomend to discharge on ceftin 500mg po bid for additional 5 days with pulmonary follow up  - Chest tube management per cardiothoracic to suction - repeat cxr shows near complete aeration of L lung  - Blood cultures: NGTD  - LABA/JOSHUA/LAMA  - Resume all other home meds ie MTX for known RA  - DVT px  Discharge planning  Will follow intermittently

## 2023-03-06 NOTE — DISCHARGE NOTE PROVIDER - NSDCMRMEDTOKEN_GEN_ALL_CORE_FT
acetaminophen 325 mg oral tablet: 2 tab(s) orally every 6 hours, As needed, Mild Pain (1 - 3)  Actemra: intravenous once a month  ammonium chloride:   flunisolide 25 mcg/inh nasal spray: 2 puff(s) nasal 2 times a day  ipratropium 42 mcg/inh (0.06%) nasal spray: 2 spray(s) nasal 2 times a day  predniSONE 1 mg oral tablet: 4 tab(s) orally once a day   acetaminophen 325 mg oral tablet: 2 tab(s) orally every 6 hours, As needed, Mild Pain (1 - 3)  Actemra: intravenous once a month  ammonium chloride:   cefuroxime 500 mg oral tablet: 1 tab(s) orally 2 times a day   flunisolide 25 mcg/inh nasal spray: 2 puff(s) nasal 2 times a day  ipratropium 42 mcg/inh (0.06%) nasal spray: 2 spray(s) nasal 2 times a day  predniSONE 1 mg oral tablet: 4 tab(s) orally once a day

## 2023-03-06 NOTE — DISCHARGE NOTE NURSING/CASE MANAGEMENT/SOCIAL WORK - PATIENT PORTAL LINK FT
You can access the FollowMyHealth Patient Portal offered by [facility] by registering at the following website: [email or web address]. By joining [facility]’s FollowMyHealth portal, you will also be able to view your health information using other applications (apps) compatible with our system.

## 2023-03-06 NOTE — DISCHARGE NOTE PROVIDER - HOSPITAL COURSE
82 F with hpm copd not on home o2, former smoker, RA on actemra, migraines presented to [facility] with complaints of left sided chest pain worse when laying flat x 3 days found to have moderate Left PTX on CT scan. Pigtail Placed in ER with resolution of PTX

## 2023-03-07 LAB
CULTURE RESULTS: SIGNIFICANT CHANGE UP
CULTURE RESULTS: SIGNIFICANT CHANGE UP
SPECIMEN SOURCE: SIGNIFICANT CHANGE UP
SPECIMEN SOURCE: SIGNIFICANT CHANGE UP

## 2023-03-10 DIAGNOSIS — J93.9 PNEUMOTHORAX, UNSPECIFIED: ICD-10-CM

## 2023-03-10 DIAGNOSIS — Z87.891 PERSONAL HISTORY OF NICOTINE DEPENDENCE: ICD-10-CM

## 2023-03-10 DIAGNOSIS — Z20.822 CONTACT WITH AND (SUSPECTED) EXPOSURE TO COVID-19: ICD-10-CM

## 2023-03-10 DIAGNOSIS — Z79.52 LONG TERM (CURRENT) USE OF SYSTEMIC STEROIDS: ICD-10-CM

## 2023-03-10 DIAGNOSIS — M06.00 RHEUMATOID ARTHRITIS WITHOUT RHEUMATOID FACTOR, UNSPECIFIED SITE: ICD-10-CM

## 2023-03-10 DIAGNOSIS — J18.9 PNEUMONIA, UNSPECIFIED ORGANISM: ICD-10-CM

## 2023-03-10 DIAGNOSIS — J43.9 EMPHYSEMA, UNSPECIFIED: ICD-10-CM

## 2023-03-10 DIAGNOSIS — Z88.0 ALLERGY STATUS TO PENICILLIN: ICD-10-CM

## 2023-03-10 DIAGNOSIS — Z88.8 ALLERGY STATUS TO OTHER DRUGS, MEDICAMENTS AND BIOLOGICAL SUBSTANCES: ICD-10-CM

## 2023-03-20 ENCOUNTER — APPOINTMENT (OUTPATIENT)
Dept: DERMATOLOGY | Facility: CLINIC | Age: 83
End: 2023-03-20
Payer: MEDICARE

## 2023-03-20 VITALS — HEIGHT: 63 IN | WEIGHT: 108 LBS | BODY MASS INDEX: 19.14 KG/M2

## 2023-03-20 DIAGNOSIS — L30.9 DERMATITIS, UNSPECIFIED: ICD-10-CM

## 2023-03-20 DIAGNOSIS — L81.4 OTHER MELANIN HYPERPIGMENTATION: ICD-10-CM

## 2023-03-20 DIAGNOSIS — L82.1 OTHER SEBORRHEIC KERATOSIS: ICD-10-CM

## 2023-03-20 DIAGNOSIS — D22.9 MELANOCYTIC NEVI, UNSPECIFIED: ICD-10-CM

## 2023-03-20 PROCEDURE — 99213 OFFICE O/P EST LOW 20 MIN: CPT

## 2023-03-20 RX ORDER — CEFUROXIME AXETIL 500 MG/1
500 TABLET ORAL
Qty: 10 | Refills: 0 | Status: DISCONTINUED | COMMUNITY
Start: 2023-03-06

## 2023-03-20 RX ORDER — PREDNISONE 1 MG/1
1 TABLET ORAL
Qty: 360 | Refills: 0 | Status: ACTIVE | COMMUNITY
Start: 2023-02-14

## 2023-03-20 NOTE — PHYSICAL EXAM
[Alert] : alert [Oriented x 3] : ~L oriented x 3 [Well Nourished] : well nourished [Full Body Skin Exam Performed] : performed [FreeTextEntry3] : A full skin exam was performed including the scalp, face, neck, chest, abdomen, back, buttocks, upper extremities and lower extremities.  The patient declined examination of the breasts and genitalia.  \par The exam did show the following benign growths:\par [place] pigmented nevi.\par Seborrheic keratoses.\par Lentigines.\par \par Xerosis, marked on the bilateral palms.

## 2023-03-20 NOTE — ASSESSMENT
[FreeTextEntry1] : A complete skin examination was performed.  There is no evidence of skin cancer.  We discussed the importance of photoprotection, including the use of hats, protective clothing and sunscreens with an SPF of at least 30.  Sun avoidance was also discussed.  The ABCDE's of melanoma was discussed.  Regular skin exams recommended.\par \par Hand eczema / xerosis\par Emollients discussed at length with patient.  \par Recommend Neutrogena Norwegian Formula vs. Aveeno lotion throughout the days, and Theraplex emollients at bedtime.\par The importance of applying a moisturizer throughout the day, including after washing her hands, was discussed.\par

## 2023-03-22 ENCOUNTER — NON-APPOINTMENT (OUTPATIENT)
Age: 83
End: 2023-03-22

## 2023-03-23 ENCOUNTER — APPOINTMENT (OUTPATIENT)
Dept: THORACIC SURGERY | Facility: CLINIC | Age: 83
End: 2023-03-23
Payer: MEDICARE

## 2023-03-23 VITALS
SYSTOLIC BLOOD PRESSURE: 117 MMHG | HEIGHT: 63 IN | DIASTOLIC BLOOD PRESSURE: 74 MMHG | OXYGEN SATURATION: 95 % | HEART RATE: 103 BPM | BODY MASS INDEX: 19.14 KG/M2 | WEIGHT: 108 LBS | RESPIRATION RATE: 16 BRPM

## 2023-03-23 DIAGNOSIS — J93.83 OTHER PNEUMOTHORAX: ICD-10-CM

## 2023-03-23 PROCEDURE — 99213 OFFICE O/P EST LOW 20 MIN: CPT

## 2023-03-23 RX ORDER — FLUCONAZOLE 150 MG/1
150 TABLET ORAL
Qty: 2 | Refills: 0 | Status: COMPLETED | COMMUNITY
Start: 2022-02-02 | End: 2023-03-23

## 2023-03-29 NOTE — HISTORY OF PRESENT ILLNESS
[FreeTextEntry1] : Ms. MAURA BARKER, 82 year old female, former smoker, w/ hx of  COPD, RA on Actemra, migraines ....., who presents today for initial evaluation of pneumothorax. Recently admitted to [facility] on 3/2/23-3/6/23 with Left pneumothorax s/p pigtail with resolution. \par \par She presents to the office today to discuss recent hospitalization and follow up care. She is overall improving and has no residual shortness of breath. \par  \par

## 2023-03-29 NOTE — DATA REVIEWED
[FreeTextEntry1] : EXAM: CT ANGIO CHEST PULM ART WAWIJAGJIT \par \par PROCEDURE DATE: 03/02/2023\par \par INTERPRETATION: HISTORY: Chest pain. Cough.\par \par EXAMINATION: CTA CHEST was performed for evaluation of the pulmonary arteries. Multiplanar reformatted images and MIPS were acquired.\par CONTRAST/COMPLICATIONS:\par IV Contrast: Omnipaque 350 57 cc administered 43 cc discarded\par Oral Contrast: NONE\par Complications: None reported at time of study completion\par \par COMPARISON: No prior CT chest comparison available.\par \par FINDINGS:\par \par PULMONARY ARTERIES: No pulmonary embolism.\par \par MEDIASTINUM: Normal heart size. No pericardial effusion. Thoracic aorta normal caliber. No large mediastinal lymph nodes.\par \par AIRWAYS, LUNGS, PLEURA: Central airways clear. Emphysema. Subpleural cystic opacities at the right base. Left basilar consolidation. Clustered and branching nodular opacities in the right lower lobe. Patchy opacities at the right apex.\par \par No pleural effusion. Moderate left pneumothorax.\par \par IMAGED ABDOMEN: 1.6 cm hepatic hypodensity, likely cysts.\par \par SOFT TISSUES: Unremarkable.\par \par BONES: Age indeterminate L1 loss of height.\par \par \par IMPRESSION:.\par \par No pulmonary embolism.\par \par Moderate-sized left pneumothorax.\par \par Multifocal airspace opacities, including dominant consolidation at the left base. Findings may represent infection and recommend CT chest follow-up in 3 months to ensure clearing.\par \par Findings discussed with BEAR Macedo on 3/2/2023 at 2:26 PM.\par \par --- End of Report ---\par \par CODIE ROSA MD; Attending Radiologist\par This document has been electronically signed. Mar 2 2023 2:27PM

## 2023-03-29 NOTE — REVIEW OF SYSTEMS
[Negative] : ENT [Feeling Poorly] : not feeling poorly [Feeling Tired] : not feeling tired [Chest Pain] : no chest pain [Palpitations] : no palpitations

## 2023-03-29 NOTE — ASSESSMENT
[FreeTextEntry1] : Ms Lowe presents for follow up after spontaneous pneumothorax. This was a first time occurrence. She has ILD and emphysema which is very apparent on CT imaging review. Surgical intervention is not currently warranted and given her inherent lung disease there is a risk of reoccurrence. At this time clinical surveillance is recommended. Should this reoccur intervention will take place. Currently the recommendation is continue observation.\par \par PLAN:\par - Return to care as needed\par \par \par \par \par \par I, Dr. Chou, personally performed the evaluation and management (E/M) services for this patient.  That E/M includes conducting the initial examination, assessing all conditions, and establishing the plan of care.  Today, my ACP, Vinicio Valiente NP was here to observe my evaluation and management services for this patient to be followed going forward.\par \par \par

## 2023-07-18 ENCOUNTER — RESULT REVIEW (OUTPATIENT)
Age: 83
End: 2023-07-18

## 2023-07-18 ENCOUNTER — APPOINTMENT (OUTPATIENT)
Dept: RADIOLOGY | Facility: CLINIC | Age: 83
End: 2023-07-18
Payer: MEDICARE

## 2023-07-18 ENCOUNTER — APPOINTMENT (OUTPATIENT)
Dept: MAMMOGRAPHY | Facility: CLINIC | Age: 83
End: 2023-07-18
Payer: MEDICARE

## 2023-07-18 ENCOUNTER — OUTPATIENT (OUTPATIENT)
Dept: OUTPATIENT SERVICES | Facility: HOSPITAL | Age: 83
LOS: 1 days | End: 2023-07-18
Payer: MEDICARE

## 2023-07-18 DIAGNOSIS — Z98.49 CATARACT EXTRACTION STATUS, UNSPECIFIED EYE: Chronic | ICD-10-CM

## 2023-07-18 DIAGNOSIS — Z98.890 OTHER SPECIFIED POSTPROCEDURAL STATES: Chronic | ICD-10-CM

## 2023-07-18 DIAGNOSIS — Z01.419 ENCOUNTER FOR GYNECOLOGICAL EXAMINATION (GENERAL) (ROUTINE) WITHOUT ABNORMAL FINDINGS: ICD-10-CM

## 2023-07-18 PROCEDURE — 77080 DXA BONE DENSITY AXIAL: CPT | Mod: 26

## 2023-07-18 PROCEDURE — 77063 BREAST TOMOSYNTHESIS BI: CPT | Mod: 26

## 2023-07-18 PROCEDURE — 77067 SCR MAMMO BI INCL CAD: CPT | Mod: 26

## 2023-07-18 PROCEDURE — 77080 DXA BONE DENSITY AXIAL: CPT

## 2023-07-18 PROCEDURE — 77063 BREAST TOMOSYNTHESIS BI: CPT

## 2023-07-18 PROCEDURE — 77067 SCR MAMMO BI INCL CAD: CPT

## 2023-08-08 ENCOUNTER — RX RENEWAL (OUTPATIENT)
Age: 83
End: 2023-08-08

## 2023-08-16 NOTE — CONSULT NOTE ADULT - CONSULT REQUESTED BY NAME
CTS

## 2023-08-22 RX ORDER — HYDROCORTISONE 1 %
12 CREAM (GRAM) TOPICAL
Qty: 400 | Refills: 3 | Status: ACTIVE | COMMUNITY
Start: 2016-12-16 | End: 1900-01-01

## 2023-08-23 ENCOUNTER — APPOINTMENT (OUTPATIENT)
Dept: OBGYN | Facility: CLINIC | Age: 83
End: 2023-08-23
Payer: MEDICARE

## 2023-08-23 DIAGNOSIS — M81.0 AGE-RELATED OSTEOPOROSIS W/OUT CURRENT PATHOLOGICAL FRACTURE: ICD-10-CM

## 2023-08-23 PROCEDURE — 99212 OFFICE O/P EST SF 10 MIN: CPT | Mod: 95

## 2024-02-14 ENCOUNTER — APPOINTMENT (OUTPATIENT)
Dept: GASTROENTEROLOGY | Facility: CLINIC | Age: 84
End: 2024-02-14
Payer: MEDICARE

## 2024-02-14 VITALS
DIASTOLIC BLOOD PRESSURE: 70 MMHG | SYSTOLIC BLOOD PRESSURE: 98 MMHG | BODY MASS INDEX: 18.78 KG/M2 | HEIGHT: 63 IN | WEIGHT: 106 LBS

## 2024-02-14 DIAGNOSIS — K21.9 GASTRO-ESOPHAGEAL REFLUX DISEASE W/OUT ESOPHAGITIS: ICD-10-CM

## 2024-02-14 PROCEDURE — 99213 OFFICE O/P EST LOW 20 MIN: CPT

## 2024-02-14 NOTE — PHYSICAL EXAM
[Alert] : alert [Normal Voice/Communication] : normal voice/communication [Healthy Appearing] : healthy appearing [No Acute Distress] : no acute distress [Sclera] : the sclera and conjunctiva were normal [Hearing Threshold Finger Rub Not [place]] : hearing was normal [Normal Lips/Gums] : the lips and gums were normal [Oropharynx] : the oropharynx was normal [Normal Appearance] : the appearance of the neck was normal [No Neck Mass] : no neck mass was observed [No Respiratory Distress] : no respiratory distress [No Acc Muscle Use] : no accessory muscle use [Respiration, Rhythm And Depth] : normal respiratory rhythm and effort [Auscultation Breath Sounds / Voice Sounds] : lungs were clear to auscultation bilaterally [Heart Rate And Rhythm] : heart rate was normal and rhythm regular [Normal S1, S2] : normal S1 and S2 [Murmurs] : no murmurs [Bowel Sounds] : normal bowel sounds [Abdomen Tenderness] : non-tender [No Masses] : no abdominal mass palpated [Abdomen Soft] : soft [] : no hepatosplenomegaly [Oriented To Time, Place, And Person] : oriented to person, place, and time

## 2024-02-14 NOTE — ASSESSMENT
[FreeTextEntry1] : 84 yo female with history of improved symptoms of LPR.  Patient advised about lifestyle modifications and to continue H2 blockers.  Patient to call with any concerns.

## 2024-02-14 NOTE — HISTORY OF PRESENT ILLNESS
[FreeTextEntry1] : Ms. MAURA BARKER is a 83 year old female with history of chronic coughing and a postnasal drip.  Patient was evaluated by ENT and was told of LPR.  Patient was given a diet and started on daily Pepcid.  Her coughing has resolved.  She has no true complaints of heartburn or dysphagia.  Endoscopy done in 2022 demonstrated a hiatal hernia but no esophagitis.  Patient did have brief episode of hyponatremia related to diet.

## 2024-02-26 ENCOUNTER — APPOINTMENT (OUTPATIENT)
Dept: OBGYN | Facility: CLINIC | Age: 84
End: 2024-02-26
Payer: MEDICARE

## 2024-02-26 VITALS
HEIGHT: 63 IN | DIASTOLIC BLOOD PRESSURE: 60 MMHG | WEIGHT: 103 LBS | RESPIRATION RATE: 14 BRPM | SYSTOLIC BLOOD PRESSURE: 130 MMHG | HEART RATE: 75 BPM | BODY MASS INDEX: 18.25 KG/M2

## 2024-02-26 DIAGNOSIS — Z01.419 ENCOUNTER FOR GYNECOLOGICAL EXAMINATION (GENERAL) (ROUTINE) W/OUT ABNORMAL FINDINGS: ICD-10-CM

## 2024-02-26 LAB
CARD LOT #: NORMAL
CARD LOT EXP DATE: NORMAL
DATE COLLECTED: NORMAL
DATE COLLECTED: NORMAL
DEVELOPER LOT #: NORMAL
DEVELOPER LOT EXP DATE: NORMAL
HEMOCCULT 2: NEGATIVE
HEMOCCULT SP1 STL QL: NEGATIVE
QUALITY CONTROL: YES
QUALITY CONTROL: YES

## 2024-02-26 PROCEDURE — G0101: CPT

## 2024-02-26 NOTE — PLAN
[FreeTextEntry1] : 84 YO FEMALE PRESENTS FOR ANNUAL GYN EXAMINATION. SELF BREAST EXAMINATION TAUGHT. MAMMOGRAM ORDERED. EXERCISE, CALCIUM AND VITAMIN D3 SUPPLEMENTATION DISCUSSED. BONE DENSITY STUDY AND INDICATIONS REVIEWED. COLONOSCOPY HISTORY REVIEWED AND DISCUSSED FOLLOWUP.

## 2024-02-26 NOTE — COUNSELING
[Breast Self Exam] : breast self exam [Nutrition/ Exercise/ Weight Management] : nutrition, exercise, weight management [Bladder Hygiene] : bladder hygiene [Vaccines] : vaccines [Influenza Vaccine] : influenza vaccine

## 2024-02-26 NOTE — PHYSICAL EXAM
[Appropriately responsive] : appropriately responsive [Alert] : alert [No Acute Distress] : no acute distress [No Lymphadenopathy] : no lymphadenopathy [Soft] : soft [Non-distended] : non-distended [Non-tender] : non-tender [No HSM] : No HSM [No Lesions] : no lesions [No Mass] : no mass [Oriented x3] : oriented x3 [Examination Of The Breasts] : a normal appearance [No Masses] : no breast masses were palpable [Labia Majora] : normal [Labia Minora] : normal [Uterine Adnexae] : normal [Declined] : Patient declined rectal exam [Normal rectal exam] : was normal [Normal Brown Stool] : was normal and brown [Occult Blood Positive] : was negative for occult blood analysis [External Hemorrhoid] : no external hemorrhoids were present [Internal Hemorrhoid] : no internal hemorrhoids were present [Normal] : was normal [Skin Tags] : no residual hemorrhoidal skin tags [None] : there was no rectal mass  [FreeTextEntry4] : NO CYSTOCELE

## 2024-02-28 LAB — CYTOLOGY CVX/VAG DOC THIN PREP: ABNORMAL

## 2024-03-17 ENCOUNTER — EMERGENCY (EMERGENCY)
Facility: HOSPITAL | Age: 84
LOS: 0 days | Discharge: LEFT BEFORE DISCHARGE | End: 2024-03-17
Attending: EMERGENCY MEDICINE
Payer: MEDICARE

## 2024-03-17 ENCOUNTER — NON-APPOINTMENT (OUTPATIENT)
Age: 84
End: 2024-03-17

## 2024-03-17 VITALS
SYSTOLIC BLOOD PRESSURE: 113 MMHG | TEMPERATURE: 99 F | HEART RATE: 90 BPM | DIASTOLIC BLOOD PRESSURE: 81 MMHG | OXYGEN SATURATION: 99 % | RESPIRATION RATE: 18 BRPM

## 2024-03-17 VITALS — HEIGHT: 64 IN | WEIGHT: 104.94 LBS

## 2024-03-17 DIAGNOSIS — H35.30 UNSPECIFIED MACULAR DEGENERATION: ICD-10-CM

## 2024-03-17 DIAGNOSIS — Z98.890 OTHER SPECIFIED POSTPROCEDURAL STATES: Chronic | ICD-10-CM

## 2024-03-17 DIAGNOSIS — Z86.69 PERSONAL HISTORY OF OTHER DISEASES OF THE NERVOUS SYSTEM AND SENSE ORGANS: ICD-10-CM

## 2024-03-17 DIAGNOSIS — Z53.29 PROCEDURE AND TREATMENT NOT CARRIED OUT BECAUSE OF PATIENT'S DECISION FOR OTHER REASONS: ICD-10-CM

## 2024-03-17 DIAGNOSIS — M06.9 RHEUMATOID ARTHRITIS, UNSPECIFIED: ICD-10-CM

## 2024-03-17 DIAGNOSIS — R29.700 NIHSS SCORE 0: ICD-10-CM

## 2024-03-17 DIAGNOSIS — R47.01 APHASIA: ICD-10-CM

## 2024-03-17 DIAGNOSIS — Z88.0 ALLERGY STATUS TO PENICILLIN: ICD-10-CM

## 2024-03-17 DIAGNOSIS — Z98.49 CATARACT EXTRACTION STATUS, UNSPECIFIED EYE: Chronic | ICD-10-CM

## 2024-03-17 DIAGNOSIS — Z88.5 ALLERGY STATUS TO NARCOTIC AGENT: ICD-10-CM

## 2024-03-17 DIAGNOSIS — Z82.3 FAMILY HISTORY OF STROKE: ICD-10-CM

## 2024-03-17 DIAGNOSIS — R29.810 FACIAL WEAKNESS: ICD-10-CM

## 2024-03-17 DIAGNOSIS — G45.9 TRANSIENT CEREBRAL ISCHEMIC ATTACK, UNSPECIFIED: ICD-10-CM

## 2024-03-17 DIAGNOSIS — I72.9 ANEURYSM OF UNSPECIFIED SITE: ICD-10-CM

## 2024-03-17 LAB
ALBUMIN SERPL ELPH-MCNC: 3.2 G/DL — LOW (ref 3.3–5)
ALP SERPL-CCNC: 87 U/L — SIGNIFICANT CHANGE UP (ref 40–120)
ALT FLD-CCNC: 30 U/L — SIGNIFICANT CHANGE UP (ref 12–78)
ANION GAP SERPL CALC-SCNC: 9 MMOL/L — SIGNIFICANT CHANGE UP (ref 5–17)
APTT BLD: 28.2 SEC — SIGNIFICANT CHANGE UP (ref 24.5–35.6)
AST SERPL-CCNC: 33 U/L — SIGNIFICANT CHANGE UP (ref 15–37)
BASOPHILS # BLD AUTO: 0.09 K/UL — SIGNIFICANT CHANGE UP (ref 0–0.2)
BASOPHILS NFR BLD AUTO: 1.2 % — SIGNIFICANT CHANGE UP (ref 0–2)
BILIRUB SERPL-MCNC: 0.2 MG/DL — SIGNIFICANT CHANGE UP (ref 0.2–1.2)
BLD GP AB SCN SERPL QL: SIGNIFICANT CHANGE UP
BUN SERPL-MCNC: 15 MG/DL — SIGNIFICANT CHANGE UP (ref 7–23)
CALCIUM SERPL-MCNC: 9 MG/DL — SIGNIFICANT CHANGE UP (ref 8.5–10.1)
CHLORIDE SERPL-SCNC: 102 MMOL/L — SIGNIFICANT CHANGE UP (ref 96–108)
CO2 SERPL-SCNC: 28 MMOL/L — SIGNIFICANT CHANGE UP (ref 22–31)
CREAT SERPL-MCNC: 0.65 MG/DL — SIGNIFICANT CHANGE UP (ref 0.5–1.3)
EGFR: 88 ML/MIN/1.73M2 — SIGNIFICANT CHANGE UP
EOSINOPHIL # BLD AUTO: 0.17 K/UL — SIGNIFICANT CHANGE UP (ref 0–0.5)
EOSINOPHIL NFR BLD AUTO: 2.3 % — SIGNIFICANT CHANGE UP (ref 0–6)
GLUCOSE SERPL-MCNC: 160 MG/DL — HIGH (ref 70–99)
HCT VFR BLD CALC: 38.9 % — SIGNIFICANT CHANGE UP (ref 34.5–45)
HGB BLD-MCNC: 13.1 G/DL — SIGNIFICANT CHANGE UP (ref 11.5–15.5)
IMM GRANULOCYTES NFR BLD AUTO: 0.1 % — SIGNIFICANT CHANGE UP (ref 0–0.9)
INR BLD: 0.95 RATIO — SIGNIFICANT CHANGE UP (ref 0.85–1.18)
LYMPHOCYTES # BLD AUTO: 2.75 K/UL — SIGNIFICANT CHANGE UP (ref 1–3.3)
LYMPHOCYTES # BLD AUTO: 37.4 % — SIGNIFICANT CHANGE UP (ref 13–44)
MAGNESIUM SERPL-MCNC: 2.2 MG/DL — SIGNIFICANT CHANGE UP (ref 1.6–2.6)
MCHC RBC-ENTMCNC: 31.9 PG — SIGNIFICANT CHANGE UP (ref 27–34)
MCHC RBC-ENTMCNC: 33.7 GM/DL — SIGNIFICANT CHANGE UP (ref 32–36)
MCV RBC AUTO: 94.6 FL — SIGNIFICANT CHANGE UP (ref 80–100)
MONOCYTES # BLD AUTO: 1.01 K/UL — HIGH (ref 0–0.9)
MONOCYTES NFR BLD AUTO: 13.7 % — SIGNIFICANT CHANGE UP (ref 2–14)
NEUTROPHILS # BLD AUTO: 3.33 K/UL — SIGNIFICANT CHANGE UP (ref 1.8–7.4)
NEUTROPHILS NFR BLD AUTO: 45.3 % — SIGNIFICANT CHANGE UP (ref 43–77)
PLATELET # BLD AUTO: 293 K/UL — SIGNIFICANT CHANGE UP (ref 150–400)
POTASSIUM SERPL-MCNC: 3.8 MMOL/L — SIGNIFICANT CHANGE UP (ref 3.5–5.3)
POTASSIUM SERPL-SCNC: 3.8 MMOL/L — SIGNIFICANT CHANGE UP (ref 3.5–5.3)
PROT SERPL-MCNC: 6.9 GM/DL — SIGNIFICANT CHANGE UP (ref 6–8.3)
PROTHROM AB SERPL-ACNC: 10.8 SEC — SIGNIFICANT CHANGE UP (ref 9.5–13)
RBC # BLD: 4.11 M/UL — SIGNIFICANT CHANGE UP (ref 3.8–5.2)
RBC # FLD: 13.5 % — SIGNIFICANT CHANGE UP (ref 10.3–14.5)
SODIUM SERPL-SCNC: 139 MMOL/L — SIGNIFICANT CHANGE UP (ref 135–145)
TROPONIN I, HIGH SENSITIVITY RESULT: 35.45 NG/L — SIGNIFICANT CHANGE UP
WBC # BLD: 7.36 K/UL — SIGNIFICANT CHANGE UP (ref 3.8–10.5)
WBC # FLD AUTO: 7.36 K/UL — SIGNIFICANT CHANGE UP (ref 3.8–10.5)

## 2024-03-17 PROCEDURE — 36415 COLL VENOUS BLD VENIPUNCTURE: CPT

## 2024-03-17 PROCEDURE — 71045 X-RAY EXAM CHEST 1 VIEW: CPT | Mod: 26

## 2024-03-17 PROCEDURE — 80053 COMPREHEN METABOLIC PANEL: CPT

## 2024-03-17 PROCEDURE — 71045 X-RAY EXAM CHEST 1 VIEW: CPT

## 2024-03-17 PROCEDURE — 85610 PROTHROMBIN TIME: CPT

## 2024-03-17 PROCEDURE — 86901 BLOOD TYPING SEROLOGIC RH(D): CPT

## 2024-03-17 PROCEDURE — 86900 BLOOD TYPING SEROLOGIC ABO: CPT

## 2024-03-17 PROCEDURE — 99285 EMERGENCY DEPT VISIT HI MDM: CPT | Mod: 25

## 2024-03-17 PROCEDURE — 70496 CT ANGIOGRAPHY HEAD: CPT | Mod: 26,MC

## 2024-03-17 PROCEDURE — 70496 CT ANGIOGRAPHY HEAD: CPT | Mod: MC

## 2024-03-17 PROCEDURE — 84484 ASSAY OF TROPONIN QUANT: CPT

## 2024-03-17 PROCEDURE — 85730 THROMBOPLASTIN TIME PARTIAL: CPT

## 2024-03-17 PROCEDURE — 93005 ELECTROCARDIOGRAM TRACING: CPT

## 2024-03-17 PROCEDURE — 70498 CT ANGIOGRAPHY NECK: CPT | Mod: 26,MC

## 2024-03-17 PROCEDURE — 83735 ASSAY OF MAGNESIUM: CPT

## 2024-03-17 PROCEDURE — 99285 EMERGENCY DEPT VISIT HI MDM: CPT

## 2024-03-17 PROCEDURE — 93010 ELECTROCARDIOGRAM REPORT: CPT

## 2024-03-17 PROCEDURE — 82962 GLUCOSE BLOOD TEST: CPT

## 2024-03-17 PROCEDURE — 70498 CT ANGIOGRAPHY NECK: CPT | Mod: MC

## 2024-03-17 PROCEDURE — 85025 COMPLETE CBC W/AUTO DIFF WBC: CPT

## 2024-03-17 PROCEDURE — 86850 RBC ANTIBODY SCREEN: CPT

## 2024-03-17 RX ORDER — ASPIRIN/CALCIUM CARB/MAGNESIUM 324 MG
325 TABLET ORAL ONCE
Refills: 0 | Status: COMPLETED | OUTPATIENT
Start: 2024-03-17 | End: 2024-03-17

## 2024-03-17 RX ADMIN — Medication 325 MILLIGRAM(S): at 03:32

## 2024-03-17 NOTE — ED PROVIDER NOTE - CARE PROVIDER_API CALL
Callum Orellana  Neurology  [address], [address]  [place] [address]  Phone: [phone]  Fax: [phone]  Follow Up Time: 1-3 Days    Devante Abraham  Vascular Neurology  [address] [place] [id number]  Phone: [phone]  Fax: [phone]  Follow Up Time: 7-10 Days

## 2024-03-17 NOTE — ED PROVIDER NOTE - PATIENT PORTAL LINK FT
You can access the FollowMyHealth Patient Portal offered by [facility] by registering at the following website: [email or web address]. By joining [employer]’s FollowMyHealth portal, you will also be able to view your health information using other applications (apps) compatible with our system.

## 2024-03-17 NOTE — ED PROVIDER NOTE - NSPTACCESSSVCSAPPTDETAILS_ED_ALL_ED_FT
urgent for TIA workup.  Pt was advised to be admitted but signed out AMA.  She needs prompt follow up

## 2024-03-17 NOTE — ED PROVIDER NOTE - CLINICAL SUMMARY MEDICAL DECISION MAKING FREE TEXT BOX
83-year-old female with history of macular degeneration, migraine headaches, rheumatoid arthritis brought in by family via POV for evaluation of approximately 1 minute and 20 seconds of expressive aphasia and facial drooping noted by her daughter while they were eating dinner at approximately 8:30 PM last night.  The patient's daughter states that while they are eating, the patient stopped responding to her and seemed like she was attempting to talk but could not get any words out.  Also it look like she had minor facial droop.  This completely resolved and she was alert and oriented on arrival to the emergency department.  The patient notes that she had been seeing floaters for an extended period of time and followed up with her retinal specialist who stated that her exam was normal.  Currently the patient does not have any complaints.  The patient is not on any anticoagulation.  At triage, NIH stroke scale 0.   patient's history and physical is most consistent with a TIA.  Will get workup to include cardiac labs, EKG, CTA of the head and CTA of the neck.  Will perform frequent neurological checks and patient will require admission for further evaluation by neurology.

## 2024-03-17 NOTE — ED PROVIDER NOTE - CARE PROVIDERS DIRECT ADDRESSES
[email or web address]

## 2024-03-17 NOTE — ED PROVIDER NOTE - PROVIDER TOKENS
PROVIDER:[TOKEN:[[id number]],FOLLOWUP:[1-3 Days]],PROVIDER:[TOKEN:[[id number]],FOLLOWUP:[7-10 Days]]

## 2024-03-17 NOTE — ED ADULT NURSE REASSESSMENT NOTE - NS ED NURSE REASSESS COMMENT FT1
Pt states "I feel fine." pt has no other complaints at this time. pt does not appear in discomfort ro distress at this time. pt updated on plan of care. Daughter at bedside. safety and comfort maintained.

## 2024-03-17 NOTE — ED ADULT TRIAGE NOTE - CHIEF COMPLAINT QUOTE
Pt ambulatory to the ED with multiple medical complaints. Pt's daughter endorses around 2030 the pt was eating dinner when her left eye appeared droopy, became aphasic, had right arm weakness and a "funny smile". Pt has hx of macular degeneration and rheumatoid arthritis. Pt's symptoms have now resolved. Pt denied any blurry vision currently or any LOB. MD SANTAMARIA evaluated pt and no code stroke called. .

## 2024-03-17 NOTE — ED PROVIDER NOTE - OBJECTIVE STATEMENT
83-year-old female with history of macular degeneration, migraine headaches, rheumatoid arthritis brought in by family via POV for evaluation of approximately 1 minute and 20 seconds of expressive aphasia and facial drooping noted by her daughter while they were eating dinner at approximately 8:30 PM last night.  The patient's daughter states that while they are eating, the patient stopped responding to her and seemed like she was attempting to talk but could not get any words out.  Also it look like she had minor facial droop.  This completely resolved and she was alert and oriented on arrival to the emergency department.  The patient notes that she had been seeing floaters for an extended period of time and followed up with her retinal specialist who stated that her exam was normal.  Currently the patient does not have any complaints.  The patient is not on any anticoagulation.  At triage, NIH stroke scale 0.

## 2024-03-17 NOTE — ED ADULT NURSE NOTE - NSFALLUNIVINTERV_ED_ALL_ED
Bed/Stretcher in lowest position, wheels locked, appropriate side rails in place/Call bell, personal items and telephone in reach/Instruct patient to call for assistance before getting out of bed/chair/stretcher/Non-slip footwear applied when patient is off stretcher/[place] to call system/Physically safe environment - no spills, clutter or unnecessary equipment/Purposeful proactive rounding/Room/bathroom lighting operational, light cord in reach

## 2024-03-17 NOTE — ED ADULT NURSE REASSESSMENT NOTE - NS ED NURSE REASSESS COMMENT FT1
Pt c/o itchy throat and described as "dry like." s/p IV contrast. Pt denies SOB and throat closing. No rash, tougne edema noted. Pt does not appear in respiratory distress at this time/ MD Olmos at bedside.

## 2024-03-17 NOTE — ED PROVIDER NOTE - NSFOLLOWUPINSTRUCTIONS_ED_ALL_ED_FT
Transient Ischemic Attack    A transient ischemic attack (TIA) is a "warning stroke" that causes stroke-like symptoms that then go away quickly. The symptoms of a TIA come on suddenly, and they last less than 24 hours. Unlike a stroke, a TIA does not cause permanent damage to the brain. It is important to know the symptoms of a TIA and what to do. Seek medical care right away, even if your symptoms go away.    Having a TIA is a sign that you are at higher risk for a permanent stroke. Lifestyle changes and medical treatments can help prevent a stroke.    What are the causes?     This condition is caused by a temporary blockage in an artery in the head or neck. The blockage does not allow the brain to get the blood supply it needs and can cause various symptoms. The blockage can be caused by:    Fatty buildup in an artery in the head or neck (atherosclerosis).  A blood clot.  Tearing of an artery (dissection).  Inflammation of an artery (vasculitis).    Sometimes the cause is not known.    What increases the risk?  Certain factors may make you more likely to develop this condition. Some of these factors are things that you can change, such as:    Obesity.  Using products that contain nicotine or tobacco, such as cigarettes and e-cigarettes.  Taking oral birth control, especially if you also use tobacco.  Lack of physical activity.  Excessive use of alcohol.  Use of drugs, especially cocaine and methamphetamine.    Other risk factors include:    High blood pressure (hypertension).  High cholesterol.  Diabetes mellitus.  Heart disease (coronary artery disease).  Atrial fibrillation.  Being over the age of 60.  Being male.  Family history of stroke.  Previous history of blood clots, stroke, TIA, or heart attack.  Sickle cell disease.  Being a woman with a history of preeclampsia.  Migraine headache.  Sleep apnea.  Chronic inflammatory diseases, such as rheumatoid arthritis or lupus.  Blood clotting disorders (hypercoagulable state).    What are the signs or symptoms?     Symptoms of this condition are the same as those of a stroke, but they are temporary. The symptoms develop suddenly, and they go away quickly, usually within minutes to hours. Symptoms may include sudden:    Weakness or numbness in your face, arm, or leg, especially on one side of your body.  Trouble walking or difficulty moving your arms or legs.  Trouble speaking, understanding speech, or both (aphasia).  Vision changes in one or both eyes. These include double vision, blurred vision, or loss of vision.  Dizziness.  Confusion.  Loss of balance or coordination.  Nausea and vomiting.  Severe headache with no known cause.    If possible, make note of the exact time that you last felt like your normal self and what time your symptoms started. Tell your health care provider.    How is this diagnosed?  This condition may be diagnosed based on:    Your symptoms and medical history.  A physical exam.  Imaging tests, usually a CT or MRI scan of the brain.  Blood tests.    You may also have other tests, including:    Electrocardiogram (ECG).  Echocardiogram.  Carotid ultrasound.  A scan of the brain circulation (CT angiogram or MRI angiogram).  Continuous heart monitoring.    How is this treated?  The goal of treatment is to reduce the risk for a subsequent stroke. Treatment may include stroke prevention therapies such as:    Changes to diet or lifestyle to decrease your risk. Lifestyle changes may include exercising and stopping smoking.  Medicines to thin the blood (antiplatelets or anticoagulants).  Blood pressure medicines.  Medicines to reduce cholesterol.  Treating other health conditions, such as diabetes or atrial fibrillation.    If testing shows that you have narrowing in the arteries to your brain, your health care provider may recommend a procedure, such as:    Carotid endarterectomy. This is a surgery to remove the blockage from your artery.  Carotid angioplasty and stenting. This is a procedure to open or widen an artery in the neck using a metal mesh tube (stent). The stent helps keep the artery open by supporting the artery walls.    Follow these instructions at home:      Medicines    Take over-the-counter and prescription medicines only as told by your health care provider.  If you were told to take a medicine to thin your blood, such as aspirin or an anticoagulant, take it exactly as told by your health care provider.    Taking too much blood-thinning medicine can cause bleeding.  If you do not take enough blood-thinning medicine, you will not have the protection that you need against a stroke and other problems.        Eating and drinking     Eat 5 or more servings of fruits and vegetables each day.  Follow instructions from your health care provider about diet. You may need to follow a certain nutrition plan to help manage risk factors for stroke, such as high blood pressure, high cholesterol, diabetes, or obesity. This may include:    Eating a low-fat, low-salt diet.  Including a lot of fiber in your diet.  Limiting the amount of carbohydrates and sugar in your diet.  Limit alcohol intake to no more than 1 drink a day for nonpregnant women and 2 drinks a day for men. One drink equals 12 oz of beer, 5 oz of wine, or 1½ oz of hard liquor.        General instructions    Maintain a healthy weight.  Stay physically active. Try to get at least 30 minutes of exercise on most or all days.  Find out if you have sleep apnea, and seek treatment if needed.  Do not use any products that contain nicotine or tobacco, such as cigarettes and e-cigarettes. If you need help quitting, ask your health care provider.  Do not abuse drugs.  Keep all follow-up visits as told by your health care provider. This is important.    Where to find more information  American Stroke Association: www.strokeassociation.org  National Stroke Association: www.stroke.org    Get help right away if:  You have chest pain or an irregular heartbeat.  You have any symptoms of stroke. The acronym BEFAST is an easy way to remember the main warning signs of stroke.    B - Balance problems. Signs include dizziness, sudden trouble walking, or loss of balance.  E - Eye problems. This includes trouble seeing or a sudden change in vision.  F - Face changes. This includes sudden weakness or numbness of the face, or the face or eyelid drooping to one side.  A - Arm weakness or numbness. This happens suddenly and usually on one side of the body.  S - Speech problems. This includes trouble speaking or trouble understanding speech.  T - Time. Time to call 911 or seek emergency care. Do not wait to see if symptoms will go away. Make note of the time your symptoms started.  Other signs of stroke may include:    A sudden, severe headache with no known cause.  Nausea or vomiting.  Seizure.    These symptoms may represent a serious problem that is an emergency. Do not wait to see if the symptoms will go away. Get medical help right away. Call your local emergency services (911 in the [place] Do not drive yourself to the hospital.    Summary  A TIA happens when an artery in the head or neck is blocked, leading to stroke-like symptoms that then go away quickly. The blockage clears before there is any permanent brain damage. A TIA is a medical emergency and requires immediate medical attention.  Symptoms of this condition are the same as those of a stroke, but they are temporary. The symptoms usually develop suddenly, and they go away quickly, usually within minutes to hours.  Having a TIA means that you are at high risk of a stroke in the near future.  Treatment may include medicines to thin the blood as well as medicines, diet changes, and lifestyle changes to manage conditions that increase the risk of another TIA or a stroke.    ADDITIONAL NOTES AND INSTRUCTIONS    Please follow up with your Primary MD in 24-48 hr.  Seek immediate medical care for any new/worsening signs or symptoms.     Take aspirin 325 mg daily until further advised

## 2024-03-17 NOTE — ED PROVIDER NOTE - PHYSICAL EXAMINATION
CONSTITUTIONAL: Well appearing, awake, alert, oriented to person, place, time/situation and in no apparent distress.  · ENMT: Airway patent, Nasal mucosa clear. Mouth with normal mucosa. Throat has no vesicles, no oropharyngeal exudates and uvula is midline.  · EYES: Clear bilaterally, pupils equal, round and reactive to light.  · CARDIAC: Normal rate, regular rhythm.  Heart sounds S1, S2.  No murmurs, rubs or gallops.  · RESPIRATORY: Breath sounds clear and equal bilaterally.  · MUSCULOSKELETAL: Spine appears normal, range of motion is not limited, no muscle or joint tenderness  · NEUROLOGICAL: Alert and oriented, no focal deficits, no motor or sensory deficits.  · SKIN: Skin normal color for race, warm, dry and intact. No evidence of rash    NIH stroke scale 0, patient ambulatory without assistance or ataxia

## 2024-03-17 NOTE — CHART NOTE - NSCHARTNOTEFT_GEN_A_CORE
In brief, Cierra Lowe is an 83 year old Female who presents to the hospital with a transient episode of expressive aphasia and facial droop discovered while eating dinner at approximately 8:30 PM last night which had resolved prior to arrival. 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     Recommendation:  No acute neurosurgical intervention at this time  The patient can continue medical work-up for TIA vs. seizure  No neurosurgical contraindication to AC/anti-platelet medicines if required  Outpatient follow up with Dr. Devante Abraham (interventional neuroradiology) 2 weeks after discharge. Office# [phone]

## 2024-03-17 NOTE — ED PROVIDER NOTE - PROGRESS NOTE DETAILS
4 mm pseudoaneurysm noted on CTA which was reviewed with the neurosurgery PA.  They state that there is no acute surgical intervention for this and that the patient may follow-up in 2 weeks with Dr. Devante Abraham.  They states that the patient may be anticoagulated.  I advised the patient that she would require admission for further TIA workup and she states that she would like to obtain outpatient neurology follow-up.  Advised her that there is risk of further deterioration or possible acute stroke that could occur at home she signed out AMA and she understands the risk.  Patient given strict return precautions.

## 2024-03-17 NOTE — ED ADULT NURSE NOTE - OBJECTIVE STATEMENT
pt is 84yo F, A&Ox3 who presents to ED with multiple medical complaints. As per pt's daughter at bedside, pt was eating dinner when pt's left to droop had "funny smile" and she seemed "out of it". pt's daughter states "she had food in her mouth when she stopped chewing. when we tried talking to her she wasn't responding. My son hugged her and she seemed to snap back into it." pt states she tried talking but was unable to. pt denies feeling dizzy, weak, or lightheaded prior to episode. Pt reports having 2 glasses of wine and states "that was the first time I drank alcohol in years". Pt appears well and not in distress at this time. Pt has no other complaints. No use of blood thinners. No code stroke called. No weakness noted upon assessment.   Denies chest pain, SOB, n/v/d, weakness, numbness, tingling, headache  hx of RA, hyponatremia

## 2024-03-19 ENCOUNTER — INPATIENT (INPATIENT)
Facility: HOSPITAL | Age: 84
LOS: 1 days | Discharge: HOME | DRG: 62 | End: 2024-03-21
Attending: STUDENT IN AN ORGANIZED HEALTH CARE EDUCATION/TRAINING PROGRAM | Admitting: STUDENT IN AN ORGANIZED HEALTH CARE EDUCATION/TRAINING PROGRAM
Payer: MEDICARE

## 2024-03-19 ENCOUNTER — RESULT REVIEW (OUTPATIENT)
Age: 84
End: 2024-03-19

## 2024-03-19 VITALS
RESPIRATION RATE: 16 BRPM | OXYGEN SATURATION: 100 % | HEART RATE: 84 BPM | WEIGHT: 108.69 LBS | HEIGHT: 63 IN | SYSTOLIC BLOOD PRESSURE: 147 MMHG | DIASTOLIC BLOOD PRESSURE: 84 MMHG

## 2024-03-19 DIAGNOSIS — M06.9 RHEUMATOID ARTHRITIS, UNSPECIFIED: ICD-10-CM

## 2024-03-19 DIAGNOSIS — Z98.890 OTHER SPECIFIED POSTPROCEDURAL STATES: Chronic | ICD-10-CM

## 2024-03-19 DIAGNOSIS — Z98.49 CATARACT EXTRACTION STATUS, UNSPECIFIED EYE: Chronic | ICD-10-CM

## 2024-03-19 DIAGNOSIS — K21.9 GASTRO-ESOPHAGEAL REFLUX DISEASE WITHOUT ESOPHAGITIS: ICD-10-CM

## 2024-03-19 DIAGNOSIS — I63.9 CEREBRAL INFARCTION, UNSPECIFIED: ICD-10-CM

## 2024-03-19 DIAGNOSIS — Z29.9 ENCOUNTER FOR PROPHYLACTIC MEASURES, UNSPECIFIED: ICD-10-CM

## 2024-03-19 LAB
ALBUMIN SERPL ELPH-MCNC: 3.4 G/DL — SIGNIFICANT CHANGE UP (ref 3.3–5)
ALBUMIN SERPL ELPH-MCNC: 3.5 G/DL — SIGNIFICANT CHANGE UP (ref 3.3–5)
ALP SERPL-CCNC: 81 U/L — SIGNIFICANT CHANGE UP (ref 40–120)
ALP SERPL-CCNC: 88 U/L — SIGNIFICANT CHANGE UP (ref 40–120)
ALT FLD-CCNC: 31 U/L — SIGNIFICANT CHANGE UP (ref 12–78)
ALT FLD-CCNC: 32 U/L — SIGNIFICANT CHANGE UP (ref 12–78)
ANION GAP SERPL CALC-SCNC: 4 MMOL/L — LOW (ref 5–17)
ANION GAP SERPL CALC-SCNC: 7 MMOL/L — SIGNIFICANT CHANGE UP (ref 5–17)
APTT BLD: 27.8 SEC — SIGNIFICANT CHANGE UP (ref 24.5–35.6)
APTT BLD: 27.8 SEC — SIGNIFICANT CHANGE UP (ref 24.5–35.6)
AST SERPL-CCNC: 36 U/L — SIGNIFICANT CHANGE UP (ref 15–37)
AST SERPL-CCNC: 37 U/L — SIGNIFICANT CHANGE UP (ref 15–37)
BASOPHILS # BLD AUTO: 0.06 K/UL — SIGNIFICANT CHANGE UP (ref 0–0.2)
BASOPHILS # BLD AUTO: 0.06 K/UL — SIGNIFICANT CHANGE UP (ref 0–0.2)
BASOPHILS NFR BLD AUTO: 0.7 % — SIGNIFICANT CHANGE UP (ref 0–2)
BASOPHILS NFR BLD AUTO: 1 % — SIGNIFICANT CHANGE UP (ref 0–2)
BILIRUB SERPL-MCNC: 0.4 MG/DL — SIGNIFICANT CHANGE UP (ref 0.2–1.2)
BILIRUB SERPL-MCNC: 0.6 MG/DL — SIGNIFICANT CHANGE UP (ref 0.2–1.2)
BUN SERPL-MCNC: 11 MG/DL — SIGNIFICANT CHANGE UP (ref 7–23)
BUN SERPL-MCNC: 9 MG/DL — SIGNIFICANT CHANGE UP (ref 7–23)
CALCIUM SERPL-MCNC: 8.5 MG/DL — SIGNIFICANT CHANGE UP (ref 8.5–10.1)
CALCIUM SERPL-MCNC: 8.7 MG/DL — SIGNIFICANT CHANGE UP (ref 8.5–10.1)
CHLORIDE SERPL-SCNC: 105 MMOL/L — SIGNIFICANT CHANGE UP (ref 96–108)
CHLORIDE SERPL-SCNC: 107 MMOL/L — SIGNIFICANT CHANGE UP (ref 96–108)
CK MB BLD-MCNC: 3 % — SIGNIFICANT CHANGE UP (ref 0–3.5)
CK MB BLD-MCNC: 3.5 % — SIGNIFICANT CHANGE UP (ref 0–3.5)
CK MB CFR SERPL CALC: 4.6 NG/ML — HIGH (ref 0–3.6)
CK MB CFR SERPL CALC: 5.5 NG/ML — HIGH (ref 0–3.6)
CK SERPL-CCNC: 152 U/L — SIGNIFICANT CHANGE UP (ref 26–192)
CK SERPL-CCNC: 157 U/L — SIGNIFICANT CHANGE UP (ref 26–192)
CO2 SERPL-SCNC: 27 MMOL/L — SIGNIFICANT CHANGE UP (ref 22–31)
CO2 SERPL-SCNC: 29 MMOL/L — SIGNIFICANT CHANGE UP (ref 22–31)
CREAT SERPL-MCNC: 0.48 MG/DL — LOW (ref 0.5–1.3)
CREAT SERPL-MCNC: 0.58 MG/DL — SIGNIFICANT CHANGE UP (ref 0.5–1.3)
EGFR: 91 ML/MIN/1.73M2 — SIGNIFICANT CHANGE UP
EGFR: 95 ML/MIN/1.73M2 — SIGNIFICANT CHANGE UP
EOSINOPHIL # BLD AUTO: 0.03 K/UL — SIGNIFICANT CHANGE UP (ref 0–0.5)
EOSINOPHIL # BLD AUTO: 0.13 K/UL — SIGNIFICANT CHANGE UP (ref 0–0.5)
EOSINOPHIL NFR BLD AUTO: 0.3 % — SIGNIFICANT CHANGE UP (ref 0–6)
EOSINOPHIL NFR BLD AUTO: 2.2 % — SIGNIFICANT CHANGE UP (ref 0–6)
GLUCOSE SERPL-MCNC: 102 MG/DL — HIGH (ref 70–99)
GLUCOSE SERPL-MCNC: 95 MG/DL — SIGNIFICANT CHANGE UP (ref 70–99)
HCT VFR BLD CALC: 40.7 % — SIGNIFICANT CHANGE UP (ref 34.5–45)
HCT VFR BLD CALC: 42.4 % — SIGNIFICANT CHANGE UP (ref 34.5–45)
HGB BLD-MCNC: 13.6 G/DL — SIGNIFICANT CHANGE UP (ref 11.5–15.5)
HGB BLD-MCNC: 14.1 G/DL — SIGNIFICANT CHANGE UP (ref 11.5–15.5)
IMM GRANULOCYTES NFR BLD AUTO: 0.2 % — SIGNIFICANT CHANGE UP (ref 0–0.9)
IMM GRANULOCYTES NFR BLD AUTO: 0.2 % — SIGNIFICANT CHANGE UP (ref 0–0.9)
INR BLD: 0.9 RATIO — SIGNIFICANT CHANGE UP (ref 0.85–1.18)
INR BLD: 0.95 RATIO — SIGNIFICANT CHANGE UP (ref 0.85–1.18)
LYMPHOCYTES # BLD AUTO: 1.82 K/UL — SIGNIFICANT CHANGE UP (ref 1–3.3)
LYMPHOCYTES # BLD AUTO: 2.1 K/UL — SIGNIFICANT CHANGE UP (ref 1–3.3)
LYMPHOCYTES # BLD AUTO: 24.2 % — SIGNIFICANT CHANGE UP (ref 13–44)
LYMPHOCYTES # BLD AUTO: 31.1 % — SIGNIFICANT CHANGE UP (ref 13–44)
MAGNESIUM SERPL-MCNC: 2.1 MG/DL — SIGNIFICANT CHANGE UP (ref 1.6–2.6)
MCHC RBC-ENTMCNC: 31.6 PG — SIGNIFICANT CHANGE UP (ref 27–34)
MCHC RBC-ENTMCNC: 31.7 PG — SIGNIFICANT CHANGE UP (ref 27–34)
MCHC RBC-ENTMCNC: 33.3 GM/DL — SIGNIFICANT CHANGE UP (ref 32–36)
MCHC RBC-ENTMCNC: 33.4 GM/DL — SIGNIFICANT CHANGE UP (ref 32–36)
MCV RBC AUTO: 94.4 FL — SIGNIFICANT CHANGE UP (ref 80–100)
MCV RBC AUTO: 95.3 FL — SIGNIFICANT CHANGE UP (ref 80–100)
MONOCYTES # BLD AUTO: 0.57 K/UL — SIGNIFICANT CHANGE UP (ref 0–0.9)
MONOCYTES # BLD AUTO: 0.58 K/UL — SIGNIFICANT CHANGE UP (ref 0–0.9)
MONOCYTES NFR BLD AUTO: 6.6 % — SIGNIFICANT CHANGE UP (ref 2–14)
MONOCYTES NFR BLD AUTO: 9.9 % — SIGNIFICANT CHANGE UP (ref 2–14)
NEUTROPHILS # BLD AUTO: 3.25 K/UL — SIGNIFICANT CHANGE UP (ref 1.8–7.4)
NEUTROPHILS # BLD AUTO: 5.91 K/UL — SIGNIFICANT CHANGE UP (ref 1.8–7.4)
NEUTROPHILS NFR BLD AUTO: 55.6 % — SIGNIFICANT CHANGE UP (ref 43–77)
NEUTROPHILS NFR BLD AUTO: 68 % — SIGNIFICANT CHANGE UP (ref 43–77)
NRBC # BLD: 0 /100 WBCS — SIGNIFICANT CHANGE UP (ref 0–0)
NRBC # BLD: 0 /100 WBCS — SIGNIFICANT CHANGE UP (ref 0–0)
PHOSPHATE SERPL-MCNC: 2.9 MG/DL — SIGNIFICANT CHANGE UP (ref 2.5–4.5)
PLATELET # BLD AUTO: 250 K/UL — SIGNIFICANT CHANGE UP (ref 150–400)
PLATELET # BLD AUTO: 256 K/UL — SIGNIFICANT CHANGE UP (ref 150–400)
POTASSIUM SERPL-MCNC: 4 MMOL/L — SIGNIFICANT CHANGE UP (ref 3.5–5.3)
POTASSIUM SERPL-MCNC: 4.1 MMOL/L — SIGNIFICANT CHANGE UP (ref 3.5–5.3)
POTASSIUM SERPL-SCNC: 4 MMOL/L — SIGNIFICANT CHANGE UP (ref 3.5–5.3)
POTASSIUM SERPL-SCNC: 4.1 MMOL/L — SIGNIFICANT CHANGE UP (ref 3.5–5.3)
PROT SERPL-MCNC: 6.9 G/DL — SIGNIFICANT CHANGE UP (ref 6–8.3)
PROT SERPL-MCNC: 7.2 G/DL — SIGNIFICANT CHANGE UP (ref 6–8.3)
PROTHROM AB SERPL-ACNC: 10.6 SEC — SIGNIFICANT CHANGE UP (ref 9.5–13)
PROTHROM AB SERPL-ACNC: 11.1 SEC — SIGNIFICANT CHANGE UP (ref 9.5–13)
RBC # BLD: 4.31 M/UL — SIGNIFICANT CHANGE UP (ref 3.8–5.2)
RBC # BLD: 4.45 M/UL — SIGNIFICANT CHANGE UP (ref 3.8–5.2)
RBC # FLD: 13.4 % — SIGNIFICANT CHANGE UP (ref 10.3–14.5)
RBC # FLD: 13.4 % — SIGNIFICANT CHANGE UP (ref 10.3–14.5)
SODIUM SERPL-SCNC: 139 MMOL/L — SIGNIFICANT CHANGE UP (ref 135–145)
SODIUM SERPL-SCNC: 140 MMOL/L — SIGNIFICANT CHANGE UP (ref 135–145)
TROPONIN I, HIGH SENSITIVITY RESULT: 13.6 NG/L — SIGNIFICANT CHANGE UP
TROPONIN I, HIGH SENSITIVITY RESULT: 17.7 NG/L — SIGNIFICANT CHANGE UP
WBC # BLD: 5.85 K/UL — SIGNIFICANT CHANGE UP (ref 3.8–10.5)
WBC # BLD: 8.69 K/UL — SIGNIFICANT CHANGE UP (ref 3.8–10.5)
WBC # FLD AUTO: 5.85 K/UL — SIGNIFICANT CHANGE UP (ref 3.8–10.5)
WBC # FLD AUTO: 8.69 K/UL — SIGNIFICANT CHANGE UP (ref 3.8–10.5)

## 2024-03-19 PROCEDURE — 93880 EXTRACRANIAL BILAT STUDY: CPT | Mod: 26

## 2024-03-19 PROCEDURE — 0042T: CPT | Mod: MC

## 2024-03-19 PROCEDURE — 70498 CT ANGIOGRAPHY NECK: CPT | Mod: 26,MC

## 2024-03-19 PROCEDURE — 70450 CT HEAD/BRAIN W/O DYE: CPT | Mod: 26,76,59,MC

## 2024-03-19 PROCEDURE — 93306 TTE W/DOPPLER COMPLETE: CPT | Mod: 26

## 2024-03-19 PROCEDURE — 71045 X-RAY EXAM CHEST 1 VIEW: CPT | Mod: 26

## 2024-03-19 PROCEDURE — 99223 1ST HOSP IP/OBS HIGH 75: CPT | Mod: AI

## 2024-03-19 PROCEDURE — 70551 MRI BRAIN STEM W/O DYE: CPT | Mod: 26

## 2024-03-19 PROCEDURE — 93010 ELECTROCARDIOGRAM REPORT: CPT

## 2024-03-19 PROCEDURE — 70496 CT ANGIOGRAPHY HEAD: CPT | Mod: 26,MC

## 2024-03-19 PROCEDURE — 99291 CRITICAL CARE FIRST HOUR: CPT

## 2024-03-19 RX ORDER — SODIUM CHLORIDE 9 MG/ML
10 INJECTION INTRAMUSCULAR; INTRAVENOUS; SUBCUTANEOUS ONCE
Refills: 0 | Status: COMPLETED | OUTPATIENT
Start: 2024-03-19 | End: 2024-03-19

## 2024-03-19 RX ORDER — ATORVASTATIN CALCIUM 80 MG/1
80 TABLET, FILM COATED ORAL AT BEDTIME
Refills: 0 | Status: DISCONTINUED | OUTPATIENT
Start: 2024-03-20 | End: 2024-03-21

## 2024-03-19 RX ORDER — DEXMEDETOMIDINE HYDROCHLORIDE IN 0.9% SODIUM CHLORIDE 4 UG/ML
0.3 INJECTION INTRAVENOUS
Qty: 400 | Refills: 0 | Status: DISCONTINUED | OUTPATIENT
Start: 2024-03-19 | End: 2024-03-19

## 2024-03-19 RX ORDER — TIOTROPIUM BROMIDE 18 UG/1
2 CAPSULE ORAL; RESPIRATORY (INHALATION) DAILY
Refills: 0 | Status: DISCONTINUED | OUTPATIENT
Start: 2024-03-19 | End: 2024-03-19

## 2024-03-19 RX ORDER — INSULIN LISPRO 100/ML
VIAL (ML) SUBCUTANEOUS EVERY 6 HOURS
Refills: 0 | Status: DISCONTINUED | OUTPATIENT
Start: 2024-03-19 | End: 2024-03-20

## 2024-03-19 RX ORDER — PANTOPRAZOLE SODIUM 20 MG/1
40 TABLET, DELAYED RELEASE ORAL DAILY
Refills: 0 | Status: DISCONTINUED | OUTPATIENT
Start: 2024-03-19 | End: 2024-03-20

## 2024-03-19 RX ORDER — TENECTEPLASE 50 MG
12 KIT INTRAVENOUS ONCE
Refills: 0 | Status: COMPLETED | OUTPATIENT
Start: 2024-03-19 | End: 2024-03-19

## 2024-03-19 RX ORDER — HYDROCORTISONE 20 MG
40 TABLET ORAL DAILY
Refills: 0 | Status: DISCONTINUED | OUTPATIENT
Start: 2024-03-20 | End: 2024-03-20

## 2024-03-19 RX ADMIN — SODIUM CHLORIDE 10 MILLILITER(S): 9 INJECTION INTRAMUSCULAR; INTRAVENOUS; SUBCUTANEOUS at 09:41

## 2024-03-19 RX ADMIN — SODIUM CHLORIDE 10 MILLILITER(S): 9 INJECTION INTRAMUSCULAR; INTRAVENOUS; SUBCUTANEOUS at 09:40

## 2024-03-19 RX ADMIN — TENECTEPLASE 1728 MILLIGRAM(S): KIT at 09:41

## 2024-03-19 RX ADMIN — DEXMEDETOMIDINE HYDROCHLORIDE IN 0.9% SODIUM CHLORIDE 3.7 MICROGRAM(S)/KG/HR: 4 INJECTION INTRAVENOUS at 15:47

## 2024-03-19 RX ADMIN — PANTOPRAZOLE SODIUM 40 MILLIGRAM(S): 20 TABLET, DELAYED RELEASE ORAL at 12:38

## 2024-03-19 NOTE — CONSULT NOTE ADULT - ASSESSMENT
Assessment:     83y old Female with stated hx significant for     Plan:  #Neuro:  ==acute CVA  ==s/p TNK 3/19/24 0941  ==migraine Hx  -Neuro checks q 1 hr and prn for changes  -s/p CT 3/19/24 - (see HPI)  -MRI brain  -Repeat Head CT in 24 hrs for f/u or change in mental status  -seizure precautions  -duplex/Ultrasound of carotid arteries   -Initiate ASA within 24-48 hrs after TPA, avoid a/c within 24 hrs of TPA therapy  -Physical/Occupational therapy consult     #Pulm:  ==COPD Hx (no home O2)  -supplemental O2 to maintain SPO2  94-98%  -HOB >/= 30 degree angle    #CV:  ==Acute CVA  -ecg now and q am   -cardiac enzymes now and q 8 hrs x 3  -TTE to eval RVFx/LVFx   -Maintain SBP< 185/ DBP<110:  - Labetalol 10 -20 mg IVP q 1 hr prn  -Nicardipene gtt start @ 5mg /hr if unable to control BP with labetalol  -phenylephrine gtt to maintain -170 as needed  -statin therapy with atorvastatin 80 mg po qhs 24 hrs after TPA    #GI/:  ==GERD Hx  -NPO at present  -dysphagia screening prior to p.o intake  -protonix 40 mg IV qd    #I.D.:  -currently no need for antibx    #FEN/HEME/ENDO:  ==Rheumatoid Arthritis Hx   -obtain CMP/Mg++/PO--4/CBC w diff/PT/PTT/INR at 1400 today and q. a.m.  -maintain normothermic - tylenol prn (less then 4gms/24hrs)  -Hold prophylaxis Heparin/Lovenox therapy x 24 hrs post TPA therapy  -TPA therapy   -POC glucose q 6 hrs - maintain glucose 160-180  -will hold any arterial blood draws for 24 hrs  -HgbA1c  -Lipid profile/cholesterol level        Critical Care Time: 40minutes   Reviewing data, imaging, discussing with multidisciplinary team, not inclusive of procedures, discussing goals of care with family       Assessment:     83y old Female with stated hx significant for COPD/emphysema, spontaneous pneumothorax, former smoker (not one home O2), RA on prednisone and tocilizumab, GERD, resolved HYPOnatremia, resolved cellulitis, macular degeneration, migraines, recent TIA. Presents to [facility] ED with Lt facial droop and expressive aphasia, NIH of 4, s/p TNK in ED today 3/19/24 0941. Admitted to ICU for further management and observation post TNK.    Plan:  #Neuro:  ==acute CVA  ==s/p TNK 3/19/24 0941  ==migraine Hx  -Neuro checks q 1 hr and prn for changes  -s/p CT 3/19/24 - (see HPI)  -MRI brain - will use precedex for procedure (pt with anxiety with MRI's)  -Repeat Head CT in 24 hrs for f/u or change in mental status  -seizure precautions  -duplex/Ultrasound of carotid arteries   -Initiate ASA within 24-48 hrs after TPA, avoid a/c within 24 hrs of TPA therapy  -Physical/Occupational therapy consult     #Pulm:  ==COPD Hx (no home O2)  ==spontaneous pneumothorax hx  -supplemental O2 to maintain SPO2  94-98%  -HOB >/= 30 degree angle    #CV:  ==Acute CVA  -ecg now and q am   -cardiac enzymes now and q 8 hrs x 3  -TTE to eval RVFx/LVFx   -US duplex b/l carotids  -Maintain SBP< 185/ DBP<110:  - Labetalol 10 -20 mg IVP q 1 hr prn  -Nicardipene gtt start @ 5mg /hr if unable to control BP with labetalol  -phenylephrine gtt to maintain -170 as needed  -statin therapy with atorvastatin 80 mg po qhs 24 hrs after TPA    #GI/:  ==GERD Hx  -NPO at present  -dysphagia screening prior to p.o intake  -protonix 40 mg IV qd    #I.D.:  ==No issues at present  -currently no need for antibx    #FEN/HEME/ENDO:  ==Rheumatoid Arthritis Hx   ==recent resolved HYPOnatremia (diet induced)  -obtain CMP/Mg++/PO--4/CBC w diff/PT/PTT/INR at 1400 today and q. a.m.  -maintain normothermic - tylenol prn (less then 4gms/24hrs)  -Hold prophylaxis Heparin/Lovenox therapy x 24 hrs post TPA therapy  -s/p TNK therapy 3/19/24 0941  -POC glucose q 6 hrs - maintain glucose 160-180  -will hold any arterial blood draws for 24 hrs  -HgbA1c in a.m.  -Lipid profile/cholesterol level  -on tocilizumab therapy - last treatment 3/4/24        Critical Care Time: 40minutes   Reviewing data, imaging, discussing with multidisciplinary team, not inclusive of procedures, discussing goals of care with family

## 2024-03-19 NOTE — ED PROVIDER NOTE - OBJECTIVE STATEMENT
Pmd Dr. J. Boxer [facility]   Patient is an 83-year-old female with a history of rheumatoid arthritis.  She gets monthly biologic infusions.  No significant surgical history.  Remote smoker.  Who was admitted to [facility] and evaluated for TIA 36 hours ago.  And discharged home.  She was in her usual state of health this morning.  And while driving to her children's home to help them at 745 she had sudden onset of feeling fatigued.  She noticed when she got into the house that she was having trouble with word finding and difficulty speaking.  EMS was called, and patient was brought to the emergency room for evaluation.  Patient provided the entirety of this history, and her speech is returned to almost normal.  She denies any weakness or numbness.  She denies any recent trauma or travel.  She denies any alcohol or drug use.  She has an allergy to penicillin and tramadol. Pmd Dr. J. Boxer [facility] [place]   Patient is an 83-year-old female with a history of rheumatoid arthritis.  She gets monthly biologic infusions.  No significant surgical history.  Remote smoker.  Who was admitted to [facility] and evaluated for TIA 36 hours ago.  And discharged home.  She was in her usual state of health this morning.  And while driving to her children's home to help them at 745 she had sudden onset of feeling fatigued.  She noticed when she got into the house that she was having trouble with word finding and difficulty speaking.  EMS was called, and patient was brought to the emergency room for evaluation.  Patient provided the entirety of this history, and her speech is returned to almost normal.  She denies any weakness or numbness.  She denies any recent trauma or travel.  She denies any alcohol or drug use.  She has an allergy to penicillin and tramadol.

## 2024-03-19 NOTE — CARE COORDINATION ASSESSMENT. - READMITTED REASON YN
Pt was recently admitted to [facility] with TIA, left AMA and was admitted to [place] ED 3/19/24 c/o slurred speech/Yes

## 2024-03-19 NOTE — ED ADULT NURSE NOTE - OBJECTIVE STATEMENT
Pt BIBA c/o difficulty speaking. Pt states she was driving to her family house at 0745 and noticed that she felt very tired. Once she got to her family's house, they noticed she was having difficulty speaking. Pt BIBA c/o difficulty speaking. Pt states she was driving to her family house at 0745 and noticed that she felt very tired. Once she got to her family's house, they noticed she was having difficulty speaking. Upon arrival pt noted to have difficulty speaking, no slurred speech noted, (+) PERRLA and pt moving all extremities evenly and equally. Stroke code called. CT done.

## 2024-03-19 NOTE — PHARMACOTHERAPY INTERVENTION NOTE - COMMENTS
Patient is an 82 yo female presenting as a code stroke. Medication history completed with patient's daughter at bedside and with [facility] in [place], [place] ([phone]). Of note, patient does not endorse any aspirin/anticoagulant/antiplatelet-use. Medication reconciliation completed, findings relayed to ED Dr. Brady. Patient is an 84 yo female presenting as a code stroke. Medication history completed with patient's daughter at bedside and with [facility] in [place], [place] ([phone]). Of note, patient does not endorse any aspirin/anticoagulant/antiplatelet-use. Medication reconciliation completed, findings relayed to ED Dr. Brady.    Confirmed patient's monthly biologic infusion with outpatient provider's office (Dr. Gallegos- [phone]). Patient's last infusion was 3/4/24 for tocilizumab 192 mg IV infusion over 1 hour.

## 2024-03-19 NOTE — STROKE CODE NOTE - NIH STROKE SCALE: 9. BEST LANGUAGE, QM
No (2) Severe aphasia; all communication is through fragmentary expression; great need for inference, questioning, and guessing by the listener. Range of information that can be exchanged is limited; listener carries burden of communication. Examiner cannot identify materials provided from patient response.

## 2024-03-19 NOTE — H&P ADULT - NSHPOUTPATIENTPROVIDERS_GEN_ALL_CORE
Pmd Dr. J. Boxer [facility] PCP: Dr. J. Boxer [facility]

## 2024-03-19 NOTE — CARE COORDINATION ASSESSMENT. - NSADDITIONAL INFORMATION_FT
82 y/o female h/o rheumatoid arthritis, c/o slurred speech, recently admitted to [facility] evaluated for TIA and left AMA. Pt returned home to normal functioning of ADLS, driving shopping etc. and was brought into ED today with changes in speech, r/o CVA. PT is alert and oriented, has family support, children are involved with care, no prior services reported at time. Pt awaits further medical workup to determine needs if indicated upon DC. SW to remains available to ensure safe transitional planning.

## 2024-03-19 NOTE — ED PROVIDER NOTE - CLINICAL SUMMARY MEDICAL DECISION MAKING FREE TEXT BOX
83 female with a history of recent TIA at [facility], presents now with sudden onset of speech/word finding issues at 7:45 AM.  Brought in by EMS.  Symptoms have almost resolved.  Patient has no other localizing symptoms.  Plan of care includes activated code stroke.  Emergent CT imaging.  Patient has a cardiac murmur.  Will obtain an EKG.  To rule out arrhythmia.  Patient will likely need admission to the hospital given her second TIA like event within the last 36 hours.  She is remote former smoker.  She has rheumatoid arthritis and is only on a monthly infusion of a biologic.  Plan of care includes neurology consultation laboratory studies cardiac monitor admission to the telemetry service for further care and evaluation.  Pending CT.  This chart was made with dictation software and may contain typographical errors. 83 female with a history of recent TIA at [facility], presents now with sudden onset of speech/word finding issues at 7:45 AM.  Brought in by EMS.  Symptoms have almost resolved.  Patient has no other localizing symptoms.  Plan of care includes activated code stroke.  Emergent CT imaging.  Patient has a cardiac murmur.  Will obtain an EKG.  To rule out arrhythmia.  Patient will likely need admission to the hospital given her second TIA like event within the last 36 hours.  She is remote former smoker.  She has rheumatoid arthritis and is only on a monthly infusion of a biologic.  Plan of care includes neurology consultation laboratory studies cardiac monitor admission to the telemetry service for further care and evaluation.  Pending CT.  This chart was made with dictation software and may contain typographical errors.  pt had sudden return of speech changes = in accordance with instructions by stroke neurology pt to get tnk  r b a dw pt and family pt wants lytics  lytics administered

## 2024-03-19 NOTE — H&P ADULT - PROBLEM SELECTOR PLAN 2
- chronic problem  - recommend continue prednisone 2 mg daily, home med  - management per ICU Chronic. Gets monthly Actemra infusions (last infusion 2weeks ago)   - Continue home prednisone 1 mg daily  - Management per ICU Chronic. Gets monthly Actemra infusions (last infusion 2weeks ago)   - Continue home prednisone 2 mg daily  - Management per ICU

## 2024-03-19 NOTE — H&P ADULT - PROBLEM SELECTOR PLAN 1
- Pt found to have NIH stroke scale 4  for R facial weakness, trace dysarthria, and difficulty with word finding/anomia.   - Tele Neuro recommended TNK  -Admit to ICU for further evaluation and management for CVA with TNK  -CT head -negative for acute bleeds  -Goal -180, keep diastolic <105  -Neuro Dr. Rivera consulted  -FU TTE, MRA/MRI head/neck  -Aspiration, seizure, fall precaution  -neuro checks Q4  -Would recommend strict NPO & IVF until passes bedside dysphagia screen   - FU Speech and swallow eval  -PT and OT consultation  -Check A1c, lipid profile, TSH for further risk stratification  -start statin after dysphagia screen  - hold ASA until cleared by neurology s/p TNK  - management per ICU - Pt found to have NIH stroke scale 4  for R facial weakness, trace dysarthria, and difficulty with word finding/anomia.   - Tele Neuro recommended TNK  -Admit to ICU for further evaluation and management for CVA with TNK  -CT head noncontrast:   New calcific density in the left MCA insula branch may suggest embolic calcific plaque. Mild chronic microvascular changes without evidence of an acute transcortical infarction or hemorrhage  -CTA brain, CTA neck, CT perfusion: No core infarct. Possible brain at risk in left frontal lobe measuring 29 mL. Mild narrowing of the left MCA sylvian branch. No large vessel occlusion. Consider MRI of the brain for further evaluation.  -Goal -180, keep diastolic <105  -Neuro Dr. Rivera consulted  -FU TTE, MRA/MRI head/neck  -Aspiration, seizure, fall precaution  -neuro checks Q4  -Would recommend strict NPO & IVF until passes bedside dysphagia screen   - FU Speech and swallow eval  -PT and OT consultation  -Check A1c, lipid profile, TSH for further risk stratification  -start statin after dysphagia screen  - hold ASA until cleared by neurology s/p TNK  - management per ICU Code stroke called for NIHSS 4: R facial weakness, trace dysarthria, and difficulty with word finding/anomia. S/p TNK   - CT head noncon:  New calcific density in the left MCA insula branch may suggest embolic calcific plaque. Mild chronic microvascular changes without evidence of an acute transcortical infarction or hemorrhage  - CTA brain, CTA neck, CT perfusion: No core infarct. Possible brain at risk in left frontal lobe measuring 29 mL. Mild narrowing of the left MCA sylvian branch. No large vessel occlusion.   - Admit to ICU for further evaluation and management for CVA s/p TNK  - Goal -180, keep diastolic <105  - FU TTE, MRA/MRI head/neck  - F/u A1c, lipid profile, TSH   - Aspiration, seizure, fall precaution  - Neuro checks every hour   - Repeat CTH at 24hrs post TNK  - Would recommend strict NPO & IVF until passes bedside dysphagia screen   - FU Speech and swallow eval --> start statin after dysphagia screen  - Hold ASA until cleared by neurology s/p TNK  - PT and OT consultation  - Neuro Dr. Rivera consulted  - Management per ICU Code stroke called for NIHSS 4: R facial weakness, trace dysarthria, and difficulty with word finding/anomia. S/p TNK   - CT head noncon:  New calcific density in the left MCA insula branch may suggest embolic calcific plaque. Mild chronic microvascular changes without evidence of an acute transcortical infarction or hemorrhage  - CTA brain, CTA neck, CT perfusion: No core infarct. Possible brain at risk in left frontal lobe measuring 29 mL. Mild narrowing of the left MCA sylvian branch. No large vessel occlusion.   - Admit to ICU for further evaluation and management for CVA s/p TNK  - Goal -180, keep diastolic <105  - F/u TTE  - F/u MRA/MRI head/neck (of note, patient has severe anxiety about MRIs, will likely need sedation)  - F/u A1c, lipid profile, TSH   - Aspiration, seizure, fall precaution  - Neuro checks every hour   - Repeat CTH at 24hrs post TNK  - Would recommend strict NPO & IVF until passes bedside dysphagia screen   - FU Speech and swallow eval --> start statin after dysphagia screen  - Hold ASA until cleared by neurology s/p TNK  - PT and OT consultation  - Neuro Dr. Rivera consulted  - Management per ICU

## 2024-03-19 NOTE — CARE COORDINATION ASSESSMENT. - NSCAREPROVIDERS_GEN_ALL_CORE_FT
CARE PROVIDERS:  Accepting Physician: Jeremiah Tovar  Administration: Antoine Monreal  Admitting: Jeremiah Tovar  Attending: Jeremiah Tovar  Consultant: Jarett Hendrix  Consultant: Jaskaran Vang  Covering Team: Lin Franocis  ED Attending: Roosevelt Brady  ED Nurse: Emily Torres  Nurse: Alejandra Hung  Ordered: ServiceAccount, [facility] HospitalMLM  Override: Alejandra Hung  Primary Team: Jeremiah Tovar  Primary Team: Elin Montero  Primary Team: Roosevelt Hood  : Xochilt Bolivar  UR// Supp. Assoc.: Emily Lovelace  UR// Supp. Assoc.: Sofie Haque   CARE PROVIDERS:  Accepting Physician: Jeremiah Tovar  Access Services: Kalen Carlos  Administration: Michel Hull  Administration: Diego Golden  Admitting: Jeremiah Tovar  Attending: Jeremiah Tovar  Case Management: Silviano Gallegos  Consultant: Hilton Gomes  Consultant: Nirmala Rivera  Consultant: Weil, Patricia  Consultant: Dimitri Reed  Consultant: Candace Rodriguez  Consultant: Lukas Mejia  Consultant: Jarett Hendrix  Consultant: Van Rodriguez  Consultant: Shu Patel  Consultant: Jaskaran Vang  Consultant: Christina Gallegos  Covering Team: Hamzah Mata  ED Attending: Roosevelt Brady ED Nurse: Emily Torres  Nurse: Nicole Fulton  Nurse: Cordelia Petersen  Nurse: Radha Wesley  Nurse: Catherine Buitrago  Ordered: Doctor, Unknown  Ordered: Isiah Juárez  Ordered: ServiceAccount, SCMMLM  Outpatient Provider: Khurram House  Outpatient Provider: Dimitri Reed  PCA/Nursing Assistant: Suzanne Monreal  Physical Therapy: Raji Kaur  Primary Team: Jeremiah Tovar  Primary Team: Dexter Carroll  Primary Team: Roosevelt Hood  Primary Team: Zulema Ledesma  Primary Team: Suzanne Garcia  Primary Team: Ghanshyam Peterson  Primary Team: Derek Morales  Primary Team: Janny Greene  Quality Review: Cordelia Pantoja  Registered Dietitian: Yara Hahn  : Lexii Dietrich  Speech Pathology: Con Denson  Speech Pathology: Jennifer Kumar  Student: Karina Downs  Team: [facility] Hospitalists, Team

## 2024-03-19 NOTE — PATIENT PROFILE ADULT - FALL HARM RISK - HARM RISK INTERVENTIONS

## 2024-03-19 NOTE — H&P ADULT - NSHPPHYSICALEXAM_GEN_ALL_CORE
Vital Signs Last 24 Hrs  HR: 84 (19 Mar 2024 08:55) (84 - 84)  BP: 147/84 (19 Mar 2024 08:55) (147/84 - 147/84)  BP(mean): --  RR: 16 (19 Mar 2024 08:55) (16 - 16)  SpO2: 100% (19 Mar 2024 08:55) (100% - 100%)    Parameters below as of 19 Mar 2024 08:55  Patient On (Oxygen Delivery Method): nasal cannula  O2 Flow (L/min): 2    CONSTITUTIONAL: awake, alert, no acute distress  HEENT: Atraumatic normocephalic. Moist mucous membranes.  No conjunctival injection.  RESP: No respiratory distress; CTA b/l, no WRR  CV: RRR, +S1S2, no MRG  GI: Bowel sounds present. Soft, nontender, nondistended, no rebound or guarding  MSK: Moving all four extremities spontaneously. No peripheral edema. No calf tenderness.  SKIN: Warm and dry. No rashes noted.  NEURO: AOx3, answering questions and following commands appropriately  PSYCH: Mood and affect appropriate, recent memory intact Vital Signs Last 24 Hrs  HR: 84 (19 Mar 2024 08:55) (84 - 84)  BP: 147/84 (19 Mar 2024 08:55) (147/84 - 147/84)  BP(mean): --  RR: 16 (19 Mar 2024 08:55) (16 - 16)  SpO2: 100% (19 Mar 2024 08:55) (100% - 100%)    Parameters below as of 19 Mar 2024 08:55  Patient On (Oxygen Delivery Method): nasal cannula  O2 Flow (L/min): 2    CONSTITUTIONAL: awake, alert, no acute distress  HEENT: +mild right sided facial droop, Moist mucous membranes.  No conjunctival injection.  RESP: No respiratory distress; CTA b/l, no WRR  CV: RRR, +S1S2, no MRG  GI: Bowel sounds present. Soft, nontender, nondistended, no rebound or guarding  MSK: Moving all four extremities spontaneously. No peripheral edema. No calf tenderness.  SKIN: Warm and dry. No rashes noted.  NEURO: AOx3, no pronator drift, strength equal and intact in all 4 extremities, sensation intact, +aphasia, able to follow commands appropriately   PSYCH: anxious

## 2024-03-19 NOTE — H&P ADULT - NSHPREVIEWOFSYSTEMS_GEN_ALL_CORE
REVIEW OF SYSTEMS:  CONSTITUTIONAL: No fever, chills or fatigue.  HENMT: No HA, dizziness, rhinorrhea  RESPIRATORY: No cough or shortness of breath.  CARDIOVASCULAR: No chest pain, palpitations.  GASTROINTESTINAL: No abdominal pain. No nausea or vomiting; No diarrhea or constipation. No blood per rectum.  GENITOURINARY: No dysuria, changes in frequency, hematuria, or incontinence  NEUROLOGICAL: Baseline strength. Sensation intact bilaterally.  SKIN: No itching, rashes  MUSCULOSKELETAL: No joint pain or swelling; No muscle, back, or extremity pain

## 2024-03-19 NOTE — H&P ADULT - PROBLEM SELECTOR PLAN 4
- recommend SCDs in setting of recent TNK  - management per ICU DVT Prophylaxis: SCDs in setting of recent TNK    Management per ICU

## 2024-03-19 NOTE — H&P ADULT - ATTENDING COMMENTS
84 yo female with hx RA, macular degeneration, migraine headaches, emphysema, former smoker, recent TIA diagnosed at [facility] on 3/17, presents for word finding difficulty and difficulty speaking. Found to have acute CVA, given TNK. Admit to ICU for further management. Admit to ICU. PT/OT. Treatment will be dependent on clinical course.     Agree with H&P as outlined above, edited where appropriate.

## 2024-03-19 NOTE — H&P ADULT - PROBLEM SELECTOR PLAN 3
- recommend continue home Famotidine 20 mg BID  - management per ICU Chronic  - Continue home Famotidine 20 mg BID  - Management per ICU

## 2024-03-19 NOTE — ED PROVIDER NOTE - NIH STROKE SCALE: 6A. MOTOR LEG, LEFT, QM
(0) No drift; leg holds 30 degree position for full 5 secs
46 yo with PMH of Afib (on Eliquis), CHF with EF of 45%, AICD, thoracic aortic aneurysm, who presents to the ER for Left sided CP with radiation to the left shoulder today. Pain started around 11 am, intermittent in nature, pressure/pain, associated with SOB. Pt denies any N/V/diarrhea/fatigue/cough/leg swelling/fever/rash/calf pain/back pain. States he also feels some pulsations/pumping sensation to the epigastric area. +occ dizziness. No HA/neck pain/dysuria/numbness/weakness/incontinence. Exam ncat, perrl, eomi, lungs ctab, no flank tenderness, heart s1s2 regular, Abdomen obese, no pulsatile mass seen or palpated however body habitus could prohibit visualization of any AAA, +BS, no masses, no rebound/guarding or tenderness, CHEST no chest wall tenderness, Ext no calf tenderness, no edema, Neuro intact. Labs, ekg, CT chest reviewed. No progression of aneursym. First set labs ok, ekg ok. Pt with intermittent but frequent CP. Last cath 2 years ago per Luis. To admit to Tele to r/o ACS. Consult placed for cardio in am.

## 2024-03-19 NOTE — H&P ADULT - HISTORY OF PRESENT ILLNESS
84 yo female with hx RA, former smoker, recent TIA diagnosed at [facility] on __, presents for word finding difficulty and difficulty speaking. She was admitted to [facility] and evaluated for TIA 36 hours ago, but left prior to full workup being done. She was in her usual state of health this morning.  And while driving to her children's home to help them at 745 she had sudden onset of feeling fatigued.  She noticed when she got into the house that she was having trouble with word finding and difficulty speaking.  EMS was called, and patient was brought to the emergency room for evaluation.  Patient provided the entirety of this history, and her speech is returned to almost normal.  She denies any weakness or numbness.  She denies any recent trauma or travel.  She denies any alcohol or drug use.  She has an allergy to penicillin and tramadol   She gets monthly biologic infusions for RA.    In the ED, a code stroke was activated.  She had an NIHSS of 4 for R facial weakness, trace dysarthria, and difficulty with word finding/anomia. Tele stroke was contacted, recommended TNK. See Stroke Code note.    ED COURSE:  Vitals: HR 84, /84, RR 16, SpO2 100 % on 2 L NC  Labs significant for: Cr 0.58, Trop 13.  Imaging:  CT head noncontrast:   New calcific density in the left MCA insula branch may suggest embolic calcific plaque. Mild chronic microvascular changes without evidence of an acute transcortical   infarction or hemorrhage  CTA brain, CTA neck, CT perfusion: No core infarct. Possible brain at risk in left frontal lobe measuring 29 mL. Mild narrowing of the left MCA sylvian branch. No large vessel occlusion. Consider MRI of the brain for further evaluation.  EKG:  Pt received: Tenecteplase 12 g IV 84 yo female with hx RA, former smoker, macular degeneration, migraine headaches, emphysema, recent TIA diagnosed at [facility] on 3/17, presents for word finding difficulty and difficulty speaking. On 3/17 she was brought in by family for episode of approx 1 min 20 sec of expressive aphasia and facial drooping noted by daughter while eating dinner at approx 8:30 pm on 3/16. This completely resolved prior to arrival to [place] ED. NIH stroke scale 0. She was to be admitted for TIA workup, but the pt signed out AMA.  She was in her usual state of health this morning.  And while driving to her children's home to help them at 745 she had sudden onset of feeling fatigued.  She noticed when she got into the house that she was having trouble with word finding and difficulty speaking.  EMS was called, and patient was brought to the emergency room for evaluation.  Patient provided the entirety of this history, and her speech is returned to almost normal.  She denies any weakness or numbness.  She denies any recent trauma or travel.  She denies any alcohol or drug use.  She has an allergy to penicillin and tramadol  She gets monthly biologic infusions for RA.    In the ED, a code stroke was activated.  She had an NIHSS of 4 for R facial weakness, trace dysarthria, and difficulty with word finding/anomia. Tele stroke was contacted, recommended TNK. See Stroke Code note.    ED COURSE:  Vitals: HR 84, /84, RR 16, SpO2 100 % on 2 L NC  Labs significant for: Cr 0.58, Trop 13.  Imaging:  CT head noncontrast:   New calcific density in the left MCA insula branch may suggest embolic calcific plaque. Mild chronic microvascular changes without evidence of an acute transcortical   infarction or hemorrhage  CTA brain, CTA neck, CT perfusion: No core infarct. Possible brain at risk in left frontal lobe measuring 29 mL. Mild narrowing of the left MCA sylvian branch. No large vessel occlusion. Consider MRI of the brain for further evaluation.  EKG:  Pt received: Tenecteplase 12 g IV 84 yo female with hx RA, former smoker, macular degeneration, migraine headaches, emphysema, recent TIA diagnosed at [facility] on 3/17, presents for word finding difficulty and difficulty speaking. On 3/17 she was brought in by family for episode of approx 1 min 20 sec of expressive aphasia and facial drooping noted by daughter while eating dinner at approx 8:30 pm on 3/16. This completely resolved prior to arrival to [place] ED. NIH stroke scale 0. She was to be admitted for TIA workup, but the pt signed out AMA.  She was in her usual state of health this morning.  And while driving to her children's home to help them at 745 she had sudden onset of feeling fatigued.  She noticed when she got into the house that she was having trouble with word finding and difficulty speaking.  EMS was called, and patient was brought to the emergency room for evaluation.  Patient provided the entirety of this history, and her speech is returned to almost normal.  She denies any weakness or numbness.  She denies any recent trauma or travel.  She denies any alcohol or drug use.  She has an allergy to penicillin and tramadol  She gets monthly biologic infusions for RA.    In the ED, a code stroke was activated.  She had an NIHSS of 4 for R facial weakness, trace dysarthria, and difficulty with word finding/anomia. Tele stroke was contacted, recommended TNK. See Stroke Code note.    ED COURSE:  Vitals: HR 84, /84, RR 16, SpO2 100 % on 2 L NC  Labs significant for: Cr 0.58, Trop 13.  Imaging:  CT head noncontrast:   New calcific density in the left MCA insula branch may suggest embolic calcific plaque. Mild chronic microvascular changes without evidence of an acute transcortical infarction or hemorrhage  CTA brain, CTA neck, CT perfusion: No core infarct. Possible brain at risk in left frontal lobe measuring 29 mL. Mild narrowing of the left MCA sylvian branch. No large vessel occlusion. Consider MRI of the brain for further evaluation.  EKG:  Pt received: Tenecteplase 12 g IV Patient is an 84 yo female with hx RA, COPD (former smoker), GERD, macular degeneration, migraine headaches, recent TIA diagnosed at [facility] on 3/17, presents for word finding difficulty and difficulty speaking. On 3/17 she was brought in by family for episode of approx 1 min 20 sec of expressive aphasia and facial drooping noted by daughter while eating dinner. This completely resolved prior to arrival to [facility] [place] ED. NIH stroke scale 0. She was to be admitted for TIA workup, but the pt signed out AMA. She was instructed to f/u with Neuro Dr. Lundberg and Vascular Neurologist Dr. Abraham. Then today, she was in her usual state of health, but while driving to her children's home at 7:45AM, she had sudden onset of feeling fatigued.  She noticed when she got into the house that she was having trouble with word finding and difficulty speaking. This lasted for about 30 minutes. EMS was called, and patient was brought to the emergency room for evaluation.  Patient provided the entirety of this history, and her speech is returned to almost normal. However, as her time in the ED progressed, patients symptoms returned, and she was unable to speak. She had an NIHSS of 4 for R facial weakness, trace dysarthria, and difficulty with word finding/anomia. Code stroke was called, and patient was found to have a calcific density in the left MCA insula branch. She was given TNK, and patient returned back to baseline. Upon exam, patient started to have difficulty finding words again. She was able to follow commands appropriately, strength was equal and intact in all 4 extremities and there was no pronator drift appreciated, However, a mild R facial droop was observed. Prior to aphasia, patient denied any headache, dizziness, blurred vision, chest pain, palpitations, SOB, n/v, dysuria, generalized pain.    ED COURSE:  Vitals: HR 84, /84, RR 16, SpO2 100 % on 2 L NC  Labs significant for: Cr 0.58, Trop 13.  EKbpm, NSR   Given: Tenecteplase 12 g IV    Imaging:  CT head noncontrast:   New calcific density in the left MCA insula branch may suggest embolic calcific plaque. Mild chronic microvascular changes without evidence of an acute transcortical infarction or hemorrhage  CTA brain, CTA neck, CT perfusion: No core infarct. Possible brain at risk in left frontal lobe measuring 29 mL. Mild narrowing of the left MCA sylvian branch. No large vessel occlusion. Consider MRI of the brain for further evaluation.

## 2024-03-19 NOTE — PHARMACOTHERAPY INTERVENTION NOTE - COMMENTS
Patient is an 82 yo female presenting with symptoms of stroke and is determined to be a candidate to receive Tenecteplase. Worked with ED team to dose and prepare Tenecteplase. Bolus of  2.4 mL (5mg/mL) based on scaled weight of 49.3 kg (0.25 mg/kg) was administered by ED Dr. Brady at 9:41 am.

## 2024-03-19 NOTE — ED PROVIDER NOTE - CRITICAL CARE ATTENDING CONTRIBUTION TO CARE
83 female with a history of recent TIA at [facility], presents now with sudden onset of speech/word finding issues at 7:45 AM.  Brought in by EMS.  Symptoms have almost resolved.  Patient has no other localizing symptoms.  Plan of care includes activated code stroke.  Emergent CT imaging.  Patient has a cardiac murmur.  Will obtain an EKG.  To rule out arrhythmia.  Patient will likely need admission to the hospital given her second TIA like event within the last 36 hours.  She is remote former smoker.  She has rheumatoid arthritis and is only on a monthly infusion of a biologic.  Plan of care includes neurology consultation laboratory studies cardiac monitor admission to the telemetry service for further care and evaluation.  Pending CT.  This chart was made with dictation software and may contain typographical errors.  pt had sudden return of speech changes = in accordance with instructions by stroke neurology pt to get tnk  r b a esha pt and family pt wants lytics  lytics administered await call back from interventional stroke

## 2024-03-19 NOTE — ED ADULT NURSE NOTE - BEFAST SPEECH
Sepsis Note   Susana Abraham 37 y.o. male MRN: [id number]  Unit/Bed#: -01 Encounter: [id number]       Initial Sepsis Screening       Row Name 10/30/23 1238                Is the patient's history suggestive of a new or worsening infection? Yes (Proceed)  -MI        Suspected source of infection urinary tract infection  -MI        Indicate SIRS criteria Tachycardia > 90 bpm;Leukocytosis (WBC > 69021 IJL) OR Leukopenia (WBC <4000 IJL) OR Bandemia (WBC >10% bands)  -MI        Are two or more of the above signs & symptoms of infection both present and new to the patient? Yes (Proceed)  -MI        Assess for evidence of organ dysfunction: Are any of the below criteria present within 6 hours of suspected infection and SIRS criteria that are NOT considered to be chronic conditions? --                  User Key  (r) = Recorded By, (t) = Taken By, (c) = Cosigned By      [address] [place] [address] Name Provider Type    MI Xiang Stoddard MD Physician                        Body mass index is 30.04 kg/m². Wt Readings from Last 1 Encounters:   10/30/23 87 kg (191 lb 12.8 oz)     IBW (Ideal Body Weight): 66.1 kg    Ideal body weight: 66.1 kg (145 lb 11.6 oz)  Adjusted ideal body weight: 74.5 kg (164 lb 2.5 oz)      Sepsis alert initiated upon arrival to the floor. But patient already on 2 L bolus, IV antibiotic, blood culture and work-up done in the ER. We will give another liter bolus, after that, continue fluid therapy, trend lactic acid. Yes

## 2024-03-19 NOTE — CONSULT NOTE ADULT - SUBJECTIVE AND OBJECTIVE BOX
CHIEF COMPLAINT:    HPI:       83 yr old female with PMHx of COPD/Emphysema, former smoker, not on Home O2, Rheumatoid arthritis on chronic prednisone therapy and monthly biological infusions, macular degeneration, migraine headaches, s/p Lt spontaneous Pneumothorax requiring pigtail placement 3/2023, recent presentation to [facility] 3/17/24 for TIA after presenting there with expressive aphasia, facial droop. Received CTA at that time that demonstrated 4 mm pseudoaneurysm MCTA. neuro-surg eval stated no acute intervention at that time, however will need outpt follow up. Pt signed AMA at that time.       Pt this a.m. (3/19/24) presented to [facility] via EMS after developing sudden onset of fatigues while driving to daughters home, with eventual word finding/speaking difficulties. EMS called. Pt in E.D. received Code Stroke, TNK (0941) CT head (3/19/24) prior to TNK demonstrated New calcific density in the left MCA insula branch suggestive of embolic calcific plaque. Mild   chronic microvascular changes without evidence of an acute transcortical   infarction or hemorrhage.       Consult and admission to ICU for acute CVA s/p TNK therapy          PAST MEDICAL & SURGICAL HISTORY:  Rheumatoid arthritis      Brain TIA          FAMILY HISTORY:      SOCIAL HISTORY:  Smoking: [ ] Never Smoked [ ] Former Smoker (__ packs x ___ years) [ ] Current Smoker  (__ packs x ___ years)  Substance Use: [ ] Never Used [ ] Used ____  EtOH Use:  Marital Status: [ ] Single [ ]  [ ]  [ ]   Sexual History:   Occupation:  Recent Travel:  Country of Birth:  Advance Directives:    Allergies    No Known Allergies    Intolerances        HOME MEDICATIONS:    REVIEW OF SYSTEMS:            OBJECTIVE:  ICU Vital Signs Last 24 Hrs  T(C): --  T(F): --  HR: 84 (19 Mar 2024 08:55) (84 - 84)  BP: 147/84 (19 Mar 2024 08:55) (147/84 - 147/84)  BP(mean): --  ABP: --  ABP(mean): --  RR: 16 (19 Mar 2024 08:55) (16 - 16)  SpO2: 100% (19 Mar 2024 08:55) (100% - 100%)    O2 Parameters below as of 19 Mar 2024 08:55  Patient On (Oxygen Delivery Method): nasal cannula  O2 Flow (L/min): 2            CAPILLARY BLOOD GLUCOSE  86 (19 Mar 2024 09:38)      POCT Blood Glucose.: 86 mg/dL (19 Mar 2024 08:55)      PHYSICAL EXAM:        HOSPITAL MEDICATIONS:  MEDICATIONS  (STANDING):  tenecteplase Injectable. 12 milliGRAM(s) IV Push. Once    MEDICATIONS  (PRN):      LABS:                        14.1   5.85  )-----------( 250      ( 19 Mar 2024 08:58 )             42.4     Hgb Trend: 14.1<--  03-19    139  |  105  |  11  ----------------------------<  102<H>  4.1   |  27  |  0.58    Ca    8.7      19 Mar 2024 08:58    TPro  7.2  /  Alb  3.5  /  TBili  0.4  /  DBili  x   /  AST  37  /  ALT  32  /  AlkPhos  88  03-19    Creatinine Trend: 0.58<--  PT/INR - ( 19 Mar 2024 08:58 )   PT: 10.6 sec;   INR: 0.90 ratio         PTT - ( 19 Mar 2024 08:58 )  PTT:27.8 sec  Urinalysis Basic - ( 19 Mar 2024 08:58 )    Color: x / Appearance: x / SG: x / pH: x  Gluc: 102 mg/dL / Ketone: x  / Bili: x / Urobili: x   Blood: x / Protein: x / Nitrite: x   Leuk Esterase: x / RBC: x / WBC x   Sq Epi: x / Non Sq Epi: x / Bacteria: x            MICROBIOLOGY:     RADIOLOGY:  [ ] Reviewed and interpreted by me    EKG:        Bassem ANP-BC (ext. 1172)           CHIEF COMPLAINT:    HPI:       83 yr old female with PMHx of COPD/Emphysema, former smoker, not on Home O2, Rheumatoid arthritis on chronic prednisone therapy and monthly  biological infusions, dry macular degeneration, migraine headaches, s/p Lt spontaneous Pneumothorax requiring pigtail placement 3/2023, GERD with recent treatment with "low acid diet" as endorsed by daughter, c/b development of HYPOnatremia tx with Na+ tablets and 10 lb wt lost over one month period, recent Rt L.E. cellulitis tx with antibx therapy (family unable to recall type).  Pt  recent presentation to [facility] 3/17/24 for TIA after presenting there with expressive aphasia, facial droop. Received CTA at that time that demonstrated 4 mm pseudoaneurysm MCTA. neuro-surg eval stated no acute intervention at that time, however will need outpt follow up. Pt signed AMA at that time.       Pt this a.m. (3/19/24) presented to [facility] via EMS after developing sudden onset of fatigues while driving to daughters home, with eventual word finding/speaking difficulties. EMS called. Pt in E.D. received Code Stroke, TNK (0941) CT head (3/19/24) prior to TNK demonstrated New calcific density in the left MCA insula branch suggestive of embolic calcific plaque. Mild   chronic microvascular changes without evidence of an acute transcortical   infarction or hemorrhage.       Consult and admission to ICU for acute CVA s/p TNK therapy          PAST MEDICAL & SURGICAL HISTORY:  Rheumatoid arthritis      Brain TIA          FAMILY HISTORY:      SOCIAL HISTORY:  Smoking: [ ] Never Smoked [ ] Former Smoker (__ packs x ___ years) [ ] Current Smoker  (__ packs x ___ years)  Substance Use: [ ] Never Used [ ] Used ____  EtOH Use:  Marital Status: [ ] Single [ ]  [ ]  [ ]   Sexual History:   Occupation:  Recent Travel:  Country of Birth:  Advance Directives:    Allergies    No Known Allergies    Intolerances        HOME MEDICATIONS:    REVIEW OF SYSTEMS:            OBJECTIVE:  ICU Vital Signs Last 24 Hrs  T(C): --  T(F): --  HR: 84 (19 Mar 2024 08:55) (84 - 84)  BP: 147/84 (19 Mar 2024 08:55) (147/84 - 147/84)  BP(mean): --  ABP: --  ABP(mean): --  RR: 16 (19 Mar 2024 08:55) (16 - 16)  SpO2: 100% (19 Mar 2024 08:55) (100% - 100%)    O2 Parameters below as of 19 Mar 2024 08:55  Patient On (Oxygen Delivery Method): nasal cannula  O2 Flow (L/min): 2            CAPILLARY BLOOD GLUCOSE  86 (19 Mar 2024 09:38)      POCT Blood Glucose.: 86 mg/dL (19 Mar 2024 08:55)      PHYSICAL EXAM:        HOSPITAL MEDICATIONS:  MEDICATIONS  (STANDING):  tenecteplase Injectable. 12 milliGRAM(s) IV Push. Once    MEDICATIONS  (PRN):      LABS:                        14.1   5.85  )-----------( 250      ( 19 Mar 2024 08:58 )             42.4     Hgb Trend: 14.1<--  03-19    139  |  105  |  11  ----------------------------<  102<H>  4.1   |  27  |  0.58    Ca    8.7      19 Mar 2024 08:58    TPro  7.2  /  Alb  3.5  /  TBili  0.4  /  DBili  x   /  AST  37  /  ALT  32  /  AlkPhos  88  03-19    Creatinine Trend: 0.58<--  PT/INR - ( 19 Mar 2024 08:58 )   PT: 10.6 sec;   INR: 0.90 ratio         PTT - ( 19 Mar 2024 08:58 )  PTT:27.8 sec  Urinalysis Basic - ( 19 Mar 2024 08:58 )    Color: x / Appearance: x / SG: x / pH: x  Gluc: 102 mg/dL / Ketone: x  / Bili: x / Urobili: x   Blood: x / Protein: x / Nitrite: x   Leuk Esterase: x / RBC: x / WBC x   Sq Epi: x / Non Sq Epi: x / Bacteria: x            MICROBIOLOGY:     RADIOLOGY:  [ ] Reviewed and interpreted by me    EKG:        Bassem ANP-BC (ext. [id number])           CHIEF COMPLAINT:    HPI:       83 yr old female with PMHx of COPD/Emphysema, former smoker, not on Home O2, Rheumatoid arthritis on chronic prednisone therapy and monthly  biological infusions, dry macular degeneration, migraine headaches, s/p Lt spontaneous Pneumothorax requiring pigtail placement 3/2023, GERD with recent treatment with "low acid diet" as endorsed by daughter, c/b development of HYPOnatremia tx with Na+ tablets and 10 lb wt lost over one month period, recent Rt L.E. cellulitis tx with antibx therapy (family unable to recall type).  Pt  recent presentation to [facility] 3/17/24 for TIA after presenting there with expressive aphasia, facial droop. Received CTA at that time that demonstrated 4 mm pseudoaneurysm MCTA. neuro-surg eval stated no acute intervention at that time, however will need outpt follow up. Pt signed AMA at that time.       Pt this a.m. (3/19/24) presented to \[facility]\"" [facility] via EMS after developing sudden onset of fatigues while driving to daughters home, with eventual word finding/speaking difficulties. EMS called. Pt in E.D. received Code Stroke, TNK (0941) CT head (3/19/24) prior to TNK demonstrated New calcific density in the left MCA insula branch suggestive of embolic calcific plaque. Mild chronic microvascular changes without evidence of an acute transcortical infarction or hemorrhage.       Consult and admission to ICU for acute CVA s/p TNK therapy    Upon consult pt found to be aphasic after improvement as endorsed by [facility]. Pt following commands, shaking head yes and no appropriately to questions, extremities RUE 5/5, LUE 4/5, bilat LE 5/5, slight left facial droop appreciated. requested repeat CT head (3/19/24 1110) that demonstrated mild chronic vascular changes, WITHOUT EVIDENCE OF ACUTE TRANSCORTICAL INFARCTION OR HEMORRHAGE      PAST MEDICAL & SURGICAL HISTORY:  Rheumatoid arthritis      Brain TIA          FAMILY HISTORY:      SOCIAL HISTORY:  Smoking: [ ] Never Smoked [XX ] Former Smoker (__ packs x ___ years) [ ] Current Smoker  (__ packs x ___ years)  Substance Use: [XX ] Never Used [ ] Used ____  EtOH Use:  Marital Status: [ ] Single [ ]  [ ]  [XX ]   Sexual History:   Occupation:  Recent Travel: NONE  Country of Birth:  Advance Directives:    Allergies    No Known Allergies    Intolerances        HOME MEDICATIONS:    REVIEW OF SYSTEMS:            OBJECTIVE:  ICU Vital Signs Last 24 Hrs  T(C): --  T(F): --  HR: 84 (19 Mar 2024 08:55) (84 - 84)  BP: 147/84 (19 Mar 2024 08:55) (147/84 - 147/84)  BP(mean): --  ABP: --  ABP(mean): --  RR: 16 (19 Mar 2024 08:55) (16 - 16)  SpO2: 100% (19 Mar 2024 08:55) (100% - 100%)    O2 Parameters below as of 19 Mar 2024 08:55  Patient On (Oxygen Delivery Method): nasal cannula  O2 Flow (L/min): 2            CAPILLARY BLOOD GLUCOSE  86 (19 Mar 2024 09:38)      POCT Blood Glucose.: 86 mg/dL (19 Mar 2024 08:55)      PHYSICAL EXAM:        HOSPITAL MEDICATIONS:  MEDICATIONS  (STANDING):  tenecteplase Injectable. 12 milliGRAM(s) IV Push. Once    MEDICATIONS  (PRN):      LABS:                        14.1   5.85  )-----------( 250      ( 19 Mar 2024 08:58 )             42.4     Hgb Trend: 14.1<--  03-19    139  |  105  |  11  ----------------------------<  102<H>  4.1   |  27  |  0.58    Ca    8.7      19 Mar 2024 08:58    TPro  7.2  /  Alb  3.5  /  TBili  0.4  /  DBili  x   /  AST  37  /  ALT  32  /  AlkPhos  88  03-19    Creatinine Trend: 0.58<--  PT/INR - ( 19 Mar 2024 08:58 )   PT: 10.6 sec;   INR: 0.90 ratio         PTT - ( 19 Mar 2024 08:58 )  PTT:27.8 sec  Urinalysis Basic - ( 19 Mar 2024 08:58 )    Color: x / Appearance: x / SG: x / pH: x  Gluc: 102 mg/dL / Ketone: x  / Bili: x / Urobili: x   Blood: x / Protein: x / Nitrite: x   Leuk Esterase: x / RBC: x / WBC x   Sq Epi: x / Non Sq Epi: x / Bacteria: x            MICROBIOLOGY:     RADIOLOGY:  [ ] Reviewed and interpreted by me    EKG:        Bassem [facility]-BC (ext. [id number])           CHIEF COMPLAINT:    HPI:       83 yr old female with PMHx of COPD/Emphysema, former smoker, not on Home O2, Rheumatoid arthritis on chronic prednisone therapy and monthly  biological infusions, dry macular degeneration, migraine headaches, s/p Lt spontaneous Pneumothorax requiring pigtail placement 3/2023, GERD with recent treatment with "low acid diet" as endorsed by daughter, c/b development of HYPOnatremia tx with Na+ tablets and 10 lb wt lost over one month period, recent Rt L.E. cellulitis tx with antibx therapy (family unable to recall type).  Pt  recent presentation to [facility] 3/17/24 for TIA after presenting there with expressive aphasia, facial droop. Received CTA at that time that demonstrated 4 mm pseudoaneurysm MCTA. neuro-surg eval stated no acute intervention at that time, however will need outpt follow up. Pt signed AMA at that time on ASA therapy of 325 mg po qd.       Pt this a.m. (3/19/24) presented [facility] after developing sudden onset of fatigues while driving to daughters home, with eventual word finding/speaking difficulties. EMS called. Pt in E.D. received Code Stroke, TNK (0941) CT head (3/19/24) prior to TNK demonstrated New calcific density in the left MCA insula branch suggestive of embolic calcific plaque. Mild chronic microvascular changes without evidence of an acute transcortical infarction or hemorrhage.       Consult and admission to ICU for acute CVA s/p TNK therapy    Upon consult pt found to be aphasic after improvement as endorsed by [facility]. Pt following commands, shaking head yes and no appropriately to questions, extremities RUE 5/5, LUE 4/5, bilat LE 5/5, slight left facial droop appreciated. requested repeat CT head (3/19/24 1110) that demonstrated mild chronic vascular changes, WITHOUT EVIDENCE OF ACUTE TRANSCORTICAL INFARCTION OR HEMORRHAGE      PAST MEDICAL & SURGICAL HISTORY:  Rheumatoid arthritis      Brain TIA          FAMILY HISTORY:      SOCIAL HISTORY:  Smoking: [ ] Never Smoked [XX ] Former Smoker (__ packs x ___ years) [ ] Current Smoker  (__ packs x ___ years)  Substance Use: [XX ] Never Used [ ] Used ____  EtOH Use:  Marital Status: [ ] Single [ ]  [ ]  [XX ]   Sexual History:   Occupation:  Recent Travel: NONE  Country of Birth:  Advance Directives:    Allergies    No Known Allergies    Intolerances        HOME MEDICATIONS:    REVIEW OF SYSTEMS:            OBJECTIVE:  ICU Vital Signs Last 24 Hrs  T(C): --  T(F): --  HR: 84 (19 Mar 2024 08:55) (84 - 84)  BP: 147/84 (19 Mar 2024 08:55) (147/84 - 147/84)  BP(mean): --  ABP: --  ABP(mean): --  RR: 16 (19 Mar 2024 08:55) (16 - 16)  SpO2: 100% (19 Mar 2024 08:55) (100% - 100%)    O2 Parameters below as of 19 Mar 2024 08:55  Patient On (Oxygen Delivery Method): nasal cannula  O2 Flow (L/min): 2            CAPILLARY BLOOD GLUCOSE  86 (19 Mar 2024 09:38)      POCT Blood Glucose.: 86 mg/dL (19 Mar 2024 08:55)      PHYSICAL EXAM:        HOSPITAL MEDICATIONS:  MEDICATIONS  (STANDING):  tenecteplase Injectable. 12 milliGRAM(s) IV Push. Once    MEDICATIONS  (PRN):      LABS:                        14.1   5.85  )-----------( 250      ( 19 Mar 2024 08:58 )             42.4     Hgb Trend: 14.1<--  03-19    139  |  105  |  11  ----------------------------<  102<H>  4.1   |  27  |  0.58    Ca    8.7      19 Mar 2024 08:58    TPro  7.2  /  Alb  3.5  /  TBili  0.4  /  DBili  x   /  AST  37  /  ALT  32  /  AlkPhos  88  03-19    Creatinine Trend: 0.58<--  PT/INR - ( 19 Mar 2024 08:58 )   PT: 10.6 sec;   INR: 0.90 ratio         PTT - ( 19 Mar 2024 08:58 )  PTT:27.8 sec  Urinalysis Basic - ( 19 Mar 2024 08:58 )    Color: x / Appearance: x / SG: x / pH: x  Gluc: 102 mg/dL / Ketone: x  / Bili: x / Urobili: x   Blood: x / Protein: x / Nitrite: x   Leuk Esterase: x / RBC: x / WBC x   Sq Epi: x / Non Sq Epi: x / Bacteria: x            MICROBIOLOGY:     RADIOLOGY:  [ ] Reviewed and interpreted by me    EKG:        Bassem [facility]-BC (ext. 1345)           CHIEF COMPLAINT:    HPI:       83 yr old female with PMHx of COPD/Emphysema, former smoker, not on Home O2, Rheumatoid arthritis on chronic prednisone therapy and monthly  biological infusions (tocilizumab - actemra), dry macular degeneration, migraine headaches, s/p Lt spontaneous Pneumothorax requiring pigtail placement 3/2023, GERD with recent treatment with "low acid diet" as endorsed by daughter, c/b development of HYPOnatremia tx with Na+ tablets and 10 lb wt lost over one month period, recent Rt L.E. cellulitis tx with antibx therapy (family unable to recall type).  Pt  recent presentation to [facility] 3/17/24 for TIA after presenting there with expressive aphasia, facial droop. Received CTA at that time that demonstrated 4 mm pseudoaneurysm MCTA. neuro-surg eval stated no acute intervention at that time, however will need outpt follow up. Pt signed AMA at that time on ASA therapy of 325 mg po qd.       Pt this a.m. (3/19/24) presented to [facility] via EMS after developing sudden onset of fatigues while driving to daughters home, with eventual word finding/speaking difficulties. EMS called. Pt in E.D. received Code Stroke, TNK (0941) CT head (3/19/24) prior to TNK demonstrated New calcific density in the left MCA insula branch suggestive of embolic calcific plaque. Mild chronic microvascular changes without evidence of an acute transcortical infarction or hemorrhage.       Consult and admission to ICU for acute CVA s/p TNK therapy    Upon consult pt found to be aphasic after improvement as endorsed by [facility]. Pt following commands, shaking head yes and no appropriately to questions, extremities RUE 5/5, LUE 4/5, bilat LE 5/5, slight left facial droop appreciated. requested repeat CT head (3/19/24 1110) that demonstrated mild chronic vascular changes, WITHOUT EVIDENCE OF ACUTE TRANSCORTICAL INFARCTION OR HEMORRHAGE      PAST MEDICAL & SURGICAL HISTORY:  Rheumatoid arthritis      Brain TIA          FAMILY HISTORY:      SOCIAL HISTORY:  Smoking: [ ] Never Smoked [XX ] Former Smoker (__ packs x ___ years) [ ] Current Smoker  (__ packs x ___ years)  Substance Use: [XX ] Never Used [ ] Used ____  EtOH Use:  Marital Status: [ ] Single [ ]  [ ]  [XX ]   Sexual History:   Occupation:  Recent Travel: NONE  Country of Birth:  Advance Directives:    Allergies    No Known Allergies    Intolerances        HOME MEDICATIONS:    REVIEW OF SYSTEMS:            OBJECTIVE:  ICU Vital Signs Last 24 Hrs  T(C): --  T(F): --  HR: 84 (19 Mar 2024 08:55) (84 - 84)  BP: 147/84 (19 Mar 2024 08:55) (147/84 - 147/84)  BP(mean): --  ABP: --  ABP(mean): --  RR: 16 (19 Mar 2024 08:55) (16 - 16)  SpO2: 100% (19 Mar 2024 08:55) (100% - 100%)    O2 Parameters below as of 19 Mar 2024 08:55  Patient On (Oxygen Delivery Method): nasal cannula  O2 Flow (L/min): 2            CAPILLARY BLOOD GLUCOSE  86 (19 Mar 2024 09:38)      POCT Blood Glucose.: 86 mg/dL (19 Mar 2024 08:55)      PHYSICAL EXAM:        HOSPITAL MEDICATIONS:  MEDICATIONS  (STANDING):  tenecteplase Injectable. 12 milliGRAM(s) IV Push. Once    MEDICATIONS  (PRN):      LABS:                        14.1   5.85  )-----------( 250      ( 19 Mar 2024 08:58 )             42.4     Hgb Trend: 14.1<--  03-19    139  |  105  |  11  ----------------------------<  102<H>  4.1   |  27  |  0.58    Ca    8.7      19 Mar 2024 08:58    TPro  7.2  /  Alb  3.5  /  TBili  0.4  /  DBili  x   /  AST  37  /  ALT  32  /  AlkPhos  88  03-19    Creatinine Trend: 0.58<--  PT/INR - ( 19 Mar 2024 08:58 )   PT: 10.6 sec;   INR: 0.90 ratio         PTT - ( 19 Mar 2024 08:58 )  PTT:27.8 sec  Urinalysis Basic - ( 19 Mar 2024 08:58 )    Color: x / Appearance: x / SG: x / pH: x  Gluc: 102 mg/dL / Ketone: x  / Bili: x / Urobili: x   Blood: x / Protein: x / Nitrite: x   Leuk Esterase: x / RBC: x / WBC x   Sq Epi: x / Non Sq Epi: x / Bacteria: x            MICROBIOLOGY:     RADIOLOGY:  [ ] Reviewed and interpreted by me    EKG:        Bassem ANP-BC (ext. 2333)           CHIEF COMPLAINT:    HPI:       83 yr old female with PMHx of COPD/Emphysema, former smoker, not on Home O2, Rheumatoid arthritis on chronic prednisone therapy and monthly  biological infusions (tocilizumab - actemra), dry macular degeneration, migraine headaches, s/p Lt spontaneous Pneumothorax requiring pigtail placement 3/2023, GERD with recent treatment with "low acid diet" as endorsed by daughter, c/b development of HYPOnatremia tx with Na+ tablets and 10 lb wt lost over one month period, recent Rt L.E. cellulitis tx with antibx therapy (family unable to recall type).  Pt  recent presentation to [facility] 3/17/24 for TIA after presenting there with expressive aphasia, facial droop. Received CTA at that time that demonstrated 4 mm pseudoaneurysm MCTA. neuro-surg eval stated no acute intervention at that time, however will need outpt follow up. Pt signed AMA at that time on ASA therapy of 325 mg po qd.       Pt this a.m. (3/19/24) presented to [facility] via EMS after developing sudden onset of fatigues while driving to daughters home, with eventual word finding/speaking difficulties. EMS called. Pt in E.D. received Code Stroke, TNK (0941) CT head (3/19/24) prior to TNK demonstrated New calcific density in the left MCA insula branch suggestive of embolic calcific plaque. Mild chronic microvascular changes without evidence of an acute transcortical infarction or hemorrhage.       Consult and admission to ICU for acute CVA s/p TNK therapy    Upon consult pt found to be aphasic after improvement as endorsed by [facility]. Pt following commands, shaking head yes and no appropriately to questions, extremities RUE 5/5, LUE 4/5, bilat LE 5/5, slight left facial droop appreciated. requested repeat CT head (3/19/24 1110) that demonstrated mild chronic vascular changes, WITHOUT EVIDENCE OF ACUTE TRANSCORTICAL INFARCTION OR HEMORRHAGE      PAST MEDICAL & SURGICAL HISTORY:  Rheumatoid arthritis      Brain TIA          FAMILY HISTORY:      SOCIAL HISTORY:  Smoking: [ ] Never Smoked [XX ] Former Smoker (__ packs x ___ years) [ ] Current Smoker  (__ packs x ___ years)  Substance Use: [XX ] Never Used [ ] Used ____  EtOH Use:  Marital Status: [ ] Single [ ]  [ ]  [XX ]   Sexual History:   Occupation:  Recent Travel: NONE  Country of Birth:  Advance Directives:    Allergies    No Known Allergies    Intolerances        HOME MEDICATIONS:    REVIEW OF SYSTEMS:            OBJECTIVE:  ICU Vital Signs Last 24 Hrs  T(C): --  T(F): --  HR: 84 (19 Mar 2024 08:55) (84 - 84)  BP: 147/84 (19 Mar 2024 08:55) (147/84 - 147/84)  BP(mean): --  ABP: --  ABP(mean): --  RR: 16 (19 Mar 2024 08:55) (16 - 16)  SpO2: 100% (19 Mar 2024 08:55) (100% - 100%)    O2 Parameters below as of 19 Mar 2024 08:55  Patient On (Oxygen Delivery Method): nasal cannula  O2 Flow (L/min): 2            CAPILLARY BLOOD GLUCOSE  86 (19 Mar 2024 09:38)      POCT Blood Glucose.: 86 mg/dL (19 Mar 2024 08:55)      PHYSICAL EXAM:        HOSPITAL MEDICATIONS:  MEDICATIONS  (STANDING):  tenecteplase Injectable. 12 milliGRAM(s) IV Push. Once    MEDICATIONS  (PRN):      LABS:                        14.1   5.85  )-----------( 250      ( 19 Mar 2024 08:58 )             42.4     Hgb Trend: 14.1<--  03-19    139  |  105  |  11  ----------------------------<  102<H>  4.1   |  27  |  0.58    Ca    8.7      19 Mar 2024 08:58    TPro  7.2  /  Alb  3.5  /  TBili  0.4  /  DBili  x   /  AST  37  /  ALT  32  /  AlkPhos  88  03-19    Creatinine Trend: 0.58<--  PT/INR - ( 19 Mar 2024 08:58 )   PT: 10.6 sec;   INR: 0.90 ratio         PTT - ( 19 Mar 2024 08:58 )  PTT:27.8 sec  Urinalysis Basic - ( 19 Mar 2024 08:58 )    Color: x / Appearance: x / SG: x / pH: x  Gluc: 102 mg/dL / Ketone: x  / Bili: x / Urobili: x   Blood: x / Protein: x / Nitrite: x   Leuk Esterase: x / RBC: x / WBC x   Sq Epi: x / Non Sq Epi: x / Bacteria: x            MICROBIOLOGY:     RADIOLOGY:  [ ] Reviewed and interpreted by me  < from: MR Head No Cont (03.19.24 @ 16:29) >    FINDINGS:  There are nonspecific T2 prolongation signal abnormalities in the   periventricular subcortical white matter likely related to mild chronic   microvascular ischemic changes.    There is no acute parenchymal hemorrhage, parenchymal mass, mass effect   or midline shift. There is no extra-axial fluid collection.  There is no   hydrocephalus.    Scattered subcentimeter acute/subacute infarcts noted in the left MCA   territory.    Minimal mucosal thickening paranasal sinuses. Retention cyst/polyp noted   in the left maxillary sinus.    IMPRESSION:  Scattered small acute/subacute infarcts in the left MCA territory.    No acute intracranial hemorrhage    --- End of Report ---      < end of copied text >  < from: VA Duplex Carotid, Bilat (03.19.24 @ 14:03) >  Antegrade flow is noted within both vertebral arteries.    IMPRESSION: No significant hemodynamic stenosis of either carotid artery.    Measurement of carotid stenosis is based on velocity parameters that   correlate the residual internal carotid diameter with that of the more   distal vessel in accordance with a method such as the [place] American   Symptomatic Carotid Endarterectomy Trial (NASCET).    --- End of Report ---      < end of copied text >      EKG:        Bassem [facility]-BC (ext. [id number])

## 2024-03-19 NOTE — CONSULT NOTE ADULT - NS PANP COMMENT GEN_ALL_CORE FT
84 yo woman with Hx COPD, spontaneous PTX, RA on prednisone, recent migraines with episode of aphasia on 3/17 and diagnosed with TIA at  but signed out of ED AMA, now presenting with acute onset aphasia, s/p TNK at 9:41 am.  Aphasia symptoms fluctuating this am but ultimately is improving.    --acute CVA with acute/subacute scattered infarcts in L MCA territory  postTNK monitoring per protocol  repeat CTH tomorrow am and start ASA if not bleed  continue statin  permissive htn  check echo, lipids, A1c  PT, OT, speech evas  --permissive htn, at goal  --stable respiratory status  --normal renal function  --NPO per protocol  --no infectious concerns

## 2024-03-19 NOTE — STROKE CODE NOTE - NSMDCONSULT QTN_Y FT
83 year old woman with RA, possible recent TIA, presenting to [facility] ED with language dysfunction.     Patient was driving to family, when she noticed at 0745 that she was having difficulty with speech.  When she arrived at family's house, she was having difficulty with word finding, and speaking clearly.  She denied other neurological complaints, including headache, visual changes, or weakness in the arms and legs.  She was walking normally.     In the ED, a code stroke was activated.  She had an NIHSS of 4 for R facial weakness, trace dysarthria, and difficulty with word finding/anomia.  Her CTH did not show any hemorrhage, or acute infarct, but there was a hyperdense vessel in the L insula.  The CTA showed possible vessel occlusion, and the perfusion showed a 29cc perfusion deficit in the L frontal cortex.      Risks and benefits of tenecteplase were discussed with the patient, together with Dr. Brady in the ED.  Patient and family at bedside understood risks and benefits and agreed to proceed with tenecteplase.     I discussed the imaging findings with interventionists at [facility].  Given the relatively low NIHSS, no mechanical thrombectomy recommended.      At this point recommend post tenecteplase care:   Neuro checks per protocol.    BP goal <180/105.    No antithrombotics for 24h.   ONly SCD's for DVT PPx.   Repeat CTH at 24h post TNK, earlier if worsening symptoms.   Please page or call neurology with worsening symptoms.  May need additional acute intervention.  MRI brain, echo, cardiac tele monitoring.    Formal neurology consultation.

## 2024-03-19 NOTE — ED ADULT TRIAGE NOTE - CHIEF COMPLAINT QUOTE
as per ems, patient was not feeling well today morning at 745am while driving- at 8am- family noticed patient to have abnormal speech- fingerstick- 111, had similar episode of saturday with speech- was seen at [facility] and discharged,

## 2024-03-19 NOTE — ED ADULT NURSE NOTE - CHIEF COMPLAINT QUOTE
as per ems, patient was not feeling well today morning at 745am while driving- at 8am- family noticed patient to have abnormal speech- fingerstick- 111, had similar episode of saturday with speech- was seen at [facility] and discharged,

## 2024-03-19 NOTE — H&P ADULT - ASSESSMENT
84 yo female with hx RA, former smoker, recent TIA diagnosed at [facility] on __, presents for word finding difficulty and difficulty speaking. Found to have acute CVA, given TNK. Admit to ICU for further management. 84 yo female with hx RA, macular degeneration, migraine headaches, emphysema, former smoker, recent TIA diagnosed at [facility] on 3/17, presents for word finding difficulty and difficulty speaking. Found to have acute CVA, given TNK. Admit to ICU for further management.

## 2024-03-19 NOTE — CARE COORDINATION ASSESSMENT. - NSPASTMEDSURGHISTORY_GEN_ALL_CORE_FT
PAST MEDICAL & SURGICAL HISTORY:  Migraine headache      H/O emphysema      Macular degeneration      GERD (gastroesophageal reflux disease)      Brain TIA      Rheumatoid arthritis      S/P cataract surgery      H/O lumpectomy       PAST MEDICAL & SURGICAL HISTORY:  Emphysema of lung  controlled      RA (rheumatoid arthritis)      H/O lumpectomy  2003      Migraine headache      Environmental allergies      Degeneration macular      History of cataract surgery      Migraine headache      H/O emphysema      Macular degeneration      GERD (gastroesophageal reflux disease)      Brain TIA      Rheumatoid arthritis      S/P cataract surgery      H/O lumpectomy

## 2024-03-19 NOTE — ED ADULT NURSE REASSESSMENT NOTE - NS ED NURSE REASSESS COMMENT FT1
Pt noted to have increased difficulty speaking with slurred speech and slight facial asymmetry. (+) PERRLA , pt moving all extremities evenly and equally. Chacho SAEED on telehealth.

## 2024-03-19 NOTE — ED PROVIDER NOTE - CARDIAC, MLM
APPT INFO    Date /Time: 2/1/18 at 11:15 am   Reason for Appt: Hearing loss   Ref Provider/Clinic: Dr. Pippa Arellano   Are there internal records? Yes/No?  IF YES, list clinic names: Yes;  [facility] Neurofibromatosis (Dr. Arellano)   Are there outside records? Yes/No? No   Patient Contact (Y/N) & Call Details: No   Action: Chart Reviewed          Normal rate, regular rhythm.  loud m

## 2024-03-19 NOTE — H&P ADULT - NSHPSOCIALHISTORY_GEN_ALL_CORE
Tobacco: Denies  EtOH: Denies   Recreational drug use: Denies  Lives with:   Ambulates: unassisted   ADLs: able to cook, clean, and shop independently  Occupation: retired Tobacco: Former smoker, quit 20yrs ago   EtOH: Social use   Recreational drug use: Denies  Lives: at home alone, fully functional and independent   Ambulates: unassisted   ADLs: able to cook, clean, and shop independently

## 2024-03-19 NOTE — ED ADULT NURSE REASSESSMENT NOTE - NS ED NURSE REASSESS COMMENT FT1
Pt noted to have slight facial drooping and worsening difficulty speaking. ICU PA at bedside. CT head complete. Pt brought to ICU.

## 2024-03-19 NOTE — H&P ADULT - NSICDXPASTMEDICALHX_GEN_ALL_CORE_FT
PAST MEDICAL HISTORY:  Brain TIA     Rheumatoid arthritis      PAST MEDICAL HISTORY:  Brain TIA     GERD (gastroesophageal reflux disease)     H/O emphysema     Macular degeneration     Migraine headache     Rheumatoid arthritis

## 2024-03-20 LAB
A1C WITH ESTIMATED AVERAGE GLUCOSE RESULT: 5.7 % — HIGH (ref 4–5.6)
ALBUMIN SERPL ELPH-MCNC: 3.1 G/DL — LOW (ref 3.3–5)
ALP SERPL-CCNC: 79 U/L — SIGNIFICANT CHANGE UP (ref 40–120)
ALT FLD-CCNC: 27 U/L — SIGNIFICANT CHANGE UP (ref 12–78)
ANION GAP SERPL CALC-SCNC: 9 MMOL/L — SIGNIFICANT CHANGE UP (ref 5–17)
APTT BLD: 26.1 SEC — SIGNIFICANT CHANGE UP (ref 24.5–35.6)
AST SERPL-CCNC: 33 U/L — SIGNIFICANT CHANGE UP (ref 15–37)
BASOPHILS # BLD AUTO: 0.06 K/UL — SIGNIFICANT CHANGE UP (ref 0–0.2)
BASOPHILS NFR BLD AUTO: 1 % — SIGNIFICANT CHANGE UP (ref 0–2)
BILIRUB SERPL-MCNC: 0.8 MG/DL — SIGNIFICANT CHANGE UP (ref 0.2–1.2)
BUN SERPL-MCNC: 11 MG/DL — SIGNIFICANT CHANGE UP (ref 7–23)
CALCIUM SERPL-MCNC: 8.5 MG/DL — SIGNIFICANT CHANGE UP (ref 8.5–10.1)
CHLORIDE SERPL-SCNC: 106 MMOL/L — SIGNIFICANT CHANGE UP (ref 96–108)
CHOLEST SERPL-MCNC: 213 MG/DL — HIGH
CK MB BLD-MCNC: 2.1 % — SIGNIFICANT CHANGE UP (ref 0–3.5)
CK MB CFR SERPL CALC: 2.9 NG/ML — SIGNIFICANT CHANGE UP (ref 0–3.6)
CK SERPL-CCNC: 138 U/L — SIGNIFICANT CHANGE UP (ref 26–192)
CO2 SERPL-SCNC: 25 MMOL/L — SIGNIFICANT CHANGE UP (ref 22–31)
CREAT SERPL-MCNC: 0.4 MG/DL — LOW (ref 0.5–1.3)
EGFR: 99 ML/MIN/1.73M2 — SIGNIFICANT CHANGE UP
EOSINOPHIL # BLD AUTO: 0.06 K/UL — SIGNIFICANT CHANGE UP (ref 0–0.5)
EOSINOPHIL NFR BLD AUTO: 1 % — SIGNIFICANT CHANGE UP (ref 0–6)
ESTIMATED AVERAGE GLUCOSE: 117 MG/DL — HIGH (ref 68–114)
GLUCOSE SERPL-MCNC: 75 MG/DL — SIGNIFICANT CHANGE UP (ref 70–99)
HCT VFR BLD CALC: 41.2 % — SIGNIFICANT CHANGE UP (ref 34.5–45)
HDLC SERPL-MCNC: 89 MG/DL — SIGNIFICANT CHANGE UP
HGB BLD-MCNC: 13.8 G/DL — SIGNIFICANT CHANGE UP (ref 11.5–15.5)
IMM GRANULOCYTES NFR BLD AUTO: 0.2 % — SIGNIFICANT CHANGE UP (ref 0–0.9)
INR BLD: 0.99 RATIO — SIGNIFICANT CHANGE UP (ref 0.85–1.18)
LIPID PNL WITH DIRECT LDL SERPL: 114 MG/DL — HIGH
LYMPHOCYTES # BLD AUTO: 1.91 K/UL — SIGNIFICANT CHANGE UP (ref 1–3.3)
LYMPHOCYTES # BLD AUTO: 32.2 % — SIGNIFICANT CHANGE UP (ref 13–44)
MAGNESIUM SERPL-MCNC: 2 MG/DL — SIGNIFICANT CHANGE UP (ref 1.6–2.6)
MCHC RBC-ENTMCNC: 31.4 PG — SIGNIFICANT CHANGE UP (ref 27–34)
MCHC RBC-ENTMCNC: 33.5 GM/DL — SIGNIFICANT CHANGE UP (ref 32–36)
MCV RBC AUTO: 93.8 FL — SIGNIFICANT CHANGE UP (ref 80–100)
MONOCYTES # BLD AUTO: 0.56 K/UL — SIGNIFICANT CHANGE UP (ref 0–0.9)
MONOCYTES NFR BLD AUTO: 9.4 % — SIGNIFICANT CHANGE UP (ref 2–14)
NEUTROPHILS # BLD AUTO: 3.33 K/UL — SIGNIFICANT CHANGE UP (ref 1.8–7.4)
NEUTROPHILS NFR BLD AUTO: 56.2 % — SIGNIFICANT CHANGE UP (ref 43–77)
NON HDL CHOLESTEROL: 124 MG/DL — SIGNIFICANT CHANGE UP
NRBC # BLD: 0 /100 WBCS — SIGNIFICANT CHANGE UP (ref 0–0)
PHOSPHATE SERPL-MCNC: 3 MG/DL — SIGNIFICANT CHANGE UP (ref 2.5–4.5)
PLATELET # BLD AUTO: 240 K/UL — SIGNIFICANT CHANGE UP (ref 150–400)
POTASSIUM SERPL-MCNC: 3.5 MMOL/L — SIGNIFICANT CHANGE UP (ref 3.5–5.3)
POTASSIUM SERPL-SCNC: 3.5 MMOL/L — SIGNIFICANT CHANGE UP (ref 3.5–5.3)
PROT SERPL-MCNC: 6.5 G/DL — SIGNIFICANT CHANGE UP (ref 6–8.3)
PROTHROM AB SERPL-ACNC: 11.6 SEC — SIGNIFICANT CHANGE UP (ref 9.5–13)
RBC # BLD: 4.39 M/UL — SIGNIFICANT CHANGE UP (ref 3.8–5.2)
RBC # FLD: 13.4 % — SIGNIFICANT CHANGE UP (ref 10.3–14.5)
SODIUM SERPL-SCNC: 140 MMOL/L — SIGNIFICANT CHANGE UP (ref 135–145)
TRIGL SERPL-MCNC: 54 MG/DL — SIGNIFICANT CHANGE UP
TROPONIN I, HIGH SENSITIVITY RESULT: 17.1 NG/L — SIGNIFICANT CHANGE UP
WBC # BLD: 5.93 K/UL — SIGNIFICANT CHANGE UP (ref 3.8–10.5)
WBC # FLD AUTO: 5.93 K/UL — SIGNIFICANT CHANGE UP (ref 3.8–10.5)

## 2024-03-20 PROCEDURE — 70450 CT HEAD/BRAIN W/O DYE: CPT | Mod: 26

## 2024-03-20 PROCEDURE — 99233 SBSQ HOSP IP/OBS HIGH 50: CPT | Mod: GC

## 2024-03-20 PROCEDURE — 93306 TTE W/DOPPLER COMPLETE: CPT | Mod: 26

## 2024-03-20 PROCEDURE — 99291 CRITICAL CARE FIRST HOUR: CPT

## 2024-03-20 RX ORDER — ASPIRIN/CALCIUM CARB/MAGNESIUM 324 MG
81 TABLET ORAL DAILY
Refills: 0 | Status: DISCONTINUED | OUTPATIENT
Start: 2024-03-20 | End: 2024-03-21

## 2024-03-20 RX ORDER — DEXTROSE 50 % IN WATER 50 %
25 SYRINGE (ML) INTRAVENOUS ONCE
Refills: 0 | Status: COMPLETED | OUTPATIENT
Start: 2024-03-20 | End: 2024-03-20

## 2024-03-20 RX ORDER — GLUCAGON INJECTION, SOLUTION 0.5 MG/.1ML
1 INJECTION, SOLUTION SUBCUTANEOUS ONCE
Refills: 0 | Status: DISCONTINUED | OUTPATIENT
Start: 2024-03-20 | End: 2024-03-21

## 2024-03-20 RX ORDER — ASPIRIN/CALCIUM CARB/MAGNESIUM 324 MG
325 TABLET ORAL DAILY
Refills: 0 | Status: DISCONTINUED | OUTPATIENT
Start: 2024-03-20 | End: 2024-03-20

## 2024-03-20 RX ORDER — CLOPIDOGREL BISULFATE 75 MG/1
75 TABLET, FILM COATED ORAL DAILY
Refills: 0 | Status: DISCONTINUED | OUTPATIENT
Start: 2024-03-20 | End: 2024-03-21

## 2024-03-20 RX ORDER — INSULIN LISPRO 100/ML
VIAL (ML) SUBCUTANEOUS
Refills: 0 | Status: DISCONTINUED | OUTPATIENT
Start: 2024-03-20 | End: 2024-03-21

## 2024-03-20 RX ORDER — INSULIN LISPRO 100/ML
VIAL (ML) SUBCUTANEOUS AT BEDTIME
Refills: 0 | Status: DISCONTINUED | OUTPATIENT
Start: 2024-03-20 | End: 2024-03-21

## 2024-03-20 RX ORDER — FAMOTIDINE 10 MG/ML
20 INJECTION INTRAVENOUS
Refills: 0 | Status: DISCONTINUED | OUTPATIENT
Start: 2024-03-20 | End: 2024-03-21

## 2024-03-20 RX ORDER — SODIUM CHLORIDE 9 MG/ML
1000 INJECTION, SOLUTION INTRAVENOUS
Refills: 0 | Status: DISCONTINUED | OUTPATIENT
Start: 2024-03-20 | End: 2024-03-21

## 2024-03-20 RX ORDER — ENOXAPARIN SODIUM 100 MG/ML
40 INJECTION SUBCUTANEOUS EVERY 24 HOURS
Refills: 0 | Status: DISCONTINUED | OUTPATIENT
Start: 2024-03-20 | End: 2024-03-21

## 2024-03-20 RX ADMIN — FAMOTIDINE 20 MILLIGRAM(S): 10 INJECTION INTRAVENOUS at 18:37

## 2024-03-20 RX ADMIN — CLOPIDOGREL BISULFATE 75 MILLIGRAM(S): 75 TABLET, FILM COATED ORAL at 12:27

## 2024-03-20 RX ADMIN — Medication 25 MILLILITER(S): at 00:13

## 2024-03-20 RX ADMIN — FAMOTIDINE 20 MILLIGRAM(S): 10 INJECTION INTRAVENOUS at 12:27

## 2024-03-20 RX ADMIN — Medication 81 MILLIGRAM(S): at 12:27

## 2024-03-20 RX ADMIN — Medication 40 MILLIGRAM(S): at 05:44

## 2024-03-20 RX ADMIN — ENOXAPARIN SODIUM 40 MILLIGRAM(S): 100 INJECTION SUBCUTANEOUS at 12:27

## 2024-03-20 RX ADMIN — ATORVASTATIN CALCIUM 80 MILLIGRAM(S): 80 TABLET, FILM COATED ORAL at 21:37

## 2024-03-20 RX ADMIN — Medication 25 MILLILITER(S): at 05:44

## 2024-03-20 NOTE — OCCUPATIONAL THERAPY INITIAL EVALUATION ADULT - PERTINENT HX OF CURRENT PROBLEM, REHAB EVAL
82 yo female with hx RA, macular degeneration, migraine headaches, emphysema, former smoker, recent TIA diagnosed at [facility] on 3/17, presents for word finding difficulty and difficulty speaking. Found to have acute CVA, given TNK. Admit to ICU for further management. Admit to ICU. MRI head: "Scattered small acute/subacute infarcts in the left MCA territory."

## 2024-03-20 NOTE — CONSULT NOTE ADULT - CRITICAL CARE ATTENDING COMMENT
Upon my evaluation, this patient is at high risk for imminent or life threatening deterioration due to CVA, TNK   and other active medical issues which require my direct attention, intervention, and personal management.  I have personally spent >30 minutes  of critical care time exclusive of time spent on separate billing procedures. This includes review of laboratory data, radiology results, discussion with primary team\patient, and monitoring for potential decompensation. Interventions were performed as documented above.

## 2024-03-20 NOTE — PROGRESS NOTE ADULT - ASSESSMENT
84 yo female with hx RA, macular degeneration, migraine headaches, emphysema, former smoker, recent TIA diagnosed at  on 3/17, presents for word finding difficulty and difficulty speaking. Found to have acute CVA, given TNK. Admit to ICU for further management.

## 2024-03-20 NOTE — PROGRESS NOTE ADULT - ASSESSMENT
CHARTING IN PROGRESS     Plan:  #Neuro:  ==acute CVA  ==s/p TNK 3/19/24 0941  ==migraine Hx  -Neuro checks q 1 hr and prn for changes  -s/p CT 3/19/24 - (see HPI)  -3/19 MRI brain - subacute scattered L MCA infarcts   -3/19 duplex/Ultrasound of carotid arteries- no significant stenosis   -Repeat Head CT in 24 hrs for f/u or change in mental status  -Plan to initiate ASA and DVT prophylaxis pending repeat CT head repeat today 3/20  -seizure precautions   -Physical/Occupational therapy consult     #Pulm:  ==COPD Hx (no home O2)  ==spontaneous pneumothorax hx  -supplemental O2 to maintain SPO2  94-98%  -HOB >/= 30 degree angle    #CV:  ==Acute CVA  -ecg now and q am   -cardiac enzymes now and q 8 hrs x 3  -3/19 TTE to eval RVFx/LVFx pending read   -3/19 US duplex b/l carotids- no significant stenosis   -Maintain SBP< 185/ DBP<110:  - Labetalol 10 -20 mg IVP q 1 hr prn  -Nicardipene gtt start @ 5mg /hr if unable to control BP with labetalol  -phenylephrine gtt to maintain -170 as needed  -Initiate statin therapy with atorvastatin 80 mg po qhs    #GI/:  ==GERD Hx  -NPO at present  -dysphagia screening prior to p.o intake  -protonix 40 mg IV qd    #I.D.:  ==No issues at present  -currently no need for antibx    #FEN/HEME/ENDO:  ==Rheumatoid Arthritis Hx   ==recent resolved HYPOnatremia (diet induced)  -obtain CMP/Mg++/PO--4/CBC w diff/PT/PTT/INR at 1400 today and q. a.m.  -maintain normothermic - tylenol prn (less then 4gms/24hrs)  -Hold prophylaxis Heparin/Lovenox therapy x 24 hrs post TPA therapy  -s/p TNK therapy 3/19/24 0941  -POC glucose q 6 hrs - maintain glucose 160-180  -will hold any arterial blood draws for 24 hrs  -HgbA1c in a.m.  -Lipid profile/cholesterol level  -on tocilizumab therapy - last treatment 3/4/24             CHARTING IN PROGRESS     Plan:  #Neuro:  ==acute CVA  ==s/p TNK 3/19/24 0941  ==migraine Hx  -Neuro checks q 1 hr and prn for changes  -s/p CT 3/19/24 - (see HPI)  -3/19 MRI brain - subacute scattered L MCA infarcts   -3/19 duplex/Ultrasound of carotid arteries- no significant stenosis   -3/20 repeat CT negative for acute intracranial process   -Initiate DAPT with ASA & Plavix   -Initiate statin therapy   -3/20 swallow eval recommend advance diet   -seizure precautions   -Physical/Occupational therapy consult     #Pulm:  ==COPD Hx (no home O2)  ==spontaneous pneumothorax hx  -supplemental O2 to maintain SPO2  94-98%  -HOB >/= 30 degree angle    #CV:  ==Acute CVA  -ecg now and q am   -cardiac enzymes trending down  -3/19 TTE results (see imaging)   -3/19 US duplex b/l carotids- no significant stenosis   -Maintain SBP< 185/ DBP<110:  - Labetalol 10 -20 mg IVP q 1 hr prn  -Nicardipene gtt start @ 5mg /hr if unable to control BP with labetalol  -phenylephrine gtt to maintain -170 as needed  -Initiate statin therapy with atorvastatin 80 mg po qhs    #GI/:  ==GERD Hx  -3/20 dysphagia screen okay to advance diet   -DASH diet   -protonix 40 mg IV qd    #I.D.:  ==No issues at present  -currently no need for antibx    #FEN/HEME/ENDO:  ==Rheumatoid Arthritis Hx   ==recent resolved HYPOnatremia (diet induced)  -maintain normothermic - tylenol prn (less then 4gms/24hrs)  -Initiate DVT prophylaxis with lovenox   -s/p TNK therapy 3/19/24 0941  -POC glucose q 6 hrs - maintain glucose 160-180  -will hold any arterial blood draws for 24 hrs  -3/20 HgbA1c 5.7   -Hypelipidemia Cholesterol 213,   -on tocilizumab therapy - last treatment 3/4/24

## 2024-03-20 NOTE — PHARMACOTHERAPY INTERVENTION NOTE - COMMENTS
·	On ppi once daily for gerd/gi ppx.  Patient takes famotidine 20mg BID at home and would prefer home therapy per RN.  D/w [facility].  Order was changed to famotidine PO BID per medication reconciliation  ·	Was on prednisone 2mg/d prior to admission for rheumatoid arthritis.  Able to take PO medications, now.  2mg prednisone daily entered by [facility] per Dr. Garcia during rounds

## 2024-03-20 NOTE — OCCUPATIONAL THERAPY INITIAL EVALUATION ADULT - PATIENT PROFILE REVIEW, REHAB EVAL
Controlled Substance Refill Request for Oxycodone 5 mg  Problem List Complete:  No     PROVIDER TO CONSIDER COMPLETION OF PROBLEM LIST AND OVERVIEW/CONTROLLED SUBSTANCE AGREEMENT    Last Written Prescription Date:  1/3/2018  Last Fill Quantity: 30,   # refills: 0    Last Office Visit with [facility] primary care provider: 12/13/2017    Future Office visit:     Controlled substance agreement on file: No.     Processing:  Patient will  in clinic   checked in past 6 months?  [facility]  Call when ready      check 02/02/2018 no concerns   yes

## 2024-03-20 NOTE — PROGRESS NOTE ADULT - ATTENDING COMMENTS
84 yo woman with Hx COPD, spontaneous PTX, RA on prednisone, recent migraines with episode TIA on 3/17 and now presenting with 3/19 with acute onset aphasia, s/p TNK.  MRI confirms acute and subacute L MCA infarcts.    --acute CVA with acute/subacute scattered infarcts in L MCA territory, much improved  start ASA and plavix this am  high dose statin, hyperlipidemia noted  BP at acceptable level  PT, OT  passed swallow eval  --echo with moderate AI, mod-sev TR, with normal RV, unclear etology  check echo with bubble  BP acceptable  --stable respiratory status  --normal renal function  --PO diet  --RA on tocilizumab, contiinue home prednisone 2mg  --no infectious concerns.  --start lovenox ppx  --discussed with neuro  --discussed with pt and daughter

## 2024-03-20 NOTE — PHYSICAL THERAPY INITIAL EVALUATION ADULT - ADDITIONAL COMMENTS
Patient reports living in private home alone with no stairs to negotiate. Patient reports being independent with no assistive device with all functional mobility and ADLS. Patient reports owning a single axis cane.

## 2024-03-20 NOTE — CONSULT NOTE ADULT - SUBJECTIVE AND OBJECTIVE BOX
Patient is a 83y old  Female who presents with a chief complaint of CVA (20 Mar 2024 17:59)      HPI:  Patient is an 84 yo female with hx RA, COPD (former smoker), GERD, macular degeneration, migraine headaches, recent TIA diagnosed at [facility] on 3/17, presents for word finding difficulty and difficulty speaking. On 3/17 she was brought in by family for episode of approx 1 min 20 sec of expressive aphasia and facial drooping noted by daughter while eating dinner. This completely resolved prior to arrival to [place] ED. NIH stroke scale 0. She was to be admitted for TIA workup, but the pt signed out AMA. She was instructed to f/u with Neuro Dr. Lundberg and Vascular Neurologist Dr. Abraham. Then today, she was in her usual state of health, but while driving to her children's home at 7:45AM, she had sudden onset of feeling fatigued.  She noticed when she got into the house that she was having trouble with word finding and difficulty speaking. This lasted for about 30 minutes. EMS was called, and patient was brought to the emergency room for evaluation.  Patient provided the entirety of this history, and her speech is returned to almost normal. However, as her time in the ED progressed, patients symptoms returned, and she was unable to speak. She had an NIHSS of 4 for R facial weakness, trace dysarthria, and difficulty with word finding/anomia. Code stroke was called, and patient was found to have a calcific density in the left MCA insula branch. She was given TNK, and patient returned back to baseline. Upon exam, patient started to have difficulty finding words again. She was able to follow commands appropriately, strength was equal and intact in all 4 extremities and there was no pronator drift appreciated, However, a mild R facial droop was observed. Prior to aphasia, patient denied any headache, dizziness, blurred vision, chest pain, palpitations, SOB, n/v, dysuria, generalized pain.    ED COURSE:  Vitals: HR 84, /84, RR 16, SpO2 100 % on 2 L NC  Labs significant for: Cr 0.58, Trop 13.  EKbpm, NSR   Given: Tenecteplase 12 g IV    Imaging:  CT head noncontrast:   New calcific density in the left MCA insula branch may suggest embolic calcific plaque. Mild chronic microvascular changes without evidence of an acute transcortical infarction or hemorrhage  CTA brain, CTA neck, CT perfusion: No core infarct. Possible brain at risk in left frontal lobe measuring 29 mL. Mild narrowing of the left MCA sylvian branch. No large vessel occlusion. Consider MRI of the brain for further evaluation. (19 Mar 2024 10:05)    Pt reports she does not follow with a cardiologist and has no history of known arrythmia/atrial fibrillation, heart failure, HTN, or CAD. Pt saw a cardiologist 20 years ago for one episode of palpitations, had worn a monitor which did not reveal anything. Pt has not experienced any episodes of palpitations since then. HPI as above, of note pt was given ASA after leaving  however did not take it due to her GERD. Pt had recent worsening of her GERD symptoms for which she was placed on a low salt diet which then dropped her sodium to 120 per her daughter and pt was having difficulty walking, however this has since resolved. At time of exam pt is feeling better, still has some residual speech deficits. She denies dizziness, lightheadedness, chest pain ,SOB, palpitations, AP, n/v.      PAST MEDICAL & SURGICAL HISTORY:  RA (rheumatoid arthritis)      Emphysema of lung  controlled      Degeneration macular      Environmental allergies      Migraine headache      Rheumatoid arthritis      Brain TIA      GERD (gastroesophageal reflux disease)      Macular degeneration      H/O emphysema      Migraine headache      H/O lumpectomy  2003      History of cataract surgery      H/O lumpectomy      S/P cataract surgery                ECHO  FINDINGS:      MEDICATIONS  (STANDING):  aspirin  chewable 81 milliGRAM(s) Oral daily  atorvastatin 80 milliGRAM(s) Oral at bedtime  clopidogrel Tablet 75 milliGRAM(s) Oral daily  enoxaparin Injectable 40 milliGRAM(s) SubCutaneous every 24 hours  famotidine    Tablet 20 milliGRAM(s) Oral two times a day  insulin lispro (ADMELOG) corrective regimen sliding scale   SubCutaneous every 6 hours    MEDICATIONS  (PRN):      FAMILY HISTORY:  Family history of CVA  mother    Family history of CVA (Mother)      Denies Family history of CAD or early MI    ROS:  Constitutional: denies fever, chills  HEENT: denies blurry vision  Respiratory: denies SOB, GURROLA, cough  Cardiovascular: denies CP, palpitations, orthopnea, PND, LE edema  Gastrointestinal: denies nausea, vomiting, abdominal pain  Neurologic: +speech difficulty, denies headache, dizziness      SOCIAL HISTORY:    No tobacco, Alcohol or Ddrug use    Vital Signs Last 24 Hrs  T(C): 36.6 (20 Mar 2024 11:55), Max: 36.7 (20 Mar 2024 04:10)  T(F): 97.8 (20 Mar 2024 11:55), Max: 98 (20 Mar 2024 04:10)  HR: 92 (20 Mar 2024 18:00) (68 - 115)  BP: 132/76 (20 Mar 2024 18:00) (107/71 - 174/82)  BP(mean): 99 (20 Mar 2024 18:00) (82 - 118)  RR: 34 (20 Mar 2024 18:00) (11 - 44)  SpO2: 96% (20 Mar 2024 18:00) (96% - 100%)    Parameters below as of 19 Mar 2024 20:00  Patient On (Oxygen Delivery Method): room air        Physical Exam:  General: NAD, resting comfortably in bed  HEENT: NCAT, EOMI  Neurology: +minimal word finding difficulty, strength equal b/l UE and LE, answers questions and follows commands appropriately  Respiratory: CTA B/L, No W/R/R  CV: RRR, +S1/S2, no murmurs, rubs or gallops  Abdominal: Soft, NT, ND  Extremities: No C/C/E      ECG:    I&O's Detail    19 Mar 2024 07:01  -  20 Mar 2024 07:00  --------------------------------------------------------  IN:  Total IN: 0 mL    OUT:    Voided (mL): 650 mL  Total OUT: 650 mL    Total NET: -650 mL          LABS:                        13.8   5.93  )-----------( 240      ( 20 Mar 2024 05:20 )             41.2     03-20    140  |  106  |  11  ----------------------------<  75  3.5   |  25  |  0.40<L>    Ca    8.5      20 Mar 2024 05:20  Phos  3.0     03-20  Mg     2.0     03-20    TPro  6.5  /  Alb  3.1<L>  /  TBili  0.8  /  DBili  x   /  AST  33  /  ALT  27  /  AlkPhos  79  03-20    CARDIAC MARKERS ( 20 Mar 2024 05:20 )  x     / x     / 138 U/L / x     / 2.9 ng/mL  CARDIAC MARKERS ( 19 Mar 2024 14:10 )  x     / x     / 152 U/L / x     / 4.6 ng/mL  CARDIAC MARKERS ( 19 Mar 2024 08:58 )  x     / x     / 157 U/L / x     / 5.5 ng/mL      PT/INR - ( 20 Mar 2024 05:20 )   PT: 11.6 sec;   INR: 0.99 ratio         PTT - ( 20 Mar 2024 05:20 )  PTT:26.1 sec  Urinalysis Basic - ( 20 Mar 2024 05:20 )    Color: x / Appearance: x / SG: x / pH: x  Gluc: 75 mg/dL / Ketone: x  / Bili: x / Urobili: x   Blood: x / Protein: x / Nitrite: x   Leuk Esterase: x / RBC: x / WBC x   Sq Epi: x / Non Sq Epi: x / Bacteria: x      I&O's Summary    19 Mar 2024 07:01  -  20 Mar 2024 07:00  --------------------------------------------------------  IN: 0 mL / OUT: 650 mL / NET: -650 mL      BNP  RADIOLOGY & ADDITIONAL STUDIES: Patient is a 83y old  Female who presents with a chief complaint of CVA (20 Mar 2024 17:59)      HPI:  Patient is an 82 yo female with hx RA, COPD (former smoker), GERD, macular degeneration, migraine headaches, recent TIA diagnosed at [facility] on 3/17, presents for word finding difficulty and difficulty speaking. On 3/17 she was brought in by family for episode of approx 1 min 20 sec of expressive aphasia and facial drooping noted by daughter while eating dinner. This completely resolved prior to arrival to [place] ED. NIH stroke scale 0. She was to be admitted for TIA workup, but the pt signed out AMA. She was instructed to f/u with Neuro Dr. Lundberg and Vascular Neurologist Dr. Abraham. Then today, she was in her usual state of health, but while driving to her children's home at 7:45AM, she had sudden onset of feeling fatigued.  She noticed when she got into the house that she was having trouble with word finding and difficulty speaking. This lasted for about 30 minutes. EMS was called, and patient was brought to the emergency room for evaluation.  Patient provided the entirety of this history, and her speech is returned to almost normal. However, as her time in the ED progressed, patients symptoms returned, and she was unable to speak. She had an NIHSS of 4 for R facial weakness, trace dysarthria, and difficulty with word finding/anomia. Code stroke was called, and patient was found to have a calcific density in the left MCA insula branch. She was given TNK, and patient returned back to baseline. Upon exam, patient started to have difficulty finding words again. She was able to follow commands appropriately, strength was equal and intact in all 4 extremities and there was no pronator drift appreciated, However, a mild R facial droop was observed. Prior to aphasia, patient denied any headache, dizziness, blurred vision, chest pain, palpitations, SOB, n/v, dysuria, generalized pain.    ED COURSE:  Vitals: HR 84, /84, RR 16, SpO2 100 % on 2 L NC  Labs significant for: Cr 0.58, Trop 13.  EKbpm, NSR   Given: Tenecteplase 12 g IV    Imaging:  CT head noncontrast:   New calcific density in the left MCA insula branch may suggest embolic calcific plaque. Mild chronic microvascular changes without evidence of an acute transcortical infarction or hemorrhage  CTA brain, CTA neck, CT perfusion: No core infarct. Possible brain at risk in left frontal lobe measuring 29 mL. Mild narrowing of the left MCA sylvian branch. No large vessel occlusion. Consider MRI of the brain for further evaluation. (19 Mar 2024 10:05)    Pt reports she does not follow with a cardiologist and has no history of known arrythmia/atrial fibrillation, heart failure, HTN, or CAD. Pt saw a cardiologist 20 years ago for one episode of palpitations, had worn a monitor which did not reveal anything. Pt has not experienced any episodes of palpitations since then. HPI as above, of note pt was given ASA after leaving  however did not take it due to her GERD. Pt had recent worsening of her GERD symptoms for which she was placed on a low salt diet which then dropped her sodium to 120 per her daughter and pt was having difficulty walking, however this has since resolved. At time of exam pt is feeling better, still has some residual speech deficits. She denies dizziness, lightheadedness, chest pain ,SOB, palpitations, AP, n/v.      PAST MEDICAL & SURGICAL HISTORY:  RA (rheumatoid arthritis)      Emphysema of lung  controlled      Degeneration macular      Environmental allergies      Migraine headache      Rheumatoid arthritis      Brain TIA      GERD (gastroesophageal reflux disease)      Macular degeneration      H/O emphysema      Migraine headache      H/O lumpectomy  2003      History of cataract surgery      H/O lumpectomy      S/P cataract surgery                ECHO  FINDINGS:      MEDICATIONS  (STANDING):  aspirin  chewable 81 milliGRAM(s) Oral daily  atorvastatin 80 milliGRAM(s) Oral at bedtime  clopidogrel Tablet 75 milliGRAM(s) Oral daily  enoxaparin Injectable 40 milliGRAM(s) SubCutaneous every 24 hours  famotidine    Tablet 20 milliGRAM(s) Oral two times a day  insulin lispro (ADMELOG) corrective regimen sliding scale   SubCutaneous every 6 hours    MEDICATIONS  (PRN):      FAMILY HISTORY:  Family history of CVA  mother    Family history of CVA (Mother)      Denies Family history of CAD or early MI    ROS:  as listed otherwise neg      SOCIAL HISTORY:    No tobacco, Alcohol or Ddrug use    Vital Signs Last 24 Hrs  T(C): 36.6 (20 Mar 2024 11:55), Max: 36.7 (20 Mar 2024 04:10)  T(F): 97.8 (20 Mar 2024 11:55), Max: 98 (20 Mar 2024 04:10)  HR: 92 (20 Mar 2024 18:00) (68 - 115)  BP: 132/76 (20 Mar 2024 18:00) (107/71 - 174/82)  BP(mean): 99 (20 Mar 2024 18:00) (82 - 118)  RR: 34 (20 Mar 2024 18:00) (11 - 44)  SpO2: 96% (20 Mar 2024 18:00) (96% - 100%)    Parameters below as of 19 Mar 2024 20:00  Patient On (Oxygen Delivery Method): room air        Physical Exam:  General: NAD, resting comfortably in bed  HEENT: NCAT, EOMI  Neurology: +minimal word finding difficulty, strength equal b/l UE and LE, answers questions and follows commands appropriately  Respiratory: CTA B/L, No W/R/R  CV: RRR, +S1/S2, + SM   Abdominal: Soft, NT, ND  Extremities: No C/C/E  psych appropriate mood and affect       ECG:    I&O's Detail    19 Mar 2024 07:01  -  20 Mar 2024 07:00  --------------------------------------------------------  IN:  Total IN: 0 mL    OUT:    Voided (mL): 650 mL  Total OUT: 650 mL    Total NET: -650 mL          LABS:                        13.8   5.93  )-----------( 240      ( 20 Mar 2024 05:20 )             41.2     03-20    140  |  106  |  11  ----------------------------<  75  3.5   |  25  |  0.40<L>    Ca    8.5      20 Mar 2024 05:20  Phos  3.0     03-20  Mg     2.0     03-20    TPro  6.5  /  Alb  3.1<L>  /  TBili  0.8  /  DBili  x   /  AST  33  /  ALT  27  /  AlkPhos  79  03-20    CARDIAC MARKERS ( 20 Mar 2024 05:20 )  x     / x     / 138 U/L / x     / 2.9 ng/mL  CARDIAC MARKERS ( 19 Mar 2024 14:10 )  x     / x     / 152 U/L / x     / 4.6 ng/mL  CARDIAC MARKERS ( 19 Mar 2024 08:58 )  x     / x     / 157 U/L / x     / 5.5 ng/mL      PT/INR - ( 20 Mar 2024 05:20 )   PT: 11.6 sec;   INR: 0.99 ratio         PTT - ( 20 Mar 2024 05:20 )  PTT:26.1 sec  Urinalysis Basic - ( 20 Mar 2024 05:20 )    Color: x / Appearance: x / SG: x / pH: x  Gluc: 75 mg/dL / Ketone: x  / Bili: x / Urobili: x   Blood: x / Protein: x / Nitrite: x   Leuk Esterase: x / RBC: x / WBC x   Sq Epi: x / Non Sq Epi: x / Bacteria: x      I&O's Summary    19 Mar 2024 07:01  -  20 Mar 2024 07:00  --------------------------------------------------------  IN: 0 mL / OUT: 650 mL / NET: -650 mL      BNP  RADIOLOGY & ADDITIONAL STUDIES:

## 2024-03-20 NOTE — OCCUPATIONAL THERAPY INITIAL EVALUATION ADULT - GENERAL OBSERVATIONS, REHAB EVAL
Pt received seated on commode (+) tele, NAD, hand-off from TITA Rivera. Pt agrees to participate with OT for eval and kirby well. Pt with intermittent expressive aphasia but otherwise symptoms appear resolved.

## 2024-03-20 NOTE — CONSULT NOTE ADULT - ASSESSMENT
Patient is an 84 yo female with hx RA, COPD (former smoker), GERD, macular degeneration, migraine headaches, recent TIA diagnosed at [facility] on 3/17, presents for word finding difficulty and difficulty speaking, admitted for CVA.    #CVA  - Pt admitted and found to have infarcts in the L MCA territory  - s/p TNK with improvement in symptoms, residual speech deficit present  - EKG NSR @ 79bpm  - c/w asa and plavix, statin  - tte with EF 55-60%, RA mildly dilated, mild MR, mod AR, mod-severe TR  - would recommend tte limited with bubble study  - hx of one episode of palpitations 20 years ago, wore monitor however was negative at that time  - recommend outpatient cardiology follow up for possible loop recorder to assess for presence of arrythmia and need for AC    - troponin neg, no clear evidence of acute ischemia  - BP well controlled, monitor routine hemodynamics.  - Monitor and replete lytes, keep K>4, Mg>2.    - Other cardiovascular workup will depend on clinical course.  - All other workup per primary team.  - Will continue to follow.             82 yo female with hx RA, COPD (former smoker), GERD, macular degeneration, migraine headaches, recent TIA diagnosed at [facility] on 3/17, presents for word finding difficulty and difficulty speaking, admitted for CVA.    #CVA  - Pt admitted and found to have infarcts in the L MCA territory  - s/p TNK with improvement in symptoms, residual speech deficit present  - EKG NSR @ 79bpm  - c/w asa and plavix, statin  - tte with EF 55-60%, RA mildly dilated, mild MR, mod AR, mod-severe TR  - would recommend tte limited with bubble study  - hx of one episode of palpitations 20 years ago, wore monitor however was negative at that time  - recommend outpatient cardiology follow up for possible loop recorder to assess for presence of arrythmia and need for AC    - troponin neg, no clear evidence of acute ischemia  - BP well controlled, monitor routine hemodynamics.  - Monitor and replete lytes, keep K>4, Mg>2.    - Other cardiovascular workup will depend on clinical course.  - All other workup per primary team.  - Will continue to follow.

## 2024-03-20 NOTE — PROGRESS NOTE ADULT - SUBJECTIVE AND OBJECTIVE BOX
Patient is a 83y old  Female who presents with a chief complaint of CVA (19 Mar 2024 10:14)    24 hour events: ***    REVIEW OF SYSTEMS    Neuro: No headache, numbness, weakness  Resp: No cough, wheezing, shortness of breath  CVS: No chest pain, palpitations, leg swelling  GI: No abdominal pain, nausea, vomiting, diarrhea   Heme: No bleeding    T(F): 97.3 (03-20-24 @ 08:06), Max: 98 (03-20-24 @ 04:10)  HR: 96 (03-20-24 @ 08:00) (68 - 110)  BP: 174/82 (03-20-24 @ 08:00) (118/63 - 174/82)  RR: 25 (03-20-24 @ 08:00) (11 - 44)  SpO2: 100% (03-20-24 @ 08:00) (96% - 100%)  Wt(kg): --            I&O's Summary    03-19 @ 07:01  -  03-20 @ 07:00  --------------------------------------------------------  IN: 0 mL / OUT: 650 mL / NET: -650 mL      PHYSICAL EXAM  General:   CNS:   HEENT:   Resp:   CVS:   Abd:   Ext:   Skin:     MEDICATIONS      atorvastatin Oral  hydrocortisone sodium succinate Injectable IV Push  insulin lispro (ADMELOG) corrective regimen sliding scale SubCutaneous            pantoprazole  Injectable IV Push                                    13.8   5.93  )-----------( 240      ( 20 Mar 2024 05:20 )             41.2       03-20    140  |  106  |  11  ----------------------------<  75  3.5   |  25  |  0.40<L>    Ca    8.5      20 Mar 2024 05:20  Phos  3.0     03-20  Mg     2.0     03-20    TPro  6.5  /  Alb  3.1<L>  /  TBili  0.8  /  DBili  x   /  AST  33  /  ALT  27  /  AlkPhos  79  03-20      CARDIAC MARKERS ( 20 Mar 2024 05:20 )  x     / x     / 138 U/L / x     / 2.9 ng/mL  CARDIAC MARKERS ( 19 Mar 2024 14:10 )  x     / x     / 152 U/L / x     / 4.6 ng/mL  CARDIAC MARKERS ( 19 Mar 2024 08:58 )  x     / x     / 157 U/L / x     / 5.5 ng/mL      PT/INR - ( 20 Mar 2024 05:20 )   PT: 11.6 sec;   INR: 0.99 ratio         PTT - ( 20 Mar 2024 05:20 )  PTT:26.1 sec  Urinalysis Basic - ( 20 Mar 2024 05:20 )    Color: x / Appearance: x / SG: x / pH: x  Gluc: 75 mg/dL / Ketone: x  / Bili: x / Urobili: x   Blood: x / Protein: x / Nitrite: x   Leuk Esterase: x / RBC: x / WBC x   Sq Epi: x / Non Sq Epi: x / Bacteria: x            Radiology: ***  Bedside lung ultrasound: ***  Bedside ECHO: ***    CENTRAL LINE: Y/N          DATE INSERTED:              REMOVE: Y/N  GALDAMEZ: Y/N                        DATE INSERTED:              REMOVE: Y/N  A-LINE: Y/N                       DATE INSERTED:              REMOVE: Y/N    GLOBAL ISSUE/BEST PRACTICE  Analgesia: N  Sedation: N  HOB elevation: yes  Stress ulcer prophylaxis: Y  VTE prophylaxis: N  Glycemic control: Y  Nutrition: NPO    CODE STATUS: full code  GOC discussion: Y       Patient is a 83y old  Female who presents with a chief complaint of CVA (19 Mar 2024 10:14)    24 hour events: ***    REVIEW OF SYSTEMS    Neuro: No headache, numbness, weakness  Resp: No cough, wheezing, shortness of breath  CVS: No chest pain, palpitations, leg swelling  GI: No abdominal pain, nausea, vomiting, diarrhea   Heme: No bleeding    T(F): 97.3 (03-20-24 @ 08:06), Max: 98 (03-20-24 @ 04:10)  HR: 96 (03-20-24 @ 08:00) (68 - 110)  BP: 174/82 (03-20-24 @ 08:00) (118/63 - 174/82)  RR: 25 (03-20-24 @ 08:00) (11 - 44)  SpO2: 100% (03-20-24 @ 08:00) (96% - 100%)  Wt(kg): --            I&O's Summary    03-19 @ 07:01  -  03-20 @ 07:00  --------------------------------------------------------  IN: 0 mL / OUT: 650 mL / NET: -650 mL      PHYSICAL EXAM  General: generally well appearing   CNS: A&O x3, mild dysarthria, strength equal and intact in all 4 extremities, sensation equal and intact in all 4 extremities, able to follow commands appropriately   HEENT: EDEL, EOMI    Resp: CTAB, no labored breathing   CVS: RRR, no mmrgs, cap refil <2s, no pitting edema   Ext: strength equal and intact in all 4 extremities, sensation equal and intact in all 4 extremities       MEDICATIONS      atorvastatin Oral  hydrocortisone sodium succinate Injectable IV Push  insulin lispro (ADMELOG) corrective regimen sliding scale SubCutaneous            pantoprazole  Injectable IV Push                                    13.8   5.93  )-----------( 240      ( 20 Mar 2024 05:20 )             41.2       03-20    140  |  106  |  11  ----------------------------<  75  3.5   |  25  |  0.40<L>    Ca    8.5      20 Mar 2024 05:20  Phos  3.0     03-20  Mg     2.0     03-20    TPro  6.5  /  Alb  3.1<L>  /  TBili  0.8  /  DBili  x   /  AST  33  /  ALT  27  /  AlkPhos  79  03-20      CARDIAC MARKERS ( 20 Mar 2024 05:20 )  x     / x     / 138 U/L / x     / 2.9 ng/mL  CARDIAC MARKERS ( 19 Mar 2024 14:10 )  x     / x     / 152 U/L / x     / 4.6 ng/mL  CARDIAC MARKERS ( 19 Mar 2024 08:58 )  x     / x     / 157 U/L / x     / 5.5 ng/mL      PT/INR - ( 20 Mar 2024 05:20 )   PT: 11.6 sec;   INR: 0.99 ratio         PTT - ( 20 Mar 2024 05:20 )  PTT:26.1 sec  Urinalysis Basic - ( 20 Mar 2024 05:20 )    Color: x / Appearance: x / SG: x / pH: x  Gluc: 75 mg/dL / Ketone: x  / Bili: x / Urobili: x   Blood: x / Protein: x / Nitrite: x   Leuk Esterase: x / RBC: x / WBC x   Sq Epi: x / Non Sq Epi: x / Bacteria: x            Radiology: ***  Bedside lung ultrasound: ***  Bedside ECHO: ***    CENTRAL LINE: Y/N          DATE INSERTED:              REMOVE: Y/N  GALDAMEZ: Y/N                        DATE INSERTED:              REMOVE: Y/N  A-LINE: Y/N                       DATE INSERTED:              REMOVE: Y/N    GLOBAL ISSUE/BEST PRACTICE  Analgesia: N  Sedation: N  HOB elevation: yes  Stress ulcer prophylaxis: Y  VTE prophylaxis: N  Glycemic control: Y  Nutrition: NPO    CODE STATUS: full code  GOC discussion: Y       Patient is a 83y old  Female who presents with a chief complaint of CVA (19 Mar 2024 10:14)    24 hour events:   Pt presented on 3/19/24 to [facility] after developing sudden onset of fatigues while driving to daughters home, with eventual word finding/speaking difficulties. EMS called. Pt in [facility] received Code Stroke, TNK (0941) CT head (3/19/24) prior to TNK demonstrated New calcific density in the left MCA insula branch suggestive of embolic calcific plaque. Mild chronic microvascular changes without evidence of an acute transcortical infarction or hemorrhage. Consult and admission to ICU for acute CVA s/p TNK therapy. Upon consult pt found to be aphasic after improvement as endorsed by CHRISTEL. Pt following commands, shaking head yes and no appropriately to questions, extremities RUE 5/5, LUE 4/5, bilat LE 5/5, slight left facial droop appreciated. requested repeat CT head (3/19/24 1110) that demonstrated mild chronic vascular changes, WITHOUT EVIDENCE OF ACUTE TRANSCORTICAL INFARCTION OR HEMORRHAGE    Pt seen at bedside this am, states she is feeling better than yesterday. Endorses difficulty with speech but is able to carry conversation well and responds to commands. She denies any chest pain, shortness of breath, headaches, numbness at this time and would like to know when she can eat.     REVIEW OF SYSTEMS    Neuro: No headache, numbness, weakness  Resp: No cough, wheezing, shortness of breath  CVS: No chest pain, palpitations, leg swelling  GI: No abdominal pain, nausea, vomiting, diarrhea   Heme: No bleeding    T(F): 97.3 (03-20-24 @ 08:06), Max: 98 (03-20-24 @ 04:10)  HR: 96 (03-20-24 @ 08:00) (68 - 110)  BP: 174/82 (03-20-24 @ 08:00) (118/63 - 174/82)  RR: 25 (03-20-24 @ 08:00) (11 - 44)  SpO2: 100% (03-20-24 @ 08:00) (96% - 100%)  Wt(kg): --            I&O's Summary    03-19 @ 07:01  -  03-20 @ 07:00  --------------------------------------------------------  IN: 0 mL / OUT: 650 mL / NET: -650 mL      PHYSICAL EXAM  General: generally well appearing   CNS: A&O x3, mild dysarthria, strength equal and intact in all 4 extremities, sensation equal and intact in all 4 extremities, able to follow commands appropriately   HEENT: EDEL, EOMI    Resp: CTAB, no labored breathing   CVS: RRR, no mmrgs, cap refil <2s, no pitting edema   Ext: strength equal and intact in all 4 extremities, sensation equal and intact in all 4 extremities       MEDICATIONS      atorvastatin Oral  hydrocortisone sodium succinate Injectable IV Push  insulin lispro (ADMELOG) corrective regimen sliding scale SubCutaneous            pantoprazole  Injectable IV Push                                    13.8   5.93  )-----------( 240      ( 20 Mar 2024 05:20 )             41.2       03-20    140  |  106  |  11  ----------------------------<  75  3.5   |  25  |  0.40<L>    Ca    8.5      20 Mar 2024 05:20  Phos  3.0     03-20  Mg     2.0     03-20    TPro  6.5  /  Alb  3.1<L>  /  TBili  0.8  /  DBili  x   /  AST  33  /  ALT  27  /  AlkPhos  79  03-20      CARDIAC MARKERS ( 20 Mar 2024 05:20 )  x     / x     / 138 U/L / x     / 2.9 ng/mL  CARDIAC MARKERS ( 19 Mar 2024 14:10 )  x     / x     / 152 U/L / x     / 4.6 ng/mL  CARDIAC MARKERS ( 19 Mar 2024 08:58 )  x     / x     / 157 U/L / x     / 5.5 ng/mL      PT/INR - ( 20 Mar 2024 05:20 )   PT: 11.6 sec;   INR: 0.99 ratio         PTT - ( 20 Mar 2024 05:20 )  PTT:26.1 sec  Urinalysis Basic - ( 20 Mar 2024 05:20 )    Color: x / Appearance: x / SG: x / pH: x  Gluc: 75 mg/dL / Ketone: x  / Bili: x / Urobili: x   Blood: x / Protein: x / Nitrite: x   Leuk Esterase: x / RBC: x / WBC x   Sq Epi: x / Non Sq Epi: x / Bacteria: x            Radiology: ***  Bedside lung ultrasound: ***  Bedside ECHO: ***    CENTRAL LINE: Y/N          DATE INSERTED:              REMOVE: Y/N  GALDAMEZ: Y/N                        DATE INSERTED:              REMOVE: Y/N  A-LINE: Y/N                       DATE INSERTED:              REMOVE: Y/N    GLOBAL ISSUE/BEST PRACTICE  Analgesia: N  Sedation: N  HOB elevation: yes  Stress ulcer prophylaxis: Y  VTE prophylaxis: N  Glycemic control: Y  Nutrition: NPO    CODE STATUS: full code  GOC discussion: Y       Patient is a 83y old  Female who presents with a chief complaint of CVA (19 Mar 2024 10:14)    24 hour events:   Pt presented on 3/19/24 to [facility] via EMS after developing sudden onset of fatigues while driving to daughters home, with eventual word finding/speaking difficulties. EMS called. Pt in [facility] received Code Stroke, TNK (0941) CT head (3/19/24) prior to TNK demonstrated New calcific density in the left MCA insula branch suggestive of embolic calcific plaque. Mild chronic microvascular changes without evidence of an acute transcortical infarction or hemorrhage. Consult and admission to ICU for acute CVA s/p TNK therapy. Upon consult pt found to be aphasic after improvement as endorsed by CHRISTEL. Pt following commands, shaking head yes and no appropriately to questions, extremities RUE 5/5, LUE 4/5, bilat LE 5/5, slight left facial droop appreciated. requested repeat CT head (3/19/24 1110) that demonstrated mild chronic vascular changes, WITHOUT EVIDENCE OF ACUTE TRANSCORTICAL INFARCTION OR HEMORRHAGE    Pt seen at bedside this am, states she is feeling better than yesterday. Endorses difficulty with speech but is able to carry conversation well and responds to commands. She denies any chest pain, shortness of breath, headaches, numbness at this time and would like to know when she can eat.     REVIEW OF SYSTEMS    Neuro: No headache, numbness, weakness  Resp: No cough, wheezing, shortness of breath  CVS: No chest pain, palpitations, leg swelling  GI: No abdominal pain, nausea, vomiting, diarrhea   Heme: No bleeding    T(F): 97.3 (03-20-24 @ 08:06), Max: 98 (03-20-24 @ 04:10)  HR: 96 (03-20-24 @ 08:00) (68 - 110)  BP: 174/82 (03-20-24 @ 08:00) (118/63 - 174/82)  RR: 25 (03-20-24 @ 08:00) (11 - 44)  SpO2: 100% (03-20-24 @ 08:00) (96% - 100%)  Wt(kg): --            I&O's Summary    03-19 @ 07:01  -  03-20 @ 07:00  --------------------------------------------------------  IN: 0 mL / OUT: 650 mL / NET: -650 mL      PHYSICAL EXAM  General: generally well appearing   CNS: A&O x3, mild dysarthria, strength equal and intact in all 4 extremities, sensation equal and intact in all 4 extremities, able to follow commands appropriately   HEENT: EDEL, EOMI    Resp: CTAB, no labored breathing   CVS: RRR, no mmrgs, cap refil <2s, no pitting edema   Ext: strength equal and intact in all 4 extremities, sensation equal and intact in all 4 extremities       MEDICATIONS      atorvastatin Oral  hydrocortisone sodium succinate Injectable IV Push  insulin lispro (ADMELOG) corrective regimen sliding scale SubCutaneous            pantoprazole  Injectable IV Push                                    13.8   5.93  )-----------( 240      ( 20 Mar 2024 05:20 )             41.2       03-20    140  |  106  |  11  ----------------------------<  75  3.5   |  25  |  0.40<L>    Ca    8.5      20 Mar 2024 05:20  Phos  3.0     03-20  Mg     2.0     03-20    TPro  6.5  /  Alb  3.1<L>  /  TBili  0.8  /  DBili  x   /  AST  33  /  ALT  27  /  AlkPhos  79  03-20      CARDIAC MARKERS ( 20 Mar 2024 05:20 )  x     / x     / 138 U/L / x     / 2.9 ng/mL  CARDIAC MARKERS ( 19 Mar 2024 14:10 )  x     / x     / 152 U/L / x     / 4.6 ng/mL  CARDIAC MARKERS ( 19 Mar 2024 08:58 )  x     / x     / 157 U/L / x     / 5.5 ng/mL      PT/INR - ( 20 Mar 2024 05:20 )   PT: 11.6 sec;   INR: 0.99 ratio         PTT - ( 20 Mar 2024 05:20 )  PTT:26.1 sec  Urinalysis Basic - ( 20 Mar 2024 05:20 )    Color: x / Appearance: x / SG: x / pH: x  Gluc: 75 mg/dL / Ketone: x  / Bili: x / Urobili: x   Blood: x / Protein: x / Nitrite: x   Leuk Esterase: x / RBC: x / WBC x   Sq Epi: x / Non Sq Epi: x / Bacteria: x        < from: CT Head No Cont (03.20.24 @ 11:21) >  IMPRESSION: No acute intracranial hemorrhage.    Previously seen small acute/subacute infarcts within the left MCA   distribution were better seen on the prior MRI exam.      --- End of Report ---    < end of copied text >  < from: MR Head No Cont (03.19.24 @ 16:29) >  FINDINGS:  There are nonspecific T2 prolongation signal abnormalities in the   periventricular subcortical white matter likely related to mild chronic   microvascular ischemic changes.    There is no acute parenchymal hemorrhage, parenchymal mass, mass effect   or midline shift. There is no extra-axial fluid collection.  There is no   hydrocephalus.    Scattered subcentimeter acute/subacute infarcts noted in the left MCA   territory.    Minimal mucosal thickening paranasal sinuses. Retention cyst/polyp noted   in the left maxillary sinus.    IMPRESSION:  Scattered small acute/subacute infarcts in the left MCA territory.    No acute intracranial hemorrhage    --- End of Report ---    < end of copied text >  < from: VA Duplex Carotid, Bilat (03.19.24 @ 14:03) >    IMPRESSION: No significant hemodynamic stenosis of either carotid artery.    Measurement of carotid stenosis is based on velocity parameters that   correlate the residual internal carotid diameter with that of the more   distal vessel in accordance with a method such as the [place] American   Symptomatic Carotid Endarterectomy Trial (NASCET).    --- End of Report ---    < end of copied text >      CENTRAL LINE: Y/N          DATE INSERTED:              REMOVE: Y/N  GALDAMEZ: Y/N                        DATE INSERTED:              REMOVE: Y/N  A-LINE: Y/N                       DATE INSERTED:              REMOVE: Y/N    GLOBAL ISSUE/BEST PRACTICE  Analgesia: N  Sedation: N  HOB elevation: yes  Stress ulcer prophylaxis: Y  VTE prophylaxis: N  Glycemic control: Y  Nutrition: DASH    CODE STATUS: full code  GO discussion: Y       Patient is a 83y old  Female who presents with a chief complaint of CVA (19 Mar 2024 10:14)    24 hour events:   Pt presented on 3/19/24 to [facility] via [facility] after developing sudden onset of fatigues while driving to daughters home, with eventual word finding/speaking difficulties. EMS called. Pt in [facility] received Code Stroke, TNK (0941) CT head (3/19/24) prior to TNK demonstrated New calcific density in the left MCA insula branch suggestive of embolic calcific plaque. Mild chronic microvascular changes without evidence of an acute transcortical infarction or hemorrhage. Consult and admission to ICU for acute CVA s/p TNK therapy. Upon consult pt found to be aphasic after improvement as endorsed by CHRISTEL. Pt following commands, shaking head yes and no appropriately to questions, extremities RUE 5/5, LUE 4/5, bilat LE 5/5, slight left facial droop appreciated. requested repeat CT head (3/19/24 1110) that demonstrated mild chronic vascular changes, WITHOUT EVIDENCE OF ACUTE TRANSCORTICAL INFARCTION OR HEMORRHAGE    Pt seen at bedside this am, states she is feeling better than yesterday. Endorses difficulty with speech/ word finding but is able to carry conversation well and responds to commands. She denies any chest pain, shortness of breath, headaches, numbness at this time and would like to know when she can eat.     REVIEW OF SYSTEMS    Neuro: No headache, numbness, weakness  Resp: No cough, wheezing, shortness of breath  CVS: No chest pain, palpitations, leg swelling  GI: No abdominal pain, nausea, vomiting, diarrhea   Heme: No bleeding    T(F): 97.3 (03-20-24 @ 08:06), Max: 98 (03-20-24 @ 04:10)  HR: 96 (03-20-24 @ 08:00) (68 - 110)  BP: 174/82 (03-20-24 @ 08:00) (118/63 - 174/82)  RR: 25 (03-20-24 @ 08:00) (11 - 44)  SpO2: 100% (03-20-24 @ 08:00) (96% - 100%)  Wt(kg): --            I&O's Summary    03-19 @ 07:01  -  03-20 @ 07:00  --------------------------------------------------------  IN: 0 mL / OUT: 650 mL / NET: -650 mL      PHYSICAL EXAM  General: generally well appearing   CNS: A&O x3, speech is fluid, strength equal and intact in all 4 extremities, sensation equal and intact in all 4 extremities, able to follow commands appropriately   HEENT: EDEL, EOMI    Resp: CTAB, no labored breathing   CVS: RRR, no mmrgs, cap refil <2s, no pitting edema   Ext: strength equal and intact in all 4 extremities, sensation equal and intact in all 4 extremities       MEDICATIONS      atorvastatin Oral  hydrocortisone sodium succinate Injectable IV Push  insulin lispro (ADMELOG) corrective regimen sliding scale SubCutaneous            pantoprazole  Injectable IV Push                                    13.8   5.93  )-----------( 240      ( 20 Mar 2024 05:20 )             41.2       03-20    140  |  106  |  11  ----------------------------<  75  3.5   |  25  |  0.40<L>    Ca    8.5      20 Mar 2024 05:20  Phos  3.0     03-20  Mg     2.0     03-20    TPro  6.5  /  Alb  3.1<L>  /  TBili  0.8  /  DBili  x   /  AST  33  /  ALT  27  /  AlkPhos  79  03-20      CARDIAC MARKERS ( 20 Mar 2024 05:20 )  x     / x     / 138 U/L / x     / 2.9 ng/mL  CARDIAC MARKERS ( 19 Mar 2024 14:10 )  x     / x     / 152 U/L / x     / 4.6 ng/mL  CARDIAC MARKERS ( 19 Mar 2024 08:58 )  x     / x     / 157 U/L / x     / 5.5 ng/mL      PT/INR - ( 20 Mar 2024 05:20 )   PT: 11.6 sec;   INR: 0.99 ratio         PTT - ( 20 Mar 2024 05:20 )  PTT:26.1 sec  Urinalysis Basic - ( 20 Mar 2024 05:20 )    Color: x / Appearance: x / SG: x / pH: x  Gluc: 75 mg/dL / Ketone: x  / Bili: x / Urobili: x   Blood: x / Protein: x / Nitrite: x   Leuk Esterase: x / RBC: x / WBC x   Sq Epi: x / Non Sq Epi: x / Bacteria: x        < from: CT Head No Cont (03.20.24 @ 11:21) >  IMPRESSION: No acute intracranial hemorrhage.    Previously seen small acute/subacute infarcts within the left MCA   distribution were better seen on the prior MRI exam.      --- End of Report ---    < end of copied text >  < from: MR Head No Cont (03.19.24 @ 16:29) >  FINDINGS:  There are nonspecific T2 prolongation signal abnormalities in the   periventricular subcortical white matter likely related to mild chronic   microvascular ischemic changes.    There is no acute parenchymal hemorrhage, parenchymal mass, mass effect   or midline shift. There is no extra-axial fluid collection.  There is no   hydrocephalus.    Scattered subcentimeter acute/subacute infarcts noted in the left MCA   territory.    Minimal mucosal thickening paranasal sinuses. Retention cyst/polyp noted   in the left maxillary sinus.    IMPRESSION:  Scattered small acute/subacute infarcts in the left MCA territory.    No acute intracranial hemorrhage    --- End of Report ---    < end of copied text >  < from: VA Duplex Carotid, Bilat (03.19.24 @ 14:03) >    IMPRESSION: No significant hemodynamic stenosis of either carotid artery.    Measurement of carotid stenosis is based on velocity parameters that   correlate the residual internal carotid diameter with that of the more   distal vessel in accordance with a method such as the [place] American   Symptomatic Carotid Endarterectomy Trial (NASCET).    --- End of Report ---    < end of copied text >      CENTRAL LINE: Y/N          DATE INSERTED:              REMOVE: Y/N  GALDAMEZ: Y/N                        DATE INSERTED:              REMOVE: Y/N  A-LINE: Y/N                       DATE INSERTED:              REMOVE: Y/N    GLOBAL ISSUE/BEST PRACTICE  Analgesia: N  Sedation: N  HOB elevation: yes  Stress ulcer prophylaxis: Y  VTE prophylaxis: N  Glycemic control: Y  Nutrition: DASH    CODE STATUS: full code  GOC discussion: Y       Patient is a 83y old  Female who presents with a chief complaint of CVA (19 Mar 2024 10:14)    24 hour events:   Pt presented on 3/19/24 to [facility] after developing sudden onset of fatigues while driving to daughters home, with eventual word finding/speaking difficulties. EMS called. Pt in [facility] received Code Stroke, TNK (0941) CT head (3/19/24) prior to TNK demonstrated New calcific density in the left MCA insula branch suggestive of embolic calcific plaque. Mild chronic microvascular changes without evidence of an acute transcortical infarction or hemorrhage. Consult and admission to ICU for acute CVA s/p TNK therapy. Upon consult pt found to be aphasic after improvement as endorsed by CHRISTEL. Pt following commands, shaking head yes and no appropriately to questions, extremities RUE 5/5, LUE 4/5, bilat LE 5/5, slight left facial droop appreciated. requested repeat CT head (3/19/24 1110) that demonstrated mild chronic vascular changes, WITHOUT EVIDENCE OF ACUTE TRANSCORTICAL INFARCTION OR HEMORRHAGE    Pt seen at bedside this am, states she is feeling better than yesterday. Endorses difficulty with speech/ word finding but is able to carry conversation well and responds to commands. She denies any chest pain, shortness of breath, headaches, numbness at this time and would like to know when she can eat.     REVIEW OF SYSTEMS    Neuro: No headache, numbness, weakness  Resp: No cough, wheezing, shortness of breath  CVS: No chest pain, palpitations, leg swelling  GI: No abdominal pain, nausea, vomiting, diarrhea   Heme: No bleeding    T(F): 97.3 (03-20-24 @ 08:06), Max: 98 (03-20-24 @ 04:10)  HR: 96 (03-20-24 @ 08:00) (68 - 110)  BP: 174/82 (03-20-24 @ 08:00) (118/63 - 174/82)  RR: 25 (03-20-24 @ 08:00) (11 - 44)  SpO2: 100% (03-20-24 @ 08:00) (96% - 100%)  Wt(kg): --            I&O's Summary    03-19 @ 07:01  -  03-20 @ 07:00  --------------------------------------------------------  IN: 0 mL / OUT: 650 mL / NET: -650 mL      PHYSICAL EXAM  General: generally well appearing   CNS: A&O x3, speech is fluid, strength equal and intact in all 4 extremities, sensation equal and intact in all 4 extremities, able to follow commands appropriately   HEENT: EDEL, EOMI    Resp: CTAB, no labored breathing   CVS: RRR, no mmrgs, cap refil <2s, no pitting edema   Ext: strength equal and intact in all 4 extremities, sensation equal and intact in all 4 extremities       MEDICATIONS      atorvastatin Oral  hydrocortisone sodium succinate Injectable IV Push  insulin lispro (ADMELOG) corrective regimen sliding scale SubCutaneous            pantoprazole  Injectable IV Push                                    13.8   5.93  )-----------( 240      ( 20 Mar 2024 05:20 )             41.2       03-20    140  |  106  |  11  ----------------------------<  75  3.5   |  25  |  0.40<L>    Ca    8.5      20 Mar 2024 05:20  Phos  3.0     03-20  Mg     2.0     03-20    TPro  6.5  /  Alb  3.1<L>  /  TBili  0.8  /  DBili  x   /  AST  33  /  ALT  27  /  AlkPhos  79  03-20      CARDIAC MARKERS ( 20 Mar 2024 05:20 )  x     / x     / 138 U/L / x     / 2.9 ng/mL  CARDIAC MARKERS ( 19 Mar 2024 14:10 )  x     / x     / 152 U/L / x     / 4.6 ng/mL  CARDIAC MARKERS ( 19 Mar 2024 08:58 )  x     / x     / 157 U/L / x     / 5.5 ng/mL      PT/INR - ( 20 Mar 2024 05:20 )   PT: 11.6 sec;   INR: 0.99 ratio         PTT - ( 20 Mar 2024 05:20 )  PTT:26.1 sec  Urinalysis Basic - ( 20 Mar 2024 05:20 )    Color: x / Appearance: x / SG: x / pH: x  Gluc: 75 mg/dL / Ketone: x  / Bili: x / Urobili: x   Blood: x / Protein: x / Nitrite: x   Leuk Esterase: x / RBC: x / WBC x   Sq Epi: x / Non Sq Epi: x / Bacteria: x        < from: CT Head No Cont (03.20.24 @ 11:21) >  IMPRESSION: No acute intracranial hemorrhage.    Previously seen small acute/subacute infarcts within the left MCA   distribution were better seen on the prior MRI exam.      --- End of Report ---    < end of copied text >  < from: MR Head No Cont (03.19.24 @ 16:29) >  FINDINGS:  There are nonspecific T2 prolongation signal abnormalities in the   periventricular subcortical white matter likely related to mild chronic   microvascular ischemic changes.    There is no acute parenchymal hemorrhage, parenchymal mass, mass effect   or midline shift. There is no extra-axial fluid collection.  There is no   hydrocephalus.    Scattered subcentimeter acute/subacute infarcts noted in the left MCA   territory.    Minimal mucosal thickening paranasal sinuses. Retention cyst/polyp noted   in the left maxillary sinus.    IMPRESSION:  Scattered small acute/subacute infarcts in the left MCA territory.    No acute intracranial hemorrhage    --- End of Report ---    < end of copied text >  < from: VA Duplex Carotid, Bilat (03.19.24 @ 14:03) >    IMPRESSION: No significant hemodynamic stenosis of either carotid artery.    Measurement of carotid stenosis is based on velocity parameters that   correlate the residual internal carotid diameter with that of the more   distal vessel in accordance with a method such as the North American   Symptomatic Carotid Endarterectomy Trial (NASCET).    --- End of Report ---    < end of copied text >    < from: TTE W or WO Ultrasound Enhancing Agent (03.19.24 @ 20:02) >  CONCLUSIONS:      1. Technically difficult image quality.   2. Left ventricular systolic function is normal with an ejection fraction visually estimated at 55 to 60 %.   3. Normal right ventricular cavity size and normal systolic function.   4. The right atrium is mildly dilated.   5. There is focal calcification of the aortic valve leaflets. There is mild thickening of the aortic valve leaflets.   6. Thickened mitral valve leaflets.   7. Mild mitral regurgitation.   8. Moderate aortic regurgitation.   9. Moderate to severe tricuspid regurgitation.  10. The inferior vena cava is normal in size measuring 1.84 cm in diameter, (normal <2.1cm) with normal inspiratory collapse (normal >50%) consistent with normal right atrial pressure (~3, range 0-5mmHg).  11. Estimated pulmonary artery systolic pressure is 35 mmHg.    < end of copied text >      CENTRAL LINE: Y/N          DATE INSERTED:              REMOVE: Y/N  GALDAMEZ: Y/N                        DATE INSERTED:              REMOVE: Y/N  A-LINE: Y/N                       DATE INSERTED:              REMOVE: Y/N    GLOBAL ISSUE/BEST PRACTICE  Analgesia: N  Sedation: N  HOB elevation: yes  Stress ulcer prophylaxis: Y  VTE prophylaxis: N  Glycemic control: Y  Nutrition: DASH    CODE STATUS: full code

## 2024-03-20 NOTE — OCCUPATIONAL THERAPY INITIAL EVALUATION ADULT - ADDITIONAL COMMENTS
Pt lives alone in a home, no steps. Bathroom has a tub. PTA she was independent with ADLs/IADLs and functional mobility without AD. (+) . Pt reports she has strong family support. Pt owns a cane.

## 2024-03-20 NOTE — OCCUPATIONAL THERAPY INITIAL EVALUATION ADULT - RANGE OF MOTION EXAMINATION, UPPER EXTREMITY
FMC/GMC intact and BUE sensation grossly intact to light touch./bilateral UE Active ROM was WFL  (within functional limits)

## 2024-03-20 NOTE — PROGRESS NOTE ADULT - TIME BILLING
84 yo female with hx RA, macular degeneration, migraine headaches, emphysema, former smoker, recent TIA diagnosed at [facility] on 3/17, presents for word finding difficulty and difficulty speaking. Found to have acute CVA, given TNK. Admit to ICU for further management. 84 yo female with hx RA, macular degeneration, migraine headaches, emphysema, former smoker, recent TIA diagnosed at [facility] on 3/17, presents for word finding difficulty and difficulty speaking. Found to have acute CVA,  s/p   TNK.

## 2024-03-20 NOTE — SWALLOW BEDSIDE ASSESSMENT ADULT - COMMENTS
Per charting, 82 yo female with hx RA, macular degeneration, migraine headaches, emphysema, former smoker, recent TIA diagnosed at [facility] on 3/17, presents for word finding difficulty and difficulty speaking. Found to have acute CVA, given TNK. Admit to ICU for further management.    CT Head: "Scattered subcentimeter acute/subacute infarcts noted in the left MCA territory."    CXR: "Diffuse moderate chronic interstitial lung disease."    Patient was received awake, alert and cooperative. Daughter at bedside. Patient presents with expressive language deficits marked by word finding difficulty. Unable to complete formal speech-language evaluation at this time as transport arrived to bring pt to cat scan following clinical swallow evaluation.

## 2024-03-20 NOTE — SWALLOW BEDSIDE ASSESSMENT ADULT - SWALLOW EVAL: RECOMMENDED FEEDING/EATING TECHNIQUES
slow rate of intake/maintain upright posture during/after eating for 30 mins/position upright (90 degrees)/small sips/bites

## 2024-03-20 NOTE — PROGRESS NOTE ADULT - SUBJECTIVE AND OBJECTIVE BOX
Patient is a 83y old  Female who presents with a chief complaint of CVA (20 Mar 2024 08:10)      INTERVAL HPI/OVERNIGHT EVENTS:     T(C): 36.6 (03-20-24 @ 11:55), Max: 36.7 (03-20-24 @ 04:10)  HR: 106 (03-20-24 @ 13:00) (68 - 110)  BP: 144/74 (03-20-24 @ 13:00) (118/63 - 174/82)  RR: 39 (03-20-24 @ 13:00) (11 - 44)  SpO2: 98% (03-20-24 @ 13:00) (96% - 100%)  Wt(kg): --  I&O's Summary    19 Mar 2024 07:01  -  20 Mar 2024 07:00  --------------------------------------------------------  IN: 0 mL / OUT: 650 mL / NET: -650 mL        REVIEW OF SYSTEMS:  CONSTITUTIONAL: No fever, weight loss, or fatigue  EYES: No eye pain, visual disturbances, or discharge  ENMT:  No difficulty hearing, tinnitus, vertigo; No sinus or throat pain  NECK: No pain or stiffness  BREASTS: No pain, no masses  RESPIRATORY: No cough, wheezing, chills or hemoptysis; No shortness of breath  CARDIOVASCULAR: No chest pain, palpitations, dizziness, or leg swelling  GASTROINTESTINAL: No abdominal or epigastric pain. No nausea, vomiting, or hematemesis; No diarrhea or constipation. No melena or hematochezia.  GENITOURINARY: No dysuria, frequency, hematuria, or incontinence  NEUROLOGICAL: No headaches, memory loss, loss of strength, numbness, or tremors  SKIN: No itching, burning, rashes, or lesions   MUSCULOSKELETAL: No joint pain or swelling; No muscle, back, or extremity pain    PHYSICAL EXAM:  GENERAL: NAD, well-groomed, well-developed  HEAD:  Atraumatic, Normocephalic  EYES: EOMI, PERRLA, conjunctiva and sclera clear  ENMT: No tonsillar erythema, exudates, or enlargement; Moist mucous membranes  NECK: Supple, No JVD  NERVOUS SYSTEM:  Alert & Oriented X3; Motor Strength 5/5 B/L upper and lower extremities; DTRs 2+ intact and symmetric  CHEST/LUNG: Clear to percussion bilaterally; No rales, rhonchi, wheezing, or rubs  HEART: Regular rate and rhythm; No murmurs, rubs, or gallops  ABDOMEN: Soft, Nontender, Nondistended; Bowel sounds present  EXTREMITIES:  2+ Peripheral Pulses, No clubbing, cyanosis, or edema  SKIN: No rashes or lesions    MEDICATIONS  (STANDING):  aspirin  chewable 81 milliGRAM(s) Oral daily  atorvastatin 80 milliGRAM(s) Oral at bedtime  clopidogrel Tablet 75 milliGRAM(s) Oral daily  enoxaparin Injectable 40 milliGRAM(s) SubCutaneous every 24 hours  famotidine    Tablet 20 milliGRAM(s) Oral two times a day  insulin lispro (ADMELOG) corrective regimen sliding scale   SubCutaneous every 6 hours    MEDICATIONS  (PRN):      LABS:                        13.8   5.93  )-----------( 240      ( 20 Mar 2024 05:20 )             41.2     03-20    140  |  106  |  11  ----------------------------<  75  3.5   |  25  |  0.40<L>    Ca    8.5      20 Mar 2024 05:20  Phos  3.0     03-20  Mg     2.0     03-20    TPro  6.5  /  Alb  3.1<L>  /  TBili  0.8  /  DBili  x   /  AST  33  /  ALT  27  /  AlkPhos  79  03-20    PT/INR - ( 20 Mar 2024 05:20 )   PT: 11.6 sec;   INR: 0.99 ratio         PTT - ( 20 Mar 2024 05:20 )  PTT:26.1 sec  Urinalysis Basic - ( 20 Mar 2024 05:20 )    Color: x / Appearance: x / SG: x / pH: x  Gluc: 75 mg/dL / Ketone: x  / Bili: x / Urobili: x   Blood: x / Protein: x / Nitrite: x   Leuk Esterase: x / RBC: x / WBC x   Sq Epi: x / Non Sq Epi: x / Bacteria: x      CAPILLARY BLOOD GLUCOSE      POCT Blood Glucose.: 98 mg/dL (20 Mar 2024 12:06)  POCT Blood Glucose.: 124 mg/dL (20 Mar 2024 05:48)  POCT Blood Glucose.: 71 mg/dL (20 Mar 2024 05:22)  POCT Blood Glucose.: 69 mg/dL (20 Mar 2024 05:21)  POCT Blood Glucose.: 153 mg/dL (20 Mar 2024 00:29)  POCT Blood Glucose.: 72 mg/dL (20 Mar 2024 00:02)  POCT Blood Glucose.: 87 mg/dL (19 Mar 2024 17:22)              RADIOLOGY & ADDITIONAL TESTS:    Imaging Personally Reviewed:     Advance Directives:  full code    Palliative Care:  Appropriate     Patient is a 83y old  Female who presents with a chief complaint of CVA (20 Mar 2024 08:10)    pt seen and examine today awake slurred speech resolved , tolerating po , oob.   INTERVAL HPI/OVERNIGHT EVENTS:     T(C): 36.6 (03-20-24 @ 11:55), Max: 36.7 (03-20-24 @ 04:10)  HR: 106 (03-20-24 @ 13:00) (68 - 110)  BP: 144/74 (03-20-24 @ 13:00) (118/63 - 174/82)  RR: 39 (03-20-24 @ 13:00) (11 - 44)  SpO2: 98% (03-20-24 @ 13:00) (96% - 100%)  Wt(kg): --  I&O's Summary    19 Mar 2024 07:01  -  20 Mar 2024 07:00  --------------------------------------------------------  IN: 0 mL / OUT: 650 mL / NET: -650 mL        REVIEW OF SYSTEMS:  CONSTITUTIONAL: No fever, weight loss, or fatigue  EYES: No eye pain, visual disturbances, or discharge  ENMT:  No difficulty hearing, tinnitus, vertigo; No sinus or throat pain  NECK: No pain or stiffness  BREASTS: No pain, no masses  RESPIRATORY: No cough, wheezing, chills  No shortness of breath  CARDIOVASCULAR: No chest pain, palpitations, dizziness, or leg swelling  GASTROINTESTINAL: No abdominal or epigastric pain. No nausea, vomiting,  No diarrhea or constipation. No melena or hematochezia.  GENITOURINARY: No dysuria, frequency, hematuria, or incontinence  NEUROLOGICAL: No headaches, memory loss, loss of strength, numbness, or tremors  SKIN: No itching, burning, rashes, or lesions   MUSCULOSKELETAL: No joint pain or swelling; No muscle, back, or extremity pain    PHYSICAL EXAM:  GENERAL: NAD,   HEAD:  Atraumatic, Normocephalic  EYES: EOMI, PERRLA, conjunctiva and sclera clear  ENMT:  Moist mucous membranes  NECK: Supple, No JVD  NERVOUS SYSTEM:  Alert & Oriented X3; Motor Strength 5/5 B/L upper and lower extremities; DTRs 2+ intact and symmetric  CHEST/LUNG:   percussion bilaterally; No rales, rhonchi, wheezing,   HEART: Regular rate and rhythm; No murmurs,  no tachy   ABDOMEN: Soft, Nontender, Nondistended; Bowel sounds present  EXTREMITIES:  2+ Peripheral Pulses, No clubbing, cyanosis, or edema  SKIN: No rashes or lesions    MEDICATIONS  (STANDING):  aspirin  chewable 81 milliGRAM(s) Oral daily  atorvastatin 80 milliGRAM(s) Oral at bedtime  clopidogrel Tablet 75 milliGRAM(s) Oral daily  enoxaparin Injectable 40 milliGRAM(s) SubCutaneous every 24 hours  famotidine    Tablet 20 milliGRAM(s) Oral two times a day  insulin lispro (ADMELOG) corrective regimen sliding scale   SubCutaneous every 6 hours    MEDICATIONS  (PRN):      LABS:                        13.8   5.93  )-----------( 240      ( 20 Mar 2024 05:20 )             41.2     03-20    140  |  106  |  11  ----------------------------<  75  3.5   |  25  |  0.40<L>    Ca    8.5      20 Mar 2024 05:20  Phos  3.0     03-20  Mg     2.0     03-20    TPro  6.5  /  Alb  3.1<L>  /  TBili  0.8  /  DBili  x   /  AST  33  /  ALT  27  /  AlkPhos  79  03-20    PT/INR - ( 20 Mar 2024 05:20 )   PT: 11.6 sec;   INR: 0.99 ratio         PTT - ( 20 Mar 2024 05:20 )  PTT:26.1 sec  Urinalysis Basic - ( 20 Mar 2024 05:20 )    Color: x / Appearance: x / SG: x / pH: x  Gluc: 75 mg/dL / Ketone: x  / Bili: x / Urobili: x   Blood: x / Protein: x / Nitrite: x   Leuk Esterase: x / RBC: x / WBC x   Sq Epi: x / Non Sq Epi: x / Bacteria: x          POCT Blood Glucose.: 98 mg/dL (20 Mar 2024 12:06)  POCT Blood Glucose.: 124 mg/dL (20 Mar 2024 05:48)  POCT Blood Glucose.: 71 mg/dL (20 Mar 2024 05:22)  POCT Blood Glucose.: 69 mg/dL (20 Mar 2024 05:21)  POCT Blood Glucose.: 153 mg/dL (20 Mar 2024 00:29)  POCT Blood Glucose.: 72 mg/dL (20 Mar 2024 00:02)  POCT Blood Glucose.: 87 mg/dL (19 Mar 2024 17:22)              RADIOLOGY & ADDITIONAL TESTS:    Imaging Personally Reviewed:   no new test   Advance Directives:  full code    Palliative Care:  Appropriate

## 2024-03-20 NOTE — PROGRESS NOTE ADULT - PROBLEM SELECTOR PLAN 1
Code stroke called for NIHSS 4: R facial weakness, trace dysarthria, and difficulty with word finding/anomia. S/p TNK   - CT head noncon:  New calcific density in the left MCA insula branch may suggest embolic calcific plaque. Mild chronic microvascular changes without evidence of an acute transcortical infarction or hemorrhage  - CTA brain, CTA neck, CT perfusion: No core infarct. Possible brain at risk in left frontal lobe measuring 29 mL. Mild narrowing of the left MCA sylvian branch. No large vessel occlusion.   ICU for further evaluation and management for CVA s/p TNK  - Goal -180, keep diastolic <105  - F/u TTE - F/u MRA/MRI head/neck (of note, patient has severe anxiety about MRIs, will likely need sedation)  repeat ct head -   IMPRESSION: No acute intracranial hemorrhage.  Previously seen small acute/subacute infarcts within the left MCA   -   A1c 5.7  lipid profile  ldl 114 , TSH   - Aspiration, seizure, fall precaution  - Neuro checks every hour   - Repeat CTH at 24hrs post TNK  - Would recommend strict NPO & IVF until passes bedside dysphagia screen   - FU Speech and swallow eval --> start statin after dysphagia screen  - Hold ASA until cleared by neurology s/p TNK  - PT and OT consultation  - Neuro Dr. Rivera consulted  - Management per ICU Code stroke called for NIHSS 4: R facial weakness, trace dysarthria, and difficulty with word finding/anomia. S/p TNK   - CT head noncon:  New calcific density in the left MCA insula branch may suggest embolic calcific plaque. Mild chronic microvascular changes without evidence of an acute transcortical infarction or hemorrhage  - CTA brain, CTA neck, CT perfusion: No core infarct. Possible brain at risk in left frontal lobe measuring 29 mL. Mild narrowing of the left MCA sylvian branch. No large vessel occlusion.   ICU for further evaluation and management   for CVA s/p TNK  - Goal -180, -   A1c 5.7  lipid profile  ldl 114 ,   - Repeat CTH at 24hrs post TNK-repeat ct head -   IMPRESSION: No acute intracranial hemorrhage.  Previously seen small acute/subacute infarcts within the left MCA   -  Speech and swallow eval- passed  on dash /tlc   -  started  on  ASA   and Plavix   neurology  s/p TNK  - PT and OT consultation  - Neuro Dr. Rivera consulted  - Management per ICU

## 2024-03-20 NOTE — PROGRESS NOTE ADULT - SUBJECTIVE AND OBJECTIVE BOX
Neurology Follow up note    MAURA [id number]    HPI:  Patient is an 82 yo female with hx RA, COPD (former smoker), GERD, macular degeneration, migraine headaches, recent TIA diagnosed at [facility] on 3/17, presents for word finding difficulty and difficulty speaking. On 3/17 she was brought in by family for episode of approx 1 min 20 sec of expressive aphasia and facial drooping noted by daughter while eating dinner. This completely resolved prior to arrival to [place] ED. NIH stroke scale 0. She was to be admitted for TIA workup, but the pt signed out AMA. She was instructed to f/u with Neuro Dr. Lundberg and Vascular Neurologist Dr. Abraham. Then today, she was in her usual state of health, but while driving to her children's home at 7:45AM, she had sudden onset of feeling fatigued.  She noticed when she got into the house that she was having trouble with word finding and difficulty speaking. This lasted for about 30 minutes. EMS was called, and patient was brought to the emergency room for evaluation.  Patient provided the entirety of this history, and her speech is returned to almost normal. However, as her time in the ED progressed, patients symptoms returned, and she was unable to speak. She had an NIHSS of 4 for R facial weakness, trace dysarthria, and difficulty with word finding/anomia. Code stroke was called, and patient was found to have a calcific density in the left MCA insula branch. She was given TNK, and patient returned back to baseline. Upon exam, patient started to have difficulty finding words again. She was able to follow commands appropriately, strength was equal and intact in all 4 extremities and there was no pronator drift appreciated, However, a mild R facial droop was observed. Prior to aphasia, patient denied any headache, dizziness, blurred vision, chest pain, palpitations, SOB, n/v, dysuria, generalized pain.    ED COURSE:  Vitals: HR 84, /84, RR 16, SpO2 100 % on 2 L NC  Labs significant for: Cr 0.58, Trop 13.  EKbpm, NSR   Given: Tenecteplase 12 g IV    Imaging:  CT head noncontrast:   New calcific density in the left MCA insula branch may suggest embolic calcific plaque. Mild chronic microvascular changes without evidence of an acute transcortical infarction or hemorrhage  CTA brain, CTA neck, CT perfusion: No core infarct. Possible brain at risk in left frontal lobe measuring 29 mL. Mild narrowing of the left MCA sylvian branch. No large vessel occlusion. Consider MRI of the brain for further evaluation. (19 Mar 2024 10:05)      Interval History -doing better    Patient is seen, chart was reviewed and case was discussed with the treatment team.  Pt is not in any distress.   Lying on bed comfortably.   No events reported overnight.       Vital Signs Last 24 Hrs  T(C): 36.6 (20 Mar 2024 11:55), Max: 36.7 (20 Mar 2024 04:10)  T(F): 97.8 (20 Mar 2024 11:55), Max: 98 (20 Mar 2024 04:10)  HR: 115 (20 Mar 2024 16:00) (68 - 115)  BP: 138/67 (20 Mar 2024 16:00) (107/71 - 174/82)  BP(mean): 96 (20 Mar 2024 16:00) (83 - 118)  RR: 35 (20 Mar 2024 16:00) (11 - 44)  SpO2: 100% (20 Mar 2024 16:00) (96% - 100%)    Parameters below as of 19 Mar 2024 20:00  Patient On (Oxygen Delivery Method): room air            REVIEW OF SYSTEMS:    Constitutional: No fever, weight loss or fatigue  Eyes: No eye pain, visual disturbances, or discharge  ENT:  No difficulty hearing, tinnitus, vertigo; No sinus or throat pain  Neck: No pain or stiffness  Respiratory: No cough, wheezing, chills or hemoptysis  Cardiovascular: No chest pain, palpitations, shortness of breath, dizziness or leg swelling  Gastrointestinal: No abdominal or epigastric pain. No nausea, vomiting or hematemesis;  Genitourinary: No dysuria, frequency, hematuria or incontinence  Neurological: No headaches, memory loss, loss of strength, numbness or tremors  Psychiatric: No depression, anxiety, mood swings or difficulty sleeping  Musculoskeletal: No joint pain or swelling; No muscle, back or extremity pain  Skin: No itching, burning, rashes or lesions   Lymph Nodes: No enlarged glands  Endocrine: No heat or cold intolerance; No hair lo  Allergy and Immunologic: No hives or eczema    On Neurological Examination:    Mental Status - Pt is alert, awake, oriented X3. Follows commands well and able to answer questions appropriately.Mood and affect  normal    Speech -  Normal    Cranial Nerves - Pupils 3 mm equal and reactive to light, extraocular eye movements intact. Pt has no visual field deficit.  Pt has no facial asymmetry. Facial sensation is intact.Tongue - is in midline.    Muscle tone - is adrianne      Motor Exam - 5/5 all over, No drift. No shaking or tremors.    Sensory Exam - Pin prick, temperature, joint position and vibration are intact on either side. Pt withdraws all extremities equally on stimulation. No asymmetry seen. No complaints of tingling, numbness.    Gait - Able to stand and walk unassisted.      coordination:    Finger to nose: normal    .    Deep tendon Reflexes - 2 plus all over.      .    Neck Supple -  Yes.     MEDICATIONS    aspirin  chewable 81 milliGRAM(s) Oral daily  atorvastatin 80 milliGRAM(s) Oral at bedtime  clopidogrel Tablet 75 milliGRAM(s) Oral daily  enoxaparin Injectable 40 milliGRAM(s) SubCutaneous every 24 hours  famotidine    Tablet 20 milliGRAM(s) Oral two times a day  insulin lispro (ADMELOG) corrective regimen sliding scale   SubCutaneous every 6 hours      Allergies    penicillin (Hives)  tramadol (Unknown)  tramadol (Other)  penicillin (Pruritus)    Intolerances        LABS:  CBC Full  -  ( 20 Mar 2024 05:20 )  WBC Count : 5.93 K/uL  RBC Count : 4.39 M/uL  Hemoglobin : 13.8 g/dL  Hematocrit : 41.2 %  Platelet Count - Automated : 240 K/uL  Mean Cell Volume : 93.8 fl  Mean Cell Hemoglobin : 31.4 pg  Mean Cell Hemoglobin Concentration : 33.5 gm/dL  Auto Neutrophil # : 3.33 K/uL  Auto Lymphocyte # : 1.91 K/uL      Urinalysis Basic - ( 20 Mar 2024 05:20 )    Color: x / Appearance: x / SG: x / pH: x  Gluc: 75 mg/dL / Ketone: x  / Bili: x / Urobili: x   Blood: x / Protein: x / Nitrite: x   Leuk Esterase: x / RBC: x / WBC x   Sq Epi: x / Non Sq Epi: x / Bacteria: x          140  |  106  |  11  ----------------------------<  75  3.5   |  25  |  0.40<L>    Ca    8.5      20 Mar 2024 05:20  Phos  3.0     03-20  Mg     2.0     -20    TPro  6.5  /  Alb  3.1<L>  /  TBili  0.8  /  DBili  x   /  AST  33  /  ALT  27  /  AlkPhos  79  -20    Hemoglobin A1C:   Lipid Panel  @ 05:20  Total Cholesterol, Serum 213  LDL --  Triglycerides 54    Vitamin B12     RADIOLOGY    ASSESSMENT AND PLAN:      seen for aphasia  brain mri- multiple punctate left mca infarct  likely cardioembolic  no hx of AF    Continue ap/statin  Physical therapy evaluation.  OOB to chair/ambulation with assistance only.  Pain is accessed and addressed.  Would continue to follow.

## 2024-03-20 NOTE — OCCUPATIONAL THERAPY INITIAL EVALUATION ADULT - NSBATHINGEQUIP_GEN_A_OT
for energy conservation and safety during bathing; family reports they will look into it/shower chair

## 2024-03-20 NOTE — PHYSICAL THERAPY INITIAL EVALUATION ADULT - PERTINENT HX OF CURRENT PROBLEM, REHAB EVAL
Patient is an 82 yo female with hx RA, COPD (former smoker), GERD, macular degeneration, migraine headaches, recent TIA diagnosed at [facility] on 3/17, presents for word finding difficulty and difficulty speaking. On 3/17 she was brought in by family for episode of approx 1 min 20 sec of expressive aphasia and facial drooping noted by daughter while eating dinner. This completely resolved prior to arrival to [place] ED. NIH stroke scale 0. She was to be admitted for TIA workup, but the pt signed out AMA. She was instructed to f/u with Neuro Dr. Lundberg and Vascular Neurologist Dr. Abraham. Then today, she was in her usual state of health, but while driving to her children's home at 7:45AM, she had sudden onset of feeling fatigued.  She noticed when she got into the house that she was having trouble with word finding and difficulty speaking. This lasted for about 30 minutes. EMS was called, and patient was brought to the emergency room for evaluation.  Patient provided the entirety of this history, and her speech is returned to almost normal. However, as her time in the ED progressed, patients symptoms returned, and she was unable to speak. She had an NIHSS of 4 for R facial weakness, trace dysarthria, and difficulty with word finding/anomia. Code stroke was called, and patient was found to have a calcific density in the left MCA insula branch. She was given TNK, and patient returned back to baseline. Upon exam, patient started to have difficulty finding words again. She was able to follow commands appropriately, strength was equal and intact in all 4 extremities and there was no pronator drift appreciated, However, a mild R facial droop was observed. Prior to aphasia, patient denied any headache, dizziness, blurred vision, chest pain, palpitations, SOB, n/v, dysuria, generalized pain.

## 2024-03-20 NOTE — SWALLOW BEDSIDE ASSESSMENT ADULT - SWALLOW EVAL: DIAGNOSIS
Patient presents with a functional oropharyngeal swallow to safely resume a regular solid diet and thin liquids. Functional oral prep and transit appreciated, as well as adequate oral clearance. Pharyngeal trigger appears timely with palpable hyolaryngeal elevation/excursion and no overt, clinical s/s of laryngeal penetration/aspiration across all PO trials.

## 2024-03-20 NOTE — CONSULT NOTE ADULT - ATTENDING COMMENTS
82 yo female with hx RA, COPD (former smoker), GERD, macular degeneration, migraine headaches, recent TIA diagnosed at [facility] on 3/17, presents for word finding difficulty and difficulty speaking, admitted for CVA.       - Pt admitted and found to have infarcts in the L MCA territory  - s/p TNK with improvement in symptoms, residual speech deficit present  - EKG NSR @ 79bpm  - c/w asa and plavix, statin  - tte with EF 55-60%, RA mildly dilated, mild MR, mod AR, mod-severe TR  - would repeat limited TTE with bubble study  - will need ILR if no discernable cause of cva during admission    - troponin neg, no clear evidence of acute ischemia     - has moderate AI which will need to be monitored as outpt  - has moderate to severe TR. Likely from COPD/fibrosis. will need repeat echo as outpt to reassess PASP  - no signs of RHF at this time. likely this a chronic finding.   - cont permissive htn.   - cont ICU monitoring.

## 2024-03-21 ENCOUNTER — TRANSCRIPTION ENCOUNTER (OUTPATIENT)
Age: 84
End: 2024-03-21

## 2024-03-21 ENCOUNTER — APPOINTMENT (OUTPATIENT)
Dept: DERMATOLOGY | Facility: CLINIC | Age: 84
End: 2024-03-21

## 2024-03-21 VITALS
HEART RATE: 83 BPM | RESPIRATION RATE: 28 BRPM | OXYGEN SATURATION: 98 % | DIASTOLIC BLOOD PRESSURE: 75 MMHG | SYSTOLIC BLOOD PRESSURE: 146 MMHG

## 2024-03-21 LAB
ALBUMIN SERPL ELPH-MCNC: 3.1 G/DL — LOW (ref 3.3–5)
ALP SERPL-CCNC: 73 U/L — SIGNIFICANT CHANGE UP (ref 40–120)
ALT FLD-CCNC: 27 U/L — SIGNIFICANT CHANGE UP (ref 12–78)
ANION GAP SERPL CALC-SCNC: 5 MMOL/L — SIGNIFICANT CHANGE UP (ref 5–17)
AST SERPL-CCNC: 28 U/L — SIGNIFICANT CHANGE UP (ref 15–37)
BASOPHILS # BLD AUTO: 0.07 K/UL — SIGNIFICANT CHANGE UP (ref 0–0.2)
BASOPHILS NFR BLD AUTO: 1.3 % — SIGNIFICANT CHANGE UP (ref 0–2)
BILIRUB SERPL-MCNC: 0.9 MG/DL — SIGNIFICANT CHANGE UP (ref 0.2–1.2)
BUN SERPL-MCNC: 13 MG/DL — SIGNIFICANT CHANGE UP (ref 7–23)
CALCIUM SERPL-MCNC: 8.9 MG/DL — SIGNIFICANT CHANGE UP (ref 8.5–10.1)
CHLORIDE SERPL-SCNC: 106 MMOL/L — SIGNIFICANT CHANGE UP (ref 96–108)
CO2 SERPL-SCNC: 30 MMOL/L — SIGNIFICANT CHANGE UP (ref 22–31)
CREAT SERPL-MCNC: 0.61 MG/DL — SIGNIFICANT CHANGE UP (ref 0.5–1.3)
EGFR: 90 ML/MIN/1.73M2 — SIGNIFICANT CHANGE UP
EOSINOPHIL # BLD AUTO: 0.09 K/UL — SIGNIFICANT CHANGE UP (ref 0–0.5)
EOSINOPHIL NFR BLD AUTO: 1.7 % — SIGNIFICANT CHANGE UP (ref 0–6)
GLUCOSE SERPL-MCNC: 88 MG/DL — SIGNIFICANT CHANGE UP (ref 70–99)
HCT VFR BLD CALC: 39.5 % — SIGNIFICANT CHANGE UP (ref 34.5–45)
HGB BLD-MCNC: 13.3 G/DL — SIGNIFICANT CHANGE UP (ref 11.5–15.5)
IMM GRANULOCYTES NFR BLD AUTO: 0.4 % — SIGNIFICANT CHANGE UP (ref 0–0.9)
LYMPHOCYTES # BLD AUTO: 2.22 K/UL — SIGNIFICANT CHANGE UP (ref 1–3.3)
LYMPHOCYTES # BLD AUTO: 41.9 % — SIGNIFICANT CHANGE UP (ref 13–44)
MAGNESIUM SERPL-MCNC: 1.9 MG/DL — SIGNIFICANT CHANGE UP (ref 1.6–2.6)
MCHC RBC-ENTMCNC: 31.4 PG — SIGNIFICANT CHANGE UP (ref 27–34)
MCHC RBC-ENTMCNC: 33.7 GM/DL — SIGNIFICANT CHANGE UP (ref 32–36)
MCV RBC AUTO: 93.4 FL — SIGNIFICANT CHANGE UP (ref 80–100)
MONOCYTES # BLD AUTO: 0.68 K/UL — SIGNIFICANT CHANGE UP (ref 0–0.9)
MONOCYTES NFR BLD AUTO: 12.8 % — SIGNIFICANT CHANGE UP (ref 2–14)
NEUTROPHILS # BLD AUTO: 2.22 K/UL — SIGNIFICANT CHANGE UP (ref 1.8–7.4)
NEUTROPHILS NFR BLD AUTO: 41.9 % — LOW (ref 43–77)
NRBC # BLD: 0 /100 WBCS — SIGNIFICANT CHANGE UP (ref 0–0)
PHOSPHATE SERPL-MCNC: 3.4 MG/DL — SIGNIFICANT CHANGE UP (ref 2.5–4.5)
PLATELET # BLD AUTO: 221 K/UL — SIGNIFICANT CHANGE UP (ref 150–400)
POTASSIUM SERPL-MCNC: 4 MMOL/L — SIGNIFICANT CHANGE UP (ref 3.5–5.3)
POTASSIUM SERPL-SCNC: 4 MMOL/L — SIGNIFICANT CHANGE UP (ref 3.5–5.3)
PROT SERPL-MCNC: 6.7 G/DL — SIGNIFICANT CHANGE UP (ref 6–8.3)
RBC # BLD: 4.23 M/UL — SIGNIFICANT CHANGE UP (ref 3.8–5.2)
RBC # FLD: 13.2 % — SIGNIFICANT CHANGE UP (ref 10.3–14.5)
SODIUM SERPL-SCNC: 141 MMOL/L — SIGNIFICANT CHANGE UP (ref 135–145)
WBC # BLD: 5.3 K/UL — SIGNIFICANT CHANGE UP (ref 3.8–10.5)
WBC # FLD AUTO: 5.3 K/UL — SIGNIFICANT CHANGE UP (ref 3.8–10.5)

## 2024-03-21 PROCEDURE — 93306 TTE W/DOPPLER COMPLETE: CPT

## 2024-03-21 PROCEDURE — 86900 BLOOD TYPING SEROLOGIC ABO: CPT

## 2024-03-21 PROCEDURE — 70450 CT HEAD/BRAIN W/O DYE: CPT | Mod: MC

## 2024-03-21 PROCEDURE — 82962 GLUCOSE BLOOD TEST: CPT

## 2024-03-21 PROCEDURE — 83735 ASSAY OF MAGNESIUM: CPT

## 2024-03-21 PROCEDURE — 93880 EXTRACRANIAL BILAT STUDY: CPT

## 2024-03-21 PROCEDURE — 0042T: CPT | Mod: MC

## 2024-03-21 PROCEDURE — 86850 RBC ANTIBODY SCREEN: CPT

## 2024-03-21 PROCEDURE — 92522 EVALUATE SPEECH PRODUCTION: CPT

## 2024-03-21 PROCEDURE — 99233 SBSQ HOSP IP/OBS HIGH 50: CPT

## 2024-03-21 PROCEDURE — 97162 PT EVAL MOD COMPLEX 30 MIN: CPT

## 2024-03-21 PROCEDURE — 99233 SBSQ HOSP IP/OBS HIGH 50: CPT | Mod: GC

## 2024-03-21 PROCEDURE — 36415 COLL VENOUS BLD VENIPUNCTURE: CPT

## 2024-03-21 PROCEDURE — 99291 CRITICAL CARE FIRST HOUR: CPT | Mod: 25

## 2024-03-21 PROCEDURE — 70496 CT ANGIOGRAPHY HEAD: CPT | Mod: MC

## 2024-03-21 PROCEDURE — 85610 PROTHROMBIN TIME: CPT

## 2024-03-21 PROCEDURE — 92610 EVALUATE SWALLOWING FUNCTION: CPT

## 2024-03-21 PROCEDURE — 80053 COMPREHEN METABOLIC PANEL: CPT

## 2024-03-21 PROCEDURE — 83036 HEMOGLOBIN GLYCOSYLATED A1C: CPT

## 2024-03-21 PROCEDURE — 86901 BLOOD TYPING SEROLOGIC RH(D): CPT

## 2024-03-21 PROCEDURE — 37195 THROMBOLYTIC THERAPY STROKE: CPT

## 2024-03-21 PROCEDURE — 70551 MRI BRAIN STEM W/O DYE: CPT | Mod: MC

## 2024-03-21 PROCEDURE — 70498 CT ANGIOGRAPHY NECK: CPT | Mod: MC

## 2024-03-21 PROCEDURE — 97165 OT EVAL LOW COMPLEX 30 MIN: CPT

## 2024-03-21 PROCEDURE — 71045 X-RAY EXAM CHEST 1 VIEW: CPT

## 2024-03-21 PROCEDURE — 80061 LIPID PANEL: CPT

## 2024-03-21 PROCEDURE — 82550 ASSAY OF CK (CPK): CPT

## 2024-03-21 PROCEDURE — 84484 ASSAY OF TROPONIN QUANT: CPT

## 2024-03-21 PROCEDURE — 84100 ASSAY OF PHOSPHORUS: CPT

## 2024-03-21 PROCEDURE — 93005 ELECTROCARDIOGRAM TRACING: CPT

## 2024-03-21 PROCEDURE — 85025 COMPLETE CBC W/AUTO DIFF WBC: CPT

## 2024-03-21 PROCEDURE — 85730 THROMBOPLASTIN TIME PARTIAL: CPT

## 2024-03-21 PROCEDURE — 82553 CREATINE MB FRACTION: CPT

## 2024-03-21 RX ORDER — ATORVASTATIN CALCIUM 80 MG/1
1 TABLET, FILM COATED ORAL
Qty: 30 | Refills: 0
Start: 2024-03-21 | End: 2024-04-19

## 2024-03-21 RX ORDER — ASPIRIN/CALCIUM CARB/MAGNESIUM 324 MG
1 TABLET ORAL
Qty: 30 | Refills: 0
Start: 2024-03-21 | End: 2024-04-19

## 2024-03-21 RX ORDER — CLOPIDOGREL BISULFATE 75 MG/1
1 TABLET, FILM COATED ORAL
Qty: 30 | Refills: 0
Start: 2024-03-21 | End: 2024-04-19

## 2024-03-21 RX ADMIN — CLOPIDOGREL BISULFATE 75 MILLIGRAM(S): 75 TABLET, FILM COATED ORAL at 11:38

## 2024-03-21 RX ADMIN — Medication 2 MILLIGRAM(S): at 05:32

## 2024-03-21 RX ADMIN — Medication 81 MILLIGRAM(S): at 11:38

## 2024-03-21 RX ADMIN — ENOXAPARIN SODIUM 40 MILLIGRAM(S): 100 INJECTION SUBCUTANEOUS at 11:39

## 2024-03-21 RX ADMIN — FAMOTIDINE 20 MILLIGRAM(S): 10 INJECTION INTRAVENOUS at 05:33

## 2024-03-21 NOTE — CASE MANAGEMENT PROGRESS NOTE - NSCMPROGRESSNOTE_GEN_ALL_CORE
Per MD, patient is medically cleared for discharge home today.  CM met with patient and daughter Brenda at bedside to discussed discharge disposition home with [facility].  Discharge notice signed and copy given to patient. Physical script for speech provided to patient. Daughter will transport patient home.  Patient and daughter verbalized understanding of the transition plan and is in agreement.  CM remains available throughout hospital stay.

## 2024-03-21 NOTE — DISCHARGE NOTE PROVIDER - NSDCCPCAREPLAN_GEN_ALL_CORE_FT
PRINCIPAL DISCHARGE DIAGNOSIS  Diagnosis: CVA (cerebrovascular accident)  Assessment and Plan of Treatment: You were found to have an acute stroke, which was treated with a clot-busting medication. Your neurological defecits resolved.  Please START taking aspirin 81 mg daily.  Please START taking Plavix 75 mg daily.  Please START taking Atorvastatin 80 mg daily.  Please FOLLOW UP with your primary care doctor, cardiologist, and neurologist within 1 week of discharge from the hospital.      SECONDARY DISCHARGE DIAGNOSES  Diagnosis: Abnormal finding on imaging  Assessment and Plan of Treatment: We performed ultrasound imaging of your heart (echocardiography) and found a few abnormalities in some of your valves, written out in greater detail in your hospital course. Please FOLLOW UP with your cardiologist within 1 week of discharge, and show them this discharge documentation.    Diagnosis: HLD (hyperlipidemia)  Assessment and Plan of Treatment: You were found to have high cholesterol in your blood, which is a contributing risk factor for stroke. Please START taking Atorvastatin 80 mg daily as above. Please FOLLOW UP with your primary care doctor within 1 week of discharge.

## 2024-03-21 NOTE — PROGRESS NOTE ADULT - SUBJECTIVE AND OBJECTIVE BOX
Neurology Follow up note    MAURA [id number]    HPI:  Patient is an 84 yo female with hx RA, COPD (former smoker), GERD, macular degeneration, migraine headaches, recent TIA diagnosed at [facility] on 3/17, presents for word finding difficulty and difficulty speaking. On 3/17 she was brought in by family for episode of approx 1 min 20 sec of expressive aphasia and facial drooping noted by daughter while eating dinner. This completely resolved prior to arrival to [place] ED. NIH stroke scale 0. She was to be admitted for TIA workup, but the pt signed out AMA. She was instructed to f/u with Neuro Dr. Lundberg and Vascular Neurologist Dr. Abraham. Then today, she was in her usual state of health, but while driving to her children's home at 7:45AM, she had sudden onset of feeling fatigued.  She noticed when she got into the house that she was having trouble with word finding and difficulty speaking. This lasted for about 30 minutes. EMS was called, and patient was brought to the emergency room for evaluation.  Patient provided the entirety of this history, and her speech is returned to almost normal. However, as her time in the ED progressed, patients symptoms returned, and she was unable to speak. She had an NIHSS of 4 for R facial weakness, trace dysarthria, and difficulty with word finding/anomia. Code stroke was called, and patient was found to have a calcific density in the left MCA insula branch. She was given TNK, and patient returned back to baseline. Upon exam, patient started to have difficulty finding words again. She was able to follow commands appropriately, strength was equal and intact in all 4 extremities and there was no pronator drift appreciated, However, a mild R facial droop was observed. Prior to aphasia, patient denied any headache, dizziness, blurred vision, chest pain, palpitations, SOB, n/v, dysuria, generalized pain.    ED COURSE:  Vitals: HR 84, /84, RR 16, SpO2 100 % on 2 L NC  Labs significant for: Cr 0.58, Trop 13.  EKbpm, NSR   Given: Tenecteplase 12 g IV    Imaging:  CT head noncontrast:   New calcific density in the left MCA insula branch may suggest embolic calcific plaque. Mild chronic microvascular changes without evidence of an acute transcortical infarction or hemorrhage  CTA brain, CTA neck, CT perfusion: No core infarct. Possible brain at risk in left frontal lobe measuring 29 mL. Mild narrowing of the left MCA sylvian branch. No large vessel occlusion. Consider MRI of the brain for further evaluation. (19 Mar 2024 10:05)      Interval History -no new events    Patient is seen, chart was reviewed and case was discussed with the treatment team.  Pt is not in any distress.   Lying on bed comfortably.         Vital Signs Last 24 Hrs  T(C): 36.7 (21 Mar 2024 11:48), Max: 36.8 (20 Mar 2024 20:28)  T(F): 98.1 (21 Mar 2024 11:48), Max: 98.3 (20 Mar 2024 20:28)  HR: 88 (21 Mar 2024 10:00) (65 - 97)  BP: 102/56 (21 Mar 2024 10:00) (98/53 - 157/71)  BP(mean): 75 (21 Mar 2024 10:00) (73 - 102)  RR: 22 (21 Mar 2024 10:00) (10 - 35)  SpO2: 98% (21 Mar 2024 10:00) (93% - 100%)    Parameters below as of 21 Mar 2024 07:00  Patient On (Oxygen Delivery Method): room air                REVIEW OF SYSTEMS:    Constitutional: No fever, weight loss or fatigue  Eyes: No eye pain, visual disturbances, or discharge  ENT:  No difficulty hearing, tinnitus, vertigo; No sinus or throat pain  Neck: No pain or stiffness  Respiratory: No cough, wheezing, chills or hemoptysis  Cardiovascular: No chest pain, palpitations, shortness of breath, dizziness or leg swelling  Gastrointestinal: No abdominal or epigastric pain. No nausea, vomiting or hematemesis;  Genitourinary: No dysuria, frequency, hematuria or incontinence  Neurological: No headaches, memory loss, loss of strength, numbness or tremors  Psychiatric: No depression, anxiety, mood swings or difficulty sleeping  Musculoskeletal: No joint pain or swelling; No muscle, back or extremity pain  Skin: No itching, burning, rashes or lesions   Lymph Nodes: No enlarged glands  Endocrine: No heat or cold intolerance; No hair lo  Allergy and Immunologic: No hives or eczema    On Neurological Examination:    Mental Status - Pt is alert, awake, oriented X3. Follows commands well and able to answer questions appropriately.Mood and affect  normal    Speech -  Normal    Cranial Nerves - Pupils 3 mm equal and reactive to light, extraocular eye movements intact. Pt has no visual field deficit.  Pt has no facial asymmetry. Facial sensation is intact.Tongue - is in midline.    Muscle tone - is adrianne      Motor Exam - 5/5 all over, No drift. No shaking or tremors.    Sensory Exam - Pin prick, temperature, joint position and vibration are intact on either side. Pt withdraws all extremities equally on stimulation. No asymmetry seen. No complaints of tingling, numbness.    Gait - Able to stand and walk unassisted.      coordination:    Finger to nose: normal    .    Deep tendon Reflexes - 2 plus all over.      .    Neck Supple -  Yes.     MEDICATIONS  (STANDING):  aspirin  chewable 81 milliGRAM(s) Oral daily  atorvastatin 80 milliGRAM(s) Oral at bedtime  clopidogrel Tablet 75 milliGRAM(s) Oral daily  dextrose 5%. 1000 milliLiter(s) (100 mL/Hr) IV Continuous <Continuous>  enoxaparin Injectable 40 milliGRAM(s) SubCutaneous every 24 hours  famotidine    Tablet 20 milliGRAM(s) Oral two times a day  glucagon  Injectable 1 milliGRAM(s) IntraMuscular once  insulin lispro (ADMELOG) corrective regimen sliding scale   SubCutaneous three times a day before meals  insulin lispro (ADMELOG) corrective regimen sliding scale   SubCutaneous at bedtime  predniSONE   Tablet 2 milliGRAM(s) Oral daily    MEDICATIONS  (PRN):            Allergies    penicillin (Hives)                          13.3   5.30  )-----------( 221      ( 21 Mar 2024 05:25 )             39.5     Hemoglobin A1C:   Lipid Panel - @ 05:20  Total Cholesterol, Serum 213  LDL --  Triglycerides 54    Vitamin B12     RADIOLOGY    ASSESSMENT AND PLAN:      seen for aphasia  brain mri- multiple punctate left mca infarct  likely cardioembolic  no hx of AF    Continue ap/statin  need embolic w/u as out patient(DANK/ILR)  Physical therapy evaluation.  OOB to chair/ambulation with assistance only.  Pain is accessed and addressed.  Would continue to follow.

## 2024-03-21 NOTE — PROGRESS NOTE ADULT - PROBLEM SELECTOR PLAN 2
Chronic. Gets monthly Actemra infusions (last infusion 2weeks ago)   - Continue home prednisone 2 mg daily  - Management per ICU
Chronic. Gets monthly Actemra infusions (last infusion 2weeks ago)   - Continue home prednisone 2 mg daily  - Management per ICU

## 2024-03-21 NOTE — DISCHARGE NOTE PROVIDER - CARE PROVIDER_API CALL
Boxer, Jonathan A  Internal Medicine  [address] [place] [address]  Phone: [phone]  Fax: [phone]  Established Patient  Follow Up Time: 1 week   Boxer, Jonathan A  Internal Medicine  [address] [place] [id number]  Phone: [phone]  Fax: [phone]  Established Patient  Follow Up Time: 1 week    Nirmala Rivera  Neurology  [address] [place] [address]  Phone: [phone]  Fax: [phone]  Follow Up Time: 1 week    Boyd Ramirez  Cardiology  [address] [place] [address]  Phone: [phone]  Fax: [phone]  Follow Up Time: 1 week

## 2024-03-21 NOTE — DISCHARGE NOTE PROVIDER - NSDCHHHOMEBOUND_GEN_ALL_CORE
Stroke/TBI (neurological/cognitive impairment)/Fall risk Stroke/TBI (neurological/cognitive impairment)

## 2024-03-21 NOTE — DIETITIAN INITIAL EVALUATION ADULT - PERTINENT MEDS FT
MEDICATIONS  (STANDING):  aspirin  chewable 81 milliGRAM(s) Oral daily  atorvastatin 80 milliGRAM(s) Oral at bedtime  clopidogrel Tablet 75 milliGRAM(s) Oral daily  dextrose 5%. 1000 milliLiter(s) (100 mL/Hr) IV Continuous <Continuous>  enoxaparin Injectable 40 milliGRAM(s) SubCutaneous every 24 hours  famotidine    Tablet 20 milliGRAM(s) Oral two times a day  glucagon  Injectable 1 milliGRAM(s) IntraMuscular once  insulin lispro (ADMELOG) corrective regimen sliding scale   SubCutaneous three times a day before meals  insulin lispro (ADMELOG) corrective regimen sliding scale   SubCutaneous at bedtime  predniSONE   Tablet 2 milliGRAM(s) Oral daily    MEDICATIONS  (PRN):

## 2024-03-21 NOTE — DIETITIAN INITIAL EVALUATION ADULT - ORAL INTAKE PTA/DIET HISTORY
Pt lives alone at home. Reports fair appetite/intake. Overall decline in appetite over the past few years. Typically consumes 3 small portion meals/day. Consumes an ensure shake ~2-3x/week. Regular diet at home.

## 2024-03-21 NOTE — DISCHARGE NOTE PROVIDER - PROVIDER TOKENS
PROVIDER:[TOKEN:[[id number]],FOLLOWUP:[1 week],ESTABLISHEDPATIENT:[T]] PROVIDER:[TOKEN:[[id number]],FOLLOWUP:[1 week],ESTABLISHEDPATIENT:[T]],PROVIDER:[TOKEN:[[id number]],FOLLOWUP:[1 week]],PROVIDER:[TOKEN:[[id number]],FOLLOWUP:[1 week]]

## 2024-03-21 NOTE — DIETITIAN NUTRITION RISK NOTIFICATION - ADDITIONAL COMMENTS/DIETITIAN RECOMMENDATIONS
Recommend ensure plus HP daily (350kcal, 20gm protein per 8 fl oz serving)  Will honor food preferences as able to optimize po intake/tolerance

## 2024-03-21 NOTE — PROGRESS NOTE ADULT - PROBLEM SELECTOR PLAN 4
DVT Prophylaxis: SCDs in setting of recent TNK  Management per ICU
DVT Prophylaxis: SCDs in setting of recent TNK    Management per ICU

## 2024-03-21 NOTE — PROGRESS NOTE ADULT - PROBLEM SELECTOR PLAN 3
Chronic  - Continue home Famotidine 20 mg BID  - Management per ICU
Chronic  - Continue home Famotidine 20 mg BID  - Management per ICU

## 2024-03-21 NOTE — DISCHARGE NOTE NURSING/CASE MANAGEMENT/SOCIAL WORK - NSSCCONTNUM_GEN_ALL_CORE
[facility] Home care agency [phone] or [phone] will reach out to you within 24-72 hours of your discharge to schedule home care visit/eval appointment with you. Please call agency for any queries regarding home care services

## 2024-03-21 NOTE — PROGRESS NOTE ADULT - SUBJECTIVE AND OBJECTIVE BOX
[facility] - Scott Amos, Ashley, Michael, [place]  Office Number:  [phone]    Patient resting comfortably in bed in NAD.  Laying flat with no respiratory distress.  No complaints of chest pain, dyspnea, palpitations, PND, or orthopnea.    ROS: negative unless otherwise mentioned.    Telemetry:  sr    MEDICATIONS  (STANDING):  aspirin  chewable 81 milliGRAM(s) Oral daily  atorvastatin 80 milliGRAM(s) Oral at bedtime  clopidogrel Tablet 75 milliGRAM(s) Oral daily  dextrose 5%. 1000 milliLiter(s) (100 mL/Hr) IV Continuous <Continuous>  enoxaparin Injectable 40 milliGRAM(s) SubCutaneous every 24 hours  famotidine    Tablet 20 milliGRAM(s) Oral two times a day  glucagon  Injectable 1 milliGRAM(s) IntraMuscular once  insulin lispro (ADMELOG) corrective regimen sliding scale   SubCutaneous three times a day before meals  insulin lispro (ADMELOG) corrective regimen sliding scale   SubCutaneous at bedtime  predniSONE   Tablet 2 milliGRAM(s) Oral daily    MEDICATIONS  (PRN):      Allergies    penicillin (Hives)  tramadol (Unknown)  tramadol (Other)  penicillin (Pruritus)    Intolerances        Vital Signs Last 24 Hrs  T(C): 36.5 (21 Mar 2024 07:53), Max: 36.8 (20 Mar 2024 20:28)  T(F): 97.7 (21 Mar 2024 07:53), Max: 98.3 (20 Mar 2024 20:28)  HR: 88 (21 Mar 2024 10:00) (65 - 115)  BP: 102/56 (21 Mar 2024 10:00) (98/53 - 157/71)  BP(mean): 75 (21 Mar 2024 10:00) (73 - 102)  RR: 22 (21 Mar 2024 10:00) (10 - 39)  SpO2: 98% (21 Mar 2024 10:00) (93% - 100%)    Parameters below as of 21 Mar 2024 07:00  Patient On (Oxygen Delivery Method): room air        I&O's Summary      ON EXAM:    General: NAD, awake and alert, oriented x 3  HEENT: Mucous membranes are moist, anicteric  Lungs: Non-labored, breath sounds are clear bilaterally, No wheezing, rales or rhonchi  Cardiovascular: Regular, S1 and S2, no murmurs, rubs, or gallops  Gastrointestinal: Bowel Sounds present, soft, nontender.   Lymph: No peripheral edema. No lymphadenopathy.  Skin: No rashes or ulcers  Psych:  Mood & affect appropriate    LABS: All Labs Reviewed:                        13.3   5.30  )-----------( 221      ( 21 Mar 2024 05:25 )             39.5                         13.8   5.93  )-----------( 240      ( 20 Mar 2024 05:20 )             41.2                         13.6   8.69  )-----------( 256      ( 19 Mar 2024 14:10 )             40.7     21 Mar 2024 05:25    141    |  106    |  13     ----------------------------<  88     4.0     |  30     |  0.61   20 Mar 2024 05:20    140    |  106    |  11     ----------------------------<  75     3.5     |  25     |  0.40   19 Mar 2024 14:10    140    |  107    |  9      ----------------------------<  95     4.0     |  29     |  0.48     Ca    8.9        21 Mar 2024 05:25  Ca    8.5        20 Mar 2024 05:20  Ca    8.5        19 Mar 2024 14:10  Phos  3.4       21 Mar 2024 05:25  Phos  3.0       20 Mar 2024 05:20  Phos  2.9       19 Mar 2024 14:10  Mg     1.9       21 Mar 2024 05:25  Mg     2.0       20 Mar 2024 05:20  Mg     2.1       19 Mar 2024 14:10    TPro  6.7    /  Alb  3.1    /  TBili  0.9    /  DBili  x      /  AST  28     /  ALT  27     /  AlkPhos  73     21 Mar 2024 05:25  TPro  6.5    /  Alb  3.1    /  TBili  0.8    /  DBili  x      /  AST  33     /  ALT  27     /  AlkPhos  79     20 Mar 2024 05:20  TPro  6.9    /  Alb  3.4    /  TBili  0.6    /  DBili  x      /  AST  36     /  ALT  31     /  AlkPhos  81     19 Mar 2024 14:10    PT/INR - ( 20 Mar 2024 05:20 )   PT: 11.6 sec;   INR: 0.99 ratio         PTT - ( 20 Mar 2024 05:20 )  PTT:26.1 sec  CARDIAC MARKERS ( 20 Mar 2024 05:20 )  x     / x     / 138 U/L / x     / 2.9 ng/mL  CARDIAC MARKERS ( 19 Mar 2024 14:10 )  x     / x     / 152 U/L / x     / 4.6 ng/mL      Blood Culture:

## 2024-03-21 NOTE — DISCHARGE NOTE NURSING/CASE MANAGEMENT/SOCIAL WORK - NSSCTYPOFSERV_GEN_ALL_CORE
RN and Speech Therapy to visit the day after the hospital discharge.  Please contact the home care agency  at the above phone number if you have not heard from them by 12 noon on the day after your hospital discharge.

## 2024-03-21 NOTE — PROGRESS NOTE ADULT - PROBLEM SELECTOR PLAN 1
Code stroke called for NIHSS 4: R facial weakness, trace dysarthria, and difficulty with word finding/anomia. S/p TNK   - CT head noncon:  New calcific density in the left MCA insula branch may suggest embolic calcific plaque. Mild chronic microvascular changes without evidence of an acute transcortical infarction or hemorrhage  - CTA brain, CTA neck, CT perfusion: No core infarct. Possible brain at risk in left frontal lobe measuring 29 mL. Mild narrowing of the left MCA sylvian branch. No large vessel occlusion.   ICU for further evaluation and management   for CVA s/p TNK  - Goal -180, -   A1c 5.7  lipid profile  ldl 114 ,   - Repeat CTH at 24hrs post TNK-repeat ct head -   IMPRESSION: No acute intracranial hemorrhage.  Previously seen small acute/subacute infarcts within the left MCA   -  Speech and swallow eval- passed  on dash /tlc   -  started  on  ASA   and Plavix    s/p TNK  - PT and OT consultation- no need anjali .  - Neuro Dr. Rivera consulted  - Management per ICU

## 2024-03-21 NOTE — PROGRESS NOTE ADULT - NUTRITIONAL ASSESSMENT
This patient has been assessed with a concern for Malnutrition and has been determined to have a diagnosis/diagnoses of Moderate protein-calorie malnutrition.    This patient is being managed with:   Diet Regular-  DASH/TLC {Sodium & Cholesterol Restricted}  Entered: Mar 20 2024 11:56AM    The following pending diet order is being considered for treatment of Moderate protein-calorie malnutrition:  Diet DASH/TLC-  Sodium & Cholesterol Restricted  Supplement Feeding Modality:  Oral  Ensure Plus High Protein Cans or Servings Per Day:  1       Frequency:  Daily  Entered: Mar 21 2024  2:55PM  
This patient has been assessed with a concern for Malnutrition and has been determined to have a diagnosis/diagnoses of Moderate protein-calorie malnutrition.    This patient is being managed with:   Diet Regular-  DASH/TLC {Sodium & Cholesterol Restricted}  Entered: Mar 20 2024 11:56AM

## 2024-03-21 NOTE — SPEECH LANGUAGE PATHOLOGY EVALUATION - SLP DIAGNOSIS
The pt presented with mild expressive language deficits marked by impaired generative naming within abstract categories and word retrieval deficits at the conversational level. Receptive language deficits were noted marked by impaired auditory comprehension at the paragraph level. Problem solving and reasoning were judged to be WFL. STM deficits were appreciated. Pt was able to recall 3/3 words immediately and 2/3 words following a one-minute time interval.  Of note, pt required increased processing time to complete tasks.  Pt's voice was judged to be hoarse. Per the pt and the pt's daughter, the pt has a hx of vocal hoarseness though they believe it is exacerbated at this time. Motor speech was judged to be functional. However, pt's daughter stated that the pt's speech becomes imprecise when she is tired.

## 2024-03-21 NOTE — PROGRESS NOTE ADULT - ASSESSMENT
84 yo female with hx RA, COPD (former smoker), GERD, macular degeneration, migraine headaches, recent TIA diagnosed at [facility] on 3/17, presents for word finding difficulty and difficulty speaking, admitted for CVA.    #CVA  - Pt admitted and found to have infarcts in the L MCA territory  - s/p TNK with improvement in symptoms, residual speech deficit present  - EKG NSR @ 79bpm  - c/w asa and plavix, statin  - tte with EF 55-60%, RA mildly dilated, mild MR, mod AR, mod-severe TR  - hx of one episode of palpitations 20 years ago, wore monitor however was negative at that time  - recommend outpatient cardiology follow up for possible loop recorder to assess for presence of arrythmia and need for AC    - troponin neg, no clear evidence of acute ischemia  - BP well controlled, monitor routine hemodynamics.  - Monitor and replete lytes, keep K>4, Mg>2.    - Other cardiovascular workup will depend on clinical course.  - All other workup per primary team.  - Will continue to follow.

## 2024-03-21 NOTE — DISCHARGE NOTE PROVIDER - HOSPITAL COURSE
HPI:  Patient is an 82 yo female with hx RA, COPD (former smoker), GERD, macular degeneration, migraine headaches, recent TIA diagnosed at [facility] on 3/17, presents for word finding difficulty and difficulty speaking. On 3/17 she was brought in by family for episode of approx 1 min 20 sec of expressive aphasia and facial drooping noted by daughter while eating dinner. This completely resolved prior to arrival to [place] ED. NIH stroke scale 0. She was to be admitted for TIA workup, but the pt signed out AMA. She was instructed to f/u with Neuro Dr. Lundberg and Vascular Neurologist Dr. Abraham. Then today, she was in her usual state of health, but while driving to her children's home at 7:45AM, she had sudden onset of feeling fatigued.  She noticed when she got into the house that she was having trouble with word finding and difficulty speaking. This lasted for about 30 minutes. EMS was called, and patient was brought to the emergency room for evaluation.  Patient provided the entirety of this history, and her speech is returned to almost normal. However, as her time in the ED progressed, patients symptoms returned, and she was unable to speak. She had an NIHSS of 4 for R facial weakness, trace dysarthria, and difficulty with word finding/anomia. Code stroke was called, and patient was found to have a calcific density in the left MCA insula branch. She was given TNK, and patient returned back to baseline. Upon exam, patient started to have difficulty finding words again. She was able to follow commands appropriately, strength was equal and intact in all 4 extremities and there was no pronator drift appreciated, However, a mild R facial droop was observed. Prior to aphasia, patient denied any headache, dizziness, blurred vision, chest pain, palpitations, SOB, n/v, dysuria, generalized pain.    ED COURSE:  Vitals: HR 84, /84, RR 16, SpO2 100 % on 2 L NC  Labs significant for: Cr 0.58, Trop 13.  EKbpm, NSR   Given: Tenecteplase 12 g IV    Imaging:  CT head noncontrast:   New calcific density in the left MCA insula branch may suggest embolic calcific plaque. Mild chronic microvascular changes without evidence of an acute transcortical infarction or hemorrhage  CTA brain, CTA neck, CT perfusion: No core infarct. Possible brain at risk in left frontal lobe measuring 29 mL. Mild narrowing of the left MCA sylvian branch. No large vessel occlusion. Consider MRI of the brain for further evaluation. (19 Mar 2024 10:05)      ---  HOSPITAL COURSE: Patient admitted to medicine floor for management of     Pt seen and examined on day of discharge. Patient is medically optimized for discharge to home with close outpatient followup.    PHYSICAL EXAM ON DAY OF DISCHARGE:  The patient was seen and examined on the day of discharge. Please see progress note from day of discharge for further information.         ---  CONSULTANTS:     ---  TIME SPENT:  I, the attending physician, was physically present for the key portions of the evaluation and management (E/M) service provided. The total amount of time spent reviewing the hospital notes, laboratory values, imaging findings, assessing/counseling the patient, discussing with consultant physicians, social work, nursing staff was -- minutes    ---  Primary care provider was made aware of plan for discharge:      [  ] NO     [  ] YES   HPI:  Patient is an 84 yo female with hx RA, COPD (former smoker), GERD, macular degeneration, migraine headaches, recent TIA diagnosed at [facility] on 3/17, presents for word finding difficulty and difficulty speaking. On 3/17 she was brought in by family for episode of approx 1 min 20 sec of expressive aphasia and facial drooping noted by daughter while eating dinner. This completely resolved prior to arrival to [place] ED. NIH stroke scale 0. She was to be admitted for TIA workup, but the pt signed out AMA. She was instructed to f/u with Neuro Dr. Lundberg and Vascular Neurologist Dr. Abraham. Then today, she was in her usual state of health, but while driving to her children's home at 7:45AM, she had sudden onset of feeling fatigued.  She noticed when she got into the house that she was having trouble with word finding and difficulty speaking. This lasted for about 30 minutes. EMS was called, and patient was brought to the emergency room for evaluation.  Patient provided the entirety of this history, and her speech is returned to almost normal. However, as her time in the ED progressed, patients symptoms returned, and she was unable to speak. She had an NIHSS of 4 for R facial weakness, trace dysarthria, and difficulty with word finding/anomia. Code stroke was called, and patient was found to have a calcific density in the left MCA insula branch. She was given TNK, and patient returned back to baseline. Upon exam, patient started to have difficulty finding words again. She was able to follow commands appropriately, strength was equal and intact in all 4 extremities and there was no pronator drift appreciated, However, a mild R facial droop was observed. Prior to aphasia, patient denied any headache, dizziness, blurred vision, chest pain, palpitations, SOB, n/v, dysuria, generalized pain.    ED COURSE:  Vitals: HR 84, /84, RR 16, SpO2 100 % on 2 L NC  Labs significant for: Cr 0.58, Trop 13.  EKbpm, NSR   Given: Tenecteplase 12 g IV    Imaging:  CT head noncontrast:   New calcific density in the left MCA insula branch may suggest embolic calcific plaque. Mild chronic microvascular changes without evidence of an acute transcortical infarction or hemorrhage  CTA brain, CTA neck, CT perfusion: No core infarct. Possible brain at risk in left frontal lobe measuring 29 mL. Mild narrowing of the left MCA sylvian branch. No large vessel occlusion. Consider MRI of the brain for further evaluation. (19 Mar 2024 10:05)      ---  HOSPITAL COURSE:  Patient admitted to ICU for management of acute CVA s/p Tenecteplase in the ED. On admission pt found to be aphasic with significant L sided facial droop. Initial non-con CT head demonstrated no acute process. Repeat CT head non con 24h after TNK continued to show no evidence of acute CVA.   MRI head demonstrated multiple subacute L MCA infarcts. Carotid U/S revealed no significant stenosis. ASA & Plavix initiated 24-hours post TNK. Neurological deficits fully resolved, returned to baseline status, full and equal strength. Lipid panel significant for T-Chol 213, . Initiated atorvastatin. A1c resulted 5.7. TTE showed EF 55-60% with normal left ventricular systolic function, mild mitral regurgitation with moderate aortic regurgitation and severe tricuspid regurgitation. Cardiology recommends outpatient followup for mild aortic insufficiency and repeat echo to reassess pulmonary artery pressures.     Physical therapy evaluated the patient, determined no skilled PT needs, recommended discharge disposition: home.  Occupational therapy evaluated the patient, determined no skilled OT needs, recommends shower chair for energy conservation and safety discussed with family at bedside.  Speech and swallow evaluated the patient, recommending regular diet/food and liquid consistency.    Pt seen and examined on day of discharge. Patient is medically optimized for discharge to home with close outpatient followup.        Pt seen and examined on day of discharge. Patient is medically optimized for discharge to home with close outpatient followup.    PHYSICAL EXAM ON DAY OF DISCHARGE:  The patient was seen and examined on the day of discharge. Please see progress note from day of discharge for further information.     General: well appearing, NAD  CNS: AAO x3. No dysarthria, no facial droop. No deficits in sensation to fine touch over B/L UE, LE.  MSK: 5/5 and symmetric strength in B/L UE and LE.   HEENT: PERRL, EOMI, sclerae/conjunctivae clear.  Resp: CTA BL  CVS: S1S2, RRR, no murmurs appreciated  Abd: NDNT  Ext: No calf tenderness BL, no c/c/e x4 extremities.  Skin: Warm and dry, color normal.        ---  CONSULTANTS:   Neurology: Dr. Rivera  Cardiology:   [facility] Medicine:   Critical Care: Dr. Garcia    ---  TIME SPENT:  I, the attending physician, was physically present for the key portions of the evaluation and management (E/M) service provided. The total amount of time spent reviewing the hospital notes, laboratory values, imaging findings, assessing/counseling the patient, discussing with consultant physicians, social work, nursing staff was -- minutes    ---  Primary care provider was made aware of plan for discharge:      [  ] NO     [  ] YES   HPI:  Patient is an 82 yo female with hx RA, COPD (former smoker), GERD, macular degeneration, migraine headaches, recent TIA diagnosed at [facility] on 3/17, presents for word finding difficulty and difficulty speaking. On 3/17 she was brought in by family for episode of approx 1 min 20 sec of expressive aphasia and facial drooping noted by daughter while eating dinner. This completely resolved prior to arrival to [place] ED. NIH stroke scale 0. She was to be admitted for TIA workup, but the pt signed out AMA. She was instructed to f/u with Neuro Dr. Lundberg and Vascular Neurologist Dr. Abraham. Then today, she was in her usual state of health, but while driving to her children's home at 7:45AM, she had sudden onset of feeling fatigued.  She noticed when she got into the house that she was having trouble with word finding and difficulty speaking. This lasted for about 30 minutes. EMS was called, and patient was brought to the emergency room for evaluation.  Patient provided the entirety of this history, and her speech is returned to almost normal. However, as her time in the ED progressed, patients symptoms returned, and she was unable to speak. She had an NIHSS of 4 for R facial weakness, trace dysarthria, and difficulty with word finding/anomia. Code stroke was called, and patient was found to have a calcific density in the left MCA insula branch. She was given TNK, and patient returned back to baseline. Upon exam, patient started to have difficulty finding words again. She was able to follow commands appropriately, strength was equal and intact in all 4 extremities and there was no pronator drift appreciated, However, a mild R facial droop was observed. Prior to aphasia, patient denied any headache, dizziness, blurred vision, chest pain, palpitations, SOB, n/v, dysuria, generalized pain.    ED COURSE:  Vitals: HR 84, /84, RR 16, SpO2 100 % on 2 L NC  Labs significant for: Cr 0.58, Trop 13.  EKbpm, NSR   Given: Tenecteplase 12 g IV    Imaging:  CT head noncontrast:   New calcific density in the left MCA insula branch may suggest embolic calcific plaque. Mild chronic microvascular changes without evidence of an acute transcortical infarction or hemorrhage  CTA brain, CTA neck, CT perfusion: No core infarct. Possible brain at risk in left frontal lobe measuring 29 mL. Mild narrowing of the left MCA sylvian branch. No large vessel occlusion. Consider MRI of the brain for further evaluation. (19 Mar 2024 10:05)      ---  HOSPITAL COURSE:  Patient admitted to ICU for management of acute CVA s/p Tenecteplase in the ED. On admission pt found to be aphasic with significant L sided facial droop. Initial non-con CT head demonstrated no acute process. Repeat CT head non con 24h after TNK continued to show no evidence of acute CVA.   MRI head demonstrated multiple subacute L MCA infarcts. Carotid U/S revealed no significant stenosis. ASA & Plavix initiated 24-hours post TNK. Neurological deficits fully resolved, returned to baseline status, full and equal strength. Lipid panel significant for T-Chol 213, . Initiated atorvastatin. A1c resulted 5.7. TTE showed EF 55-60% with normal left ventricular systolic function, mild mitral regurgitation with moderate aortic regurgitation and severe tricuspid regurgitation. Cardiology recommends outpatient followup for mild aortic insufficiency and repeat echo to reassess pulmonary artery pressures.     Physical therapy evaluated the patient, determined no skilled PT needs, recommended discharge disposition: home.  Occupational therapy evaluated the patient, determined no skilled OT needs, recommends shower chair for energy conservation and safety discussed with family at bedside.  Speech and swallow evaluated the patient, recommending regular diet/food and liquid consistency.    Pt seen and examined on day of discharge. Patient is medically optimized for discharge to home with close outpatient followup.      PHYSICAL EXAM ON DAY OF DISCHARGE:    ICU Vital Signs Last 24 Hrs  T(C): 36.7 (21 Mar 2024 11:48), Max: 36.8 (20 Mar 2024 20:28)  T(F): 98.1 (21 Mar 2024 11:48), Max: 98.3 (20 Mar 2024 20:28)  HR: 88 (21 Mar 2024 10:00) (65 - 115)  BP: 102/56 (21 Mar 2024 10:00) (98/53 - 157/71)  BP(mean): 75 (21 Mar 2024 10:00) (73 - 102)  RR: 22 (21 Mar 2024 10:00) (10 - 35)  SpO2: 98% (21 Mar 2024 10:00) (93% - 100%)    O2 Parameters below as of 21 Mar 2024 07:00  Patient On (Oxygen Delivery Method): room air    General: well appearing, NAD  CNS: AAO x3. No dysarthria, no facial droop. No deficits in sensation to fine touch over B/L UE, LE.  MSK: 5/5 and symmetric strength in B/L UE and LE.   HEENT: PERRL, EOMI, sclerae/conjunctivae clear.  Resp: CTA BL  CVS: S1S2, RRR, no murmurs appreciated  Abd: NDNT x4 quadrants  Ext: No calf tenderness BL, no c/c/e x4 extremities.  Skin: Warm and dry, color normal.        ---  CONSULTANTS:   Neurology: Dr. Rivera  Cardiology: Dr. Newton  [facility] Medicine: Dr. Ledesma  Critical Care: Dr. Garcia    ---  TIME SPENT:  I, the attending physician, was physically present for the key portions of the evaluation and management (E/M) service provided. The total amount of time spent reviewing the hospital notes, laboratory values, imaging findings, assessing/counseling the patient, discussing with consultant physicians, social work, nursing staff was -- minutes    ---  Primary care provider was made aware of plan for discharge:      [  ] NO     [  ] YES

## 2024-03-21 NOTE — DIETITIAN INITIAL EVALUATION ADULT - ADD RECOMMEND
1) Continue current diet as tolerated; assistance/encouragement at meal times as needed  2) Recommend ensure plus HP daily (350kcal, 20gm protein per 8 fl oz serving); strawberry flavor  3) Monitor po intake, diet tolerance, weight trends, labs, GI function, skin integrity

## 2024-03-21 NOTE — DIETITIAN INITIAL EVALUATION ADULT - OTHER INFO
Pt is a "82 yo woman with Hx COPD, spontaneous PTX, RA on prednisone, recent migraines with episode TIA on 3/17 and now presenting with 3/19 with acute onset aphasia, s/p TNK.  MRI confirms acute and subacute L MCA infarcts."    Visited pt at bedside this am. Pt reports fair appetite/intake PTA. Fairly good intake thus far in house. Ate well for dinner last night and breakfast this am. Tolerating diet well. No food allergies. Denies chewing/swallowing difficulties. Denies N/V. +BM this am (3/21) per pt report. Bedscale wt of 101# obtained. Pt reports UBW of ~110# x 3 months ago. No edema noted. Skin: ecchymotic. Pt currently on DASH/TLC diet. Food preferences obtained to optimize po intake/tolerance. Pt agreeable to receive ensure plus HP daily for additional kcal/protein (strawberry flavor). RD remains available and will continue to follow-up.  Pt is a "82 yo woman with Hx COPD, spontaneous PTX, RA on prednisone, recent migraines with episode TIA on 3/17 and now presenting with 3/19 with acute onset aphasia, s/p TNK.  MRI confirms acute and subacute L MCA infarcts."    Visited pt at bedside this am. Pt reports fair appetite/intake PTA. Fairly good intake thus far in house. Ate well for dinner last night and breakfast this am. Tolerating diet well. No food allergies. Denies chewing/swallowing difficulties. Swallow evaluation 3/20; SLP recommended regular diet with thin liquids. Denies N/V. +BM this am (3/21) per pt report. Bedscale wt of 101# obtained. Pt reports UBW of ~110# x 3 months ago. No edema noted. Skin: ecchymotic. Pt currently on DASH/TLC diet. Food preferences obtained to optimize po intake/tolerance. Pt agreeable to receive ensure plus HP daily for additional kcal/protein (strawberry flavor). RD remains available and will continue to follow-up.

## 2024-03-21 NOTE — DISCHARGE NOTE PROVIDER - NSDCFUSCHEDAPPT_GEN_ALL_CORE_FT
Devante Abraham  [facility]  NEUROLOGY [address]  Scheduled Appointment: 03/25/2024    Callum Orellana  [facility]  NEUROLOGY [address]  Scheduled Appointment: 04/10/2024     Devante Abraham  [facility] Physician [facility]  NEUROLOGY [address]  Scheduled Appointment: 03/25/2024    Callum Orellana  [facility]  NEUROLOGY [address]  Scheduled Appointment: 04/12/2024

## 2024-03-21 NOTE — SPEECH LANGUAGE PATHOLOGY EVALUATION - SLP GENERAL OBSERVATIONS
The pt was received at bedside with her daughter present. Pt was awake, alert, and in NAD. Following the evaluation, the pt was awake, alert, and in NAD. Evaluation results were discussed with the pt and her daughter at which time they provided verbal understanding. Pt was left with her daughter at bedside.

## 2024-03-21 NOTE — DIETITIAN INITIAL EVALUATION ADULT - PROBLEM SELECTOR PLAN 1
Code stroke called for NIHSS 4: R facial weakness, trace dysarthria, and difficulty with word finding/anomia. S/p TNK   - CT head noncon:  New calcific density in the left MCA insula branch may suggest embolic calcific plaque. Mild chronic microvascular changes without evidence of an acute transcortical infarction or hemorrhage  - CTA brain, CTA neck, CT perfusion: No core infarct. Possible brain at risk in left frontal lobe measuring 29 mL. Mild narrowing of the left MCA sylvian branch. No large vessel occlusion.   - Admit to ICU for further evaluation and management for CVA s/p TNK  - Goal -180, keep diastolic <105  - F/u TTE  - F/u MRA/MRI head/neck (of note, patient has severe anxiety about MRIs, will likely need sedation)  - F/u A1c, lipid profile, TSH   - Aspiration, seizure, fall precaution  - Neuro checks every hour   - Repeat CTH at 24hrs post TNK  - Would recommend strict NPO & IVF until passes bedside dysphagia screen   - FU Speech and swallow eval --> start statin after dysphagia screen  - Hold ASA until cleared by neurology s/p TNK  - PT and OT consultation  - Neuro Dr. Rivera consulted  - Management per ICU

## 2024-03-21 NOTE — DISCHARGE NOTE PROVIDER - CARE PROVIDERS DIRECT ADDRESSES
[email or web address]

## 2024-03-21 NOTE — DISCHARGE NOTE NURSING/CASE MANAGEMENT/SOCIAL WORK - PATIENT PORTAL LINK FT
You can access the FollowMyHealth Patient Portal offered by [facility] by registering at the following website: [email or web address]. By joining [facility]’s FollowMyHealth portal, you will also be able to view your health information using other applications (apps) compatible with our system.

## 2024-03-21 NOTE — DISCHARGE NOTE PROVIDER - NSDCMRMEDTOKEN_GEN_ALL_CORE_FT
famotidine 20 mg oral tablet: 1 tab(s) orally 2 times a day  predniSONE 1 mg oral tablet: 2 tab(s) orally once a day  sodium chloride 1 g oral tablet: 1 tab(s) orally once a day  tocilizumab 20 mg/mL intravenous solution: 192 milligram(s) intravenously once a month *Last infusion was 3/4/24*   Aspirin Low Dose 81 mg oral tablet, chewable: 1 tab(s) orally once a day  famotidine 20 mg oral tablet: 1 tab(s) orally 2 times a day  Lipitor 80 mg oral tablet: 1 tab(s) orally once a day (at bedtime)  Plavix 75 mg oral tablet: 1 tab(s) orally once a day  predniSONE 1 mg oral tablet: 2 tab(s) orally once a day  sodium chloride 1 g oral tablet: 1 tab(s) orally once a day  tocilizumab 20 mg/mL intravenous solution: 192 milligram(s) intravenously once a month *Last infusion was 3/4/24*   Aspirin Low Dose 81 mg oral tablet, chewable: 1 tab(s) orally once a day  famotidine 20 mg oral tablet: 1 tab(s) orally 2 times a day  Lipitor 80 mg oral tablet: 1 tab(s) orally once a day (at bedtime)  Plavix 75 mg oral tablet: 1 tab(s) orally once a day  predniSONE 1 mg oral tablet: 2 tab(s) orally once a day  sodium chloride 1 g oral tablet: 1 tab(s) orally once a day  Speech/Language Therapy: Speech Therapy for dysarthria after acute CVA  tocilizumab 20 mg/mL intravenous solution: 192 milligram(s) intravenously once a month *Last infusion was 3/4/24*

## 2024-03-21 NOTE — DISCHARGE NOTE PROVIDER - DETAILS OF MALNUTRITION DIAGNOSIS/DIAGNOSES
This patient has been assessed with a concern for Malnutrition and was treated during this hospitalization for the following Nutrition diagnosis/diagnoses:     -  03/21/2024: Moderate protein-calorie malnutrition

## 2024-03-21 NOTE — PROGRESS NOTE ADULT - TIME BILLING
84 yo female with hx RA, macular degeneration, migraine headaches, emphysema, former smoker, recent TIA diagnosed at [facility] on 3/17, presents for word finding difficulty and difficulty speaking. Found to have acute CVA,  s/p   TNK - on statin and asa /plavix . dc plan today.

## 2024-03-21 NOTE — PROGRESS NOTE ADULT - ASSESSMENT
82 yo female with hx RA, macular degeneration, migraine headaches, emphysema, former smoker, recent TIA diagnosed at [facility] on 3/17, presents for word finding difficulty and difficulty speaking. Found to have acute CVA, given TNK. Admit to ICU for further management.

## 2024-03-21 NOTE — PROGRESS NOTE ADULT - SUBJECTIVE AND OBJECTIVE BOX
Patient is a 83y old  Female who presents with a chief complaint of CVA (21 Mar 2024 11:30)    pt seen and examine today  pt feeling better  slurred speech resolved .   INTERVAL HPI/OVERNIGHT EVENTS:     T(C): 36.7 (03-21-24 @ 11:48), Max: 36.8 (03-20-24 @ 20:28)  HR: 88 (03-21-24 @ 10:00) (65 - 115)  BP: 102/56 (03-21-24 @ 10:00) (98/53 - 157/71)  RR: 22 (03-21-24 @ 10:00) (10 - 35)  SpO2: 98% (03-21-24 @ 10:00) (93% - 100%)  Wt(kg): --  I&O's Summary      REVIEW OF SYSTEMS:  CONSTITUTIONAL: No fever, weight loss, or fatigue  EYES: No eye pain, visual disturbances, or discharge  ENMT:  No difficulty hearing, tinnitus, vertigo; No sinus or throat pain  NECK: No pain or stiffness  BREASTS: No pain, no masses  RESPIRATORY: No cough, wheezing, chills  No shortness of breath  CARDIOVASCULAR: No chest pain, palpitations, dizziness, or leg swelling  GASTROINTESTINAL: No abdominal or epigastric pain. No nausea, vomiting,  No diarrhea or constipation.   GENITOURINARY: No dysuria, frequency, hematuria, or incontinence  NEUROLOGICAL: No headaches, memory loss, loss of strength, numbness, or tremors  SKIN: No itching, burning, rashes, or lesions   MUSCULOSKELETAL: No joint pain or swelling; No muscle, back, or extremity pain    PHYSICAL EXAM:  GENERAL: NAD,   HEAD:  Atraumatic, Normocephalic  EYES: EOMI, PERRLA, conjunctiva and sclera clear  ENMT:  Moist mucous membranes  NECK: Supple, No JVD  NERVOUS SYSTEM:  Alert & Oriented X3; Motor Strength 5/5 B/L upper and lower extremities; DTRs 2+ intact and symmetric  CHEST/LUNG:   percussion bilaterally; No rales, rhonchi, wheezing,   HEART: Regular rate and rhythm; No murmurs,  no tachy   ABDOMEN: Soft, Nontender, Nondistended; Bowel sounds present  EXTREMITIES:  2+ Peripheral Pulses, No clubbing, cyanosis, or edema  SKIN: No rashes or lesions        MEDICATIONS  (STANDING):  aspirin  chewable 81 milliGRAM(s) Oral daily  atorvastatin 80 milliGRAM(s) Oral at bedtime  clopidogrel Tablet 75 milliGRAM(s) Oral daily  dextrose 5%. 1000 milliLiter(s) (100 mL/Hr) IV Continuous <Continuous>  enoxaparin Injectable 40 milliGRAM(s) SubCutaneous every 24 hours  famotidine    Tablet 20 milliGRAM(s) Oral two times a day  glucagon  Injectable 1 milliGRAM(s) IntraMuscular once  insulin lispro (ADMELOG) corrective regimen sliding scale   SubCutaneous three times a day before meals  insulin lispro (ADMELOG) corrective regimen sliding scale   SubCutaneous at bedtime  predniSONE   Tablet 2 milliGRAM(s) Oral daily    MEDICATIONS  (PRN):      LABS:                        13.3   5.30  )-----------( 221      ( 21 Mar 2024 05:25 )             39.5     03-21    141  |  106  |  13  ----------------------------<  88  4.0   |  30  |  0.61    Ca    8.9      21 Mar 2024 05:25  Phos  3.4     03-21  Mg     1.9     03-21    TPro  6.7  /  Alb  3.1<L>  /  TBili  0.9  /  DBili  x   /  AST  28  /  ALT  27  /  AlkPhos  73  03-21    PT/INR - ( 20 Mar 2024 05:20 )   PT: 11.6 sec;   INR: 0.99 ratio         PTT - ( 20 Mar 2024 05:20 )  PTT:26.1 sec  Urinalysis Basic - ( 21 Mar 2024 05:25 )    Color: x / Appearance: x / SG: x / pH: x  Gluc: 88 mg/dL / Ketone: x  / Bili: x / Urobili: x   Blood: x / Protein: x / Nitrite: x   Leuk Esterase: x / RBC: x / WBC x   Sq Epi: x / Non Sq Epi: x / Bacteria: x      CAPILLARY BLOOD GLUCOSE      POCT Blood Glucose.: 101 mg/dL (21 Mar 2024 11:43)  POCT Blood Glucose.: 133 mg/dL (21 Mar 2024 09:12)  POCT Blood Glucose.: 84 mg/dL (20 Mar 2024 21:46)  POCT Blood Glucose.: 145 mg/dL (20 Mar 2024 18:39)              RADIOLOGY & ADDITIONAL TESTS:    Imaging Personally Reviewed:   no new test     Advance Directives:  full code

## 2024-03-21 NOTE — DIETITIAN INITIAL EVALUATION ADULT - NSICDXPASTMEDICALHX_GEN_ALL_CORE_FT
PAST MEDICAL HISTORY:  Brain TIA     GERD (gastroesophageal reflux disease)     H/O emphysema     Macular degeneration     Migraine headache     Rheumatoid arthritis

## 2024-03-21 NOTE — PROGRESS NOTE ADULT - ASSESSMENT
84 yo woman with Hx COPD, spontaneous PTX, RA on prednisone, recent migraines with episode TIA on 3/17 and now presenting with 3/19 with acute onset aphasia, s/p TNK.  MRI confirms acute and subacute L MCA infarcts.    --acute CVA with acute/subacute scattered infarcts in L MCA territory, much improved  cont ASA and plavix   high dose statin, hyperlipidemia noted  BP acceptable  cleared by PT, OT; will do outpt speech therapy  --echo with moderate AI, mod-sev TR, with normal RV, unclear etology  outpt cards f/u, possible ILR  --stable respiratory status  --normal renal function  --PO diet  --RA on tocilizumab, contiinue home prednisone 2mg  --no infectious concerns.  --lovenox ppx  --stable for d/c home today  --discussed with neuro  --discussed with pt and daughter

## 2024-03-21 NOTE — DIETITIAN INITIAL EVALUATION ADULT - PERTINENT LABORATORY DATA
03-21    141  |  106  |  13  ----------------------------<  88  4.0   |  30  |  0.61    Ca    8.9      21 Mar 2024 05:25  Phos  3.4     03-21  Mg     1.9     03-21    TPro  6.7  /  Alb  3.1<L>  /  TBili  0.9  /  DBili  x   /  AST  28  /  ALT  27  /  AlkPhos  73  03-21  POCT Blood Glucose.: 133 mg/dL (03-21-24 @ 09:12)  A1C with Estimated Average Glucose Result: 5.7 % (03-20-24 @ 05:20)

## 2024-03-21 NOTE — PROGRESS NOTE ADULT - SUBJECTIVE AND OBJECTIVE BOX
Interval events: no events overnight  feels well, no difficulty ambulating, feels ready to go home  feels speech nearly normal    T(F): 98.1 (03-21-24 @ 11:48), Max: 98.1 (03-21-24 @ 11:48)  HR: 83 (03-21-24 @ 17:30) (65 - 97)  BP: 146/75 (03-21-24 @ 17:30) (98/53 - 157/71)  RR: 28 (03-21-24 @ 17:30) (10 - 34)  SpO2: 98% (03-21-24 @ 17:30) (93% - 100%)  Wt(kg): --        CAPILLARY BLOOD GLUCOSE      POCT Blood Glucose.: 111 mg/dL (21 Mar 2024 16:40)      I&O's Detail      Physical Exam:   Gen: well appearing woman, sitting at edge of bed  Neuro: alert, clear and fluent speech, no facial droop, tongue deviated minimally to R  strength in UE and LE 5/5 b/l  HEENT: PERRL  Resp: CTABL  CVS: RRR  Abd: soft, NTND  Ext: no edema   Skin: WWP    Meds:      atorvastatin Oral  glucagon  Injectable IntraMuscular  insulin lispro (ADMELOG) corrective regimen sliding scale SubCutaneous  insulin lispro (ADMELOG) corrective regimen sliding scale SubCutaneous  predniSONE   Tablet Oral          aspirin  chewable Oral  clopidogrel Tablet Oral  enoxaparin Injectable SubCutaneous    famotidine    Tablet Oral      dextrose 5%. IV Continuous                                  13.3   5.30  )-----------( 221      ( 21 Mar 2024 05:25 )             39.5       03-21    141  |  106  |  13  ----------------------------<  88  4.0   |  30  |  0.61    Ca    8.9      21 Mar 2024 05:25  Phos  3.4     03-21  Mg     1.9     03-21    TPro  6.7  /  Alb  3.1<L>  /  TBili  0.9  /  DBili  x   /  AST  28  /  ALT  27  /  AlkPhos  73  03-21      CARDIAC MARKERS ( 20 Mar 2024 05:20 )  x     / x     / 138 U/L / x     / 2.9 ng/mL      PT/INR - ( 20 Mar 2024 05:20 )   PT: 11.6 sec;   INR: 0.99 ratio         PTT - ( 20 Mar 2024 05:20 )  PTT:26.1 sec  Urinalysis Basic - ( 21 Mar 2024 05:25 )    Color: x / Appearance: x / SG: x / pH: x  Gluc: 88 mg/dL / Ketone: x  / Bili: x / Urobili: x   Blood: x / Protein: x / Nitrite: x   Leuk Esterase: x / RBC: x / WBC x   Sq Epi: x / Non Sq Epi: x / Bacteria: x        Tubes/lines:m none

## 2024-03-21 NOTE — SPEECH LANGUAGE PATHOLOGY EVALUATION - COMMENTS
Mildly prolonged processing time required to complete tasks. Generative naming:   - WFL within concrete categories  -Impaired within abstract categories. "Patient is an 82 yo female with hx RA, COPD (former smoker), GERD, macular degeneration, migraine headaches, recent TIA diagnosed at [facility] on 3/17, presents for word finding difficulty and difficulty speaking. On 3/17 she was brought in by family for episode of approx 1 min 20 sec of expressive aphasia and facial drooping noted by daughter while eating dinner. This completely resolved prior to arrival to [place] ED. NIH stroke scale 0. She was to be admitted for TIA workup, but the pt signed out AMA. She was instructed to f/u with Neuro Dr. Lundberg and Vascular Neurologist Dr. Abraham. Then today, she was in her usual state of health, but while driving to her children's home at 7:45AM, she had sudden onset of feeling fatigued.  She noticed when she got into the house that she was having trouble with word finding and difficulty speaking. This lasted for about 30 minutes. EMS was called, and patient was brought to the emergency room for evaluation.  Patient provided the entirety of this history, and her speech is returned to almost normal. However, as her time in the ED progressed, patients symptoms returned, and she was unable to speak. She had an NIHSS of 4 for R facial weakness, trace dysarthria, and difficulty with word finding/anomia. Code stroke was called, and patient was found to have a calcific density in the left MCA insula branch. She was given TNK, and patient returned back to baseline."     MR Head 3/19/24:   IMPRESSION:  Scattered small acute/subacute infarcts in the left MCA territory.  No acute intracranial hemorrhage    X-ray Chest 3/19/24:  IMPRESSION: Diffuse moderate chronic interstitial lung disease.    Pt was evaluated by this service for a dysphagia evaluation on 3/20/21 at which time a regular diet with thin liquids was recommended. Please refer to complete report for further information.

## 2024-03-23 PROBLEM — G45.9 TRANSIENT CEREBRAL ISCHEMIC ATTACK, UNSPECIFIED: Chronic | Status: ACTIVE | Noted: 2024-03-19

## 2024-03-23 PROBLEM — G43.909 MIGRAINE, UNSPECIFIED, NOT INTRACTABLE, WITHOUT STATUS MIGRAINOSUS: Chronic | Status: ACTIVE | Noted: 2024-03-19

## 2024-03-23 PROBLEM — M06.9 RHEUMATOID ARTHRITIS, UNSPECIFIED: Chronic | Status: ACTIVE | Noted: 2024-03-19

## 2024-03-23 PROBLEM — H35.30 UNSPECIFIED MACULAR DEGENERATION: Chronic | Status: ACTIVE | Noted: 2024-03-19

## 2024-03-23 PROBLEM — J43.9 EMPHYSEMA, UNSPECIFIED: Chronic | Status: ACTIVE | Noted: 2024-03-19

## 2024-03-23 PROBLEM — K21.9 GASTRO-ESOPHAGEAL REFLUX DISEASE WITHOUT ESOPHAGITIS: Chronic | Status: ACTIVE | Noted: 2024-03-19

## 2024-03-24 ENCOUNTER — NON-APPOINTMENT (OUTPATIENT)
Age: 84
End: 2024-03-24

## 2024-03-25 ENCOUNTER — APPOINTMENT (OUTPATIENT)
Dept: NEUROLOGY | Facility: CLINIC | Age: 84
End: 2024-03-25
Payer: MEDICARE

## 2024-03-25 VITALS
SYSTOLIC BLOOD PRESSURE: 116 MMHG | HEART RATE: 87 BPM | HEIGHT: 63.5 IN | OXYGEN SATURATION: 96 % | BODY MASS INDEX: 17.5 KG/M2 | DIASTOLIC BLOOD PRESSURE: 77 MMHG | WEIGHT: 100 LBS

## 2024-03-25 PROCEDURE — 99205 OFFICE O/P NEW HI 60 MIN: CPT

## 2024-03-25 NOTE — PHYSICAL EXAM
[FreeTextEntry1] : GENERAL PHYSICAL EXAM: GEN: no distress, normal affect  NEUROLOGICAL EXAM: Mental Status Orientation: alert and oriented to person, place, time, and situation Language: clear and fluent, intact comprehension and repetition. No dysarthria.   Cranial Nerves II: visual fields full to confrontation III, IV, VI: PERRL, EOMI V, VII: facial sensation and movement intact and symmetric VIII: hearing intact IX, X: uvula midline, soft palate elevates normally XI: BL shoulder shrug intact XII: tongue midline   Motor Shoulder abduction: 5 (R), 5 (L) UE flexion: 5 (R), 5 (L) UE extension: 5 (R), 5 (L) Hand : 5 (R), 5 (L) Hip flexion: 5 (R), 5 (L) Knee flexion: 5 (R), 5 (L) Knee extension: 5 (R), 5 (L) Dorsiflexion: 5 (R), 5 (L) Plantar flexion: 5 (R), 5 (L)   Tone and bulk are normal in upper and lower limbs No pronator drift   Sensation Intact to light touch in all 4 EXTs  Coordination Normal FTN bilaterally Able to perform rapid, alternating movements   Gait Normal ambulation with no imbalance Negative Romberg [Over the Past 2 Weeks, Have You Felt Down, Depressed, or Hopeless?] : 1.) Over the past 2 weeks, have you felt down, depressed, or hopeless? No [Over the Past 2 Weeks, Have You Felt Little Interest or Pleasure Doing Things?] : 2.) Over the past 2 weeks, have you felt little interest or pleasure doing things? No

## 2024-03-25 NOTE — HISTORY OF PRESENT ILLNESS
[FreeTextEntry1] : Ms. Lowe is an 84 yo female with history of RA, COPD (former smoker), GERD, macular degeneration, migraine headache with aura, recent TIA diagnosed at [facility] on 3/17/24 after an episode of expressive aphasia and right facial droop who presented to [facility] on 3/19/24 with difficulty expressing herself and presents today for hospital follow up. She was originally brought to  by her family after an episode of difficulty expressing herself, lasting about 1.5 minutes before resolving completely. 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 She subsequently signed out AMA but presented to [facility] on 3/19/24. She states that she was driving when she began to feel sudden onset fatigue, subsequently developing garbled speech and difficulty expressing herself, lasting about 30 minutes. However, while in the ED, the patients symptoms returned, and she was unable to speak. She had an NIHSS of 4 for right facial weakness, trace dysarthria, and difficulty with word finding. CTH revealed a calcific density in the left MCA insula branch. She was given TNK and returned back to baseline, but then developed difficulty word finding again on exam. MRI head showed scattered small acute/subacute infarcts in the left MCA territory. Bilateral carotid US was unremarkable. TTE showed an EF of 55-60% with no LAE, PFO. She reports compliance with ASA/Plavix with no signs of bleeding. She is also on Crestor 40 mg daily, tolerating well. Since discharge, she feels fatigue and is slower getting her words out. She also notes slurred speech when she is tired. She is planning to start speech therapy. She is functionally independent, living alone. She is driving with no issues. Denies interval stroke/TIA symptoms. I have personally reviewed available neuroradiological images.

## 2024-03-25 NOTE — DISCUSSION/SUMMARY
[FreeTextEntry1] : Ms. Lowe is an 82 yo female with history of RA, COPD (former smoker), GERD, macular degeneration, migraine headache with aura now 8 days s/p TIA, subsequently signing out AMA and presenting to [facility] two days later, now 6 days s/p scattered small acute/subacute infarcts in the left MCA territory s/p TNK. Etiology ESUS. 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 Plan to continue ASA 81 mg daily and Plavix 75 mg daily for 1 month post stroke and then discontinue Plavix and continue on ASA monotherapy. Aggressive risk factor modification should be continued for secondary stroke prevention, consisting intensive blood pressure control and lipid lowering therapy. She has a follow up with her PCP tomorrow and will have lipids and A1c checked. I have reviewed the goals of stroke risk factor modification. I counseled the patient on measures to reduce stroke risk, including the importance of medication compliance, risk factor control, exercise, healthy diet, and avoidance of smoking. I recommend that she have a ILR placed to rule out AFIB. PT referral provided for overall deconditioning. She will follow up with me in 1 month. Patient was educated about signs and symptoms of stroke and was counseled to contact 911 upon emergence of stroke-like symptoms. She may resume driving and all of her previous activities. All of the patient's questions and concerns were addressed.

## 2024-04-04 ENCOUNTER — APPOINTMENT (OUTPATIENT)
Dept: CARDIOLOGY | Facility: CLINIC | Age: 84
End: 2024-04-04
Payer: MEDICARE

## 2024-04-04 ENCOUNTER — NON-APPOINTMENT (OUTPATIENT)
Age: 84
End: 2024-04-04

## 2024-04-04 VITALS
WEIGHT: 106 LBS | DIASTOLIC BLOOD PRESSURE: 79 MMHG | BODY MASS INDEX: 18.55 KG/M2 | HEART RATE: 84 BPM | SYSTOLIC BLOOD PRESSURE: 136 MMHG | OXYGEN SATURATION: 94 % | HEIGHT: 63.5 IN

## 2024-04-04 DIAGNOSIS — I35.1 NONRHEUMATIC AORTIC (VALVE) INSUFFICIENCY: ICD-10-CM

## 2024-04-04 DIAGNOSIS — I07.1 RHEUMATIC TRICUSPID INSUFFICIENCY: ICD-10-CM

## 2024-04-04 PROCEDURE — 93000 ELECTROCARDIOGRAM COMPLETE: CPT

## 2024-04-04 PROCEDURE — G2211 COMPLEX E/M VISIT ADD ON: CPT

## 2024-04-04 PROCEDURE — 99215 OFFICE O/P EST HI 40 MIN: CPT

## 2024-04-07 ENCOUNTER — EMERGENCY (EMERGENCY)
Facility: HOSPITAL | Age: 84
LOS: 1 days | Discharge: HOME | End: 2024-04-07
Attending: STUDENT IN AN ORGANIZED HEALTH CARE EDUCATION/TRAINING PROGRAM | Admitting: STUDENT IN AN ORGANIZED HEALTH CARE EDUCATION/TRAINING PROGRAM
Payer: MEDICARE

## 2024-04-07 VITALS
SYSTOLIC BLOOD PRESSURE: 135 MMHG | RESPIRATION RATE: 18 BRPM | OXYGEN SATURATION: 100 % | DIASTOLIC BLOOD PRESSURE: 63 MMHG | HEART RATE: 85 BPM | TEMPERATURE: 98 F

## 2024-04-07 VITALS
SYSTOLIC BLOOD PRESSURE: 114 MMHG | HEIGHT: 63 IN | WEIGHT: 102.96 LBS | HEART RATE: 120 BPM | OXYGEN SATURATION: 98 % | RESPIRATION RATE: 18 BRPM | TEMPERATURE: 97 F | DIASTOLIC BLOOD PRESSURE: 65 MMHG

## 2024-04-07 DIAGNOSIS — Z98.890 OTHER SPECIFIED POSTPROCEDURAL STATES: Chronic | ICD-10-CM

## 2024-04-07 DIAGNOSIS — Z98.49 CATARACT EXTRACTION STATUS, UNSPECIFIED EYE: Chronic | ICD-10-CM

## 2024-04-07 LAB
ALBUMIN SERPL ELPH-MCNC: 3.4 G/DL — SIGNIFICANT CHANGE UP (ref 3.3–5)
ALP SERPL-CCNC: 83 U/L — SIGNIFICANT CHANGE UP (ref 40–120)
ALT FLD-CCNC: 35 U/L — SIGNIFICANT CHANGE UP (ref 12–78)
ANION GAP SERPL CALC-SCNC: 9 MMOL/L — SIGNIFICANT CHANGE UP (ref 5–17)
AST SERPL-CCNC: 44 U/L — HIGH (ref 15–37)
BASOPHILS # BLD AUTO: 0.11 K/UL — SIGNIFICANT CHANGE UP (ref 0–0.2)
BASOPHILS NFR BLD AUTO: 1.7 % — SIGNIFICANT CHANGE UP (ref 0–2)
BILIRUB SERPL-MCNC: 0.4 MG/DL — SIGNIFICANT CHANGE UP (ref 0.2–1.2)
BUN SERPL-MCNC: 11 MG/DL — SIGNIFICANT CHANGE UP (ref 7–23)
CALCIUM SERPL-MCNC: 8.8 MG/DL — SIGNIFICANT CHANGE UP (ref 8.5–10.1)
CHLORIDE SERPL-SCNC: 106 MMOL/L — SIGNIFICANT CHANGE UP (ref 96–108)
CO2 SERPL-SCNC: 25 MMOL/L — SIGNIFICANT CHANGE UP (ref 22–31)
CREAT SERPL-MCNC: 0.53 MG/DL — SIGNIFICANT CHANGE UP (ref 0.5–1.3)
EGFR: 93 ML/MIN/1.73M2 — SIGNIFICANT CHANGE UP
EOSINOPHIL # BLD AUTO: 0.08 K/UL — SIGNIFICANT CHANGE UP (ref 0–0.5)
EOSINOPHIL NFR BLD AUTO: 1.3 % — SIGNIFICANT CHANGE UP (ref 0–6)
GLUCOSE SERPL-MCNC: 142 MG/DL — HIGH (ref 70–99)
HCT VFR BLD CALC: 41.1 % — SIGNIFICANT CHANGE UP (ref 34.5–45)
HGB BLD-MCNC: 13.6 G/DL — SIGNIFICANT CHANGE UP (ref 11.5–15.5)
IMM GRANULOCYTES NFR BLD AUTO: 0.3 % — SIGNIFICANT CHANGE UP (ref 0–0.9)
LYMPHOCYTES # BLD AUTO: 1.82 K/UL — SIGNIFICANT CHANGE UP (ref 1–3.3)
LYMPHOCYTES # BLD AUTO: 28.9 % — SIGNIFICANT CHANGE UP (ref 13–44)
MAGNESIUM SERPL-MCNC: 2.2 MG/DL — SIGNIFICANT CHANGE UP (ref 1.6–2.6)
MCHC RBC-ENTMCNC: 31.4 PG — SIGNIFICANT CHANGE UP (ref 27–34)
MCHC RBC-ENTMCNC: 33.1 GM/DL — SIGNIFICANT CHANGE UP (ref 32–36)
MCV RBC AUTO: 94.9 FL — SIGNIFICANT CHANGE UP (ref 80–100)
MONOCYTES # BLD AUTO: 0.48 K/UL — SIGNIFICANT CHANGE UP (ref 0–0.9)
MONOCYTES NFR BLD AUTO: 7.6 % — SIGNIFICANT CHANGE UP (ref 2–14)
NEUTROPHILS # BLD AUTO: 3.79 K/UL — SIGNIFICANT CHANGE UP (ref 1.8–7.4)
NEUTROPHILS NFR BLD AUTO: 60.2 % — SIGNIFICANT CHANGE UP (ref 43–77)
NRBC # BLD: 0 /100 WBCS — SIGNIFICANT CHANGE UP (ref 0–0)
PHOSPHATE SERPL-MCNC: 3.7 MG/DL — SIGNIFICANT CHANGE UP (ref 2.5–4.5)
PLATELET # BLD AUTO: 296 K/UL — SIGNIFICANT CHANGE UP (ref 150–400)
POTASSIUM SERPL-MCNC: 4.3 MMOL/L — SIGNIFICANT CHANGE UP (ref 3.5–5.3)
POTASSIUM SERPL-SCNC: 4.3 MMOL/L — SIGNIFICANT CHANGE UP (ref 3.5–5.3)
PROT SERPL-MCNC: 7.2 G/DL — SIGNIFICANT CHANGE UP (ref 6–8.3)
RBC # BLD: 4.33 M/UL — SIGNIFICANT CHANGE UP (ref 3.8–5.2)
RBC # FLD: 13.2 % — SIGNIFICANT CHANGE UP (ref 10.3–14.5)
SODIUM SERPL-SCNC: 140 MMOL/L — SIGNIFICANT CHANGE UP (ref 135–145)
T3 SERPL-MCNC: 110 NG/DL — SIGNIFICANT CHANGE UP (ref 80–200)
T4 FREE SERPL-MCNC: 1 NG/DL — SIGNIFICANT CHANGE UP (ref 0.9–1.8)
TROPONIN I, HIGH SENSITIVITY RESULT: 8.8 NG/L — SIGNIFICANT CHANGE UP
TSH SERPL-MCNC: 0.24 UIU/ML — LOW (ref 0.36–3.74)
WBC # BLD: 6.3 K/UL — SIGNIFICANT CHANGE UP (ref 3.8–10.5)
WBC # FLD AUTO: 6.3 K/UL — SIGNIFICANT CHANGE UP (ref 3.8–10.5)

## 2024-04-07 PROCEDURE — 36415 COLL VENOUS BLD VENIPUNCTURE: CPT

## 2024-04-07 PROCEDURE — 71045 X-RAY EXAM CHEST 1 VIEW: CPT

## 2024-04-07 PROCEDURE — 84484 ASSAY OF TROPONIN QUANT: CPT

## 2024-04-07 PROCEDURE — 96360 HYDRATION IV INFUSION INIT: CPT

## 2024-04-07 PROCEDURE — 84443 ASSAY THYROID STIM HORMONE: CPT

## 2024-04-07 PROCEDURE — 83735 ASSAY OF MAGNESIUM: CPT

## 2024-04-07 PROCEDURE — 84100 ASSAY OF PHOSPHORUS: CPT

## 2024-04-07 PROCEDURE — 93005 ELECTROCARDIOGRAM TRACING: CPT

## 2024-04-07 PROCEDURE — 85025 COMPLETE CBC W/AUTO DIFF WBC: CPT

## 2024-04-07 PROCEDURE — 84480 ASSAY TRIIODOTHYRONINE (T3): CPT

## 2024-04-07 PROCEDURE — 84439 ASSAY OF FREE THYROXINE: CPT

## 2024-04-07 PROCEDURE — 80053 COMPREHEN METABOLIC PANEL: CPT

## 2024-04-07 PROCEDURE — 93010 ELECTROCARDIOGRAM REPORT: CPT

## 2024-04-07 PROCEDURE — 99285 EMERGENCY DEPT VISIT HI MDM: CPT

## 2024-04-07 PROCEDURE — 99285 EMERGENCY DEPT VISIT HI MDM: CPT | Mod: 25

## 2024-04-07 PROCEDURE — 71045 X-RAY EXAM CHEST 1 VIEW: CPT | Mod: 26

## 2024-04-07 RX ORDER — METOPROLOL TARTRATE 50 MG
1 TABLET ORAL
Qty: 14 | Refills: 0
Start: 2024-04-07 | End: 2024-04-20

## 2024-04-07 RX ORDER — METOPROLOL TARTRATE 50 MG
25 TABLET ORAL ONCE
Refills: 0 | Status: COMPLETED | OUTPATIENT
Start: 2024-04-07 | End: 2024-04-07

## 2024-04-07 RX ORDER — SODIUM CHLORIDE 9 MG/ML
1000 INJECTION INTRAMUSCULAR; INTRAVENOUS; SUBCUTANEOUS ONCE
Refills: 0 | Status: COMPLETED | OUTPATIENT
Start: 2024-04-07 | End: 2024-04-07

## 2024-04-07 RX ADMIN — SODIUM CHLORIDE 1000 MILLILITER(S): 9 INJECTION INTRAMUSCULAR; INTRAVENOUS; SUBCUTANEOUS at 18:00

## 2024-04-07 RX ADMIN — Medication 25 MILLIGRAM(S): at 18:05

## 2024-04-07 RX ADMIN — SODIUM CHLORIDE 1000 MILLILITER(S): 9 INJECTION INTRAMUSCULAR; INTRAVENOUS; SUBCUTANEOUS at 17:07

## 2024-04-07 NOTE — ED PROVIDER NOTE - OBJECTIVE STATEMENT
82 yo female with hx RA, COPD (former smoker), GERD, macular degeneration, migraine headaches, recent L MCA CVA requiring TNK with no residual deficits presenting with palpitations.     States that palpitations started around 2:30 and lasted until arrival in ER (before converting to NSR). Currently on ASA and Plavix. No chest pain or associated shortness of breath. Patient follows with cardiologist Dr. Candace Rodriguez and planning for ILR as outpatient. No arrythmia appreciated during recent hospitalization for CVA evaluation. Currently does not take any edi blockade.

## 2024-04-07 NOTE — ED PROVIDER NOTE - CARE PROVIDER_API CALL
Candace Rodriguez  Cardiology  [address] [place] [address]  Phone: [phone]  Fax: [phone]  Follow Up Time:

## 2024-04-07 NOTE — ED PROVIDER NOTE - NSFOLLOWUPINSTRUCTIONS_ED_ALL_ED_FT
1. Toprol 25 mg once daily. Notify Dr. Rodriguez if any dizziness or blood pressure recordings <100   Discontinue medication if effects become worrisome  2. Follow up with Dr. Rodriguez next week  3. Confirmatory thyroid studies sent, you will be notified of any further abnormalities.   4. Seek Medical attention or return to the emergency department for any new or worsening symptoms.

## 2024-04-07 NOTE — ED ADULT NURSE NOTE - MODE OF DISCHARGE
Pre-Visit Chart Review  For Appointment Scheduled on (7/24/19)    Health Maintenance Due   Topic Date Due    TETANUS VACCINE  08/10/1961                     Ambulatory with cane/crutches/walker

## 2024-04-07 NOTE — ED ADULT NURSE NOTE - OBJECTIVE STATEMENT
patient presents to ED with rapid heart rate.  patient placed on monitor.  no other complaints or concerns.  patient appears tachy at 110 on monitor.

## 2024-04-07 NOTE — ED PROVIDER NOTE - NSICDXFAMILYHX_GEN_ALL_CORE_FT
FAMILY HISTORY:  Family history of CVA, mother    Mother  Still living? Unknown  Family history of CVA, Age at diagnosis: Age Unknown

## 2024-04-07 NOTE — ED PROVIDER NOTE - PATIENT PORTAL LINK FT
You can access the FollowMyHealth Patient Portal offered by [facility] by registering at the following website: [email or web address]. By joining [facility]’s FollowMyHealth portal, you will also be able to view your health information using other applications (apps) compatible with our system.

## 2024-04-07 NOTE — ED PROVIDER NOTE - NSICDXPASTMEDICALHX_GEN_ALL_CORE_FT
PAST MEDICAL HISTORY:  Brain TIA     CVA (cerebrovascular accident)     Degeneration macular     Emphysema of lung controlled    Environmental allergies     GERD (gastroesophageal reflux disease)     H/O emphysema     Macular degeneration     Migraine headache     Migraine headache     RA (rheumatoid arthritis)     Rheumatoid arthritis

## 2024-04-07 NOTE — ED PROVIDER NOTE - CLINICAL SUMMARY MEDICAL DECISION MAKING FREE TEXT BOX
82 yo female with hx RA, COPD (former smoker), GERD, macular degeneration, migraine headaches, recent L MCA CVA requiring TNK with no residual deficits presenting with palpitations.   Initial EKG Atach converted to NSR spontaneously   Will check screening lab including TSH & cardiac enzymes.   d/w patient's cardiologist regarding introducing edi blockers +/- AC 82 yo female with hx RA, COPD (former smoker), GERD, macular degeneration, migraine headaches, recent L MCA CVA requiring TNK with no residual deficits presenting with palpitations.   Initial EKG Atach converted to NSR spontaneously   Will check screening lab including TSH & cardiac enzymes.   d/w patient's cardiologist regarding introducing edi blockers

## 2024-04-07 NOTE — ED PROVIDER NOTE - PHYSICAL EXAMINATION
GENERAL: no acute distress; well-developed  HEAD:  Atraumatic, Normocephalic  EYES: EOMI, PERRLA,   ENT: MMM; oropharynx clear  NECK: Supple, No JVD  CHEST/LUNG: Clear to auscultation bilaterally; No wheeze  HEART: Regular rate and rhythm; No murmurs, rubs, or gallops  ABDOMEN: Soft, Nontender, Nondistended; Bowel sounds present  EXTREMITIES:  2+ Peripheral Pulses, No clubbing, cyanosis, or edema  PSYCH: AAOx3  NEUROLOGY: no focal motor or sensory deficits. 5/5 muscle strength in all extremities.   SKIN: No rashes or lesions

## 2024-04-07 NOTE — ED PROVIDER NOTE - NSICDXPASTSURGICALHX_GEN_ALL_CORE_FT
PAST SURGICAL HISTORY:  H/O lumpectomy 2003    H/O lumpectomy     History of cataract surgery     S/P cataract surgery

## 2024-04-09 PROBLEM — I63.9 CEREBRAL INFARCTION, UNSPECIFIED: Chronic | Status: ACTIVE | Noted: 2024-04-07

## 2024-04-10 ENCOUNTER — NON-APPOINTMENT (OUTPATIENT)
Age: 84
End: 2024-04-10

## 2024-04-10 ENCOUNTER — APPOINTMENT (OUTPATIENT)
Dept: CARDIOLOGY | Facility: CLINIC | Age: 84
End: 2024-04-10
Payer: MEDICARE

## 2024-04-10 ENCOUNTER — APPOINTMENT (OUTPATIENT)
Dept: ELECTROPHYSIOLOGY | Facility: CLINIC | Age: 84
End: 2024-04-10
Payer: MEDICARE

## 2024-04-10 VITALS
HEIGHT: 63.5 IN | HEART RATE: 73 BPM | BODY MASS INDEX: 18.02 KG/M2 | DIASTOLIC BLOOD PRESSURE: 80 MMHG | OXYGEN SATURATION: 97 % | WEIGHT: 103 LBS | SYSTOLIC BLOOD PRESSURE: 138 MMHG

## 2024-04-10 VITALS
HEART RATE: 89 BPM | HEIGHT: 63.5 IN | DIASTOLIC BLOOD PRESSURE: 70 MMHG | BODY MASS INDEX: 18.38 KG/M2 | OXYGEN SATURATION: 95 % | WEIGHT: 105 LBS | SYSTOLIC BLOOD PRESSURE: 108 MMHG

## 2024-04-10 DIAGNOSIS — M06.9 RHEUMATOID ARTHRITIS, UNSPECIFIED: ICD-10-CM

## 2024-04-10 DIAGNOSIS — Z86.73 PERSONAL HISTORY OF TRANSIENT ISCHEMIC ATTACK (TIA), AND CEREBRAL INFARCTION W/OUT RESIDUAL DEFICITS: ICD-10-CM

## 2024-04-10 DIAGNOSIS — I47.19 OTHER SUPRAVENTRICULAR TACHYCARDIA: ICD-10-CM

## 2024-04-10 DIAGNOSIS — K21.9 GASTRO-ESOPHAGEAL REFLUX DISEASE W/OUT ESOPHAGITIS: ICD-10-CM

## 2024-04-10 PROCEDURE — 99204 OFFICE O/P NEW MOD 45 MIN: CPT

## 2024-04-10 PROCEDURE — 93000 ELECTROCARDIOGRAM COMPLETE: CPT

## 2024-04-10 PROCEDURE — 99215 OFFICE O/P EST HI 40 MIN: CPT

## 2024-04-10 PROCEDURE — G2211 COMPLEX E/M VISIT ADD ON: CPT

## 2024-04-10 RX ORDER — NORMAL SALT TABLETS 1 G/G
1 TABLET ORAL
Refills: 0 | Status: DISCONTINUED | COMMUNITY
End: 2024-04-10

## 2024-04-10 RX ORDER — FAMOTIDINE 20 MG/1
20 TABLET, FILM COATED ORAL TWICE DAILY
Refills: 0 | Status: ACTIVE | COMMUNITY

## 2024-04-10 RX ORDER — TOCILIZUMAB 20 MG/ML
INJECTION, SOLUTION, CONCENTRATE INTRAVENOUS
Refills: 0 | Status: ACTIVE | COMMUNITY

## 2024-04-10 RX ORDER — ROSUVASTATIN CALCIUM 40 MG/1
40 TABLET, FILM COATED ORAL
Refills: 0 | Status: DISCONTINUED | COMMUNITY
End: 2024-04-10

## 2024-04-10 RX ORDER — MOMETASONE FUROATE 1 MG/G
0.1 OINTMENT TOPICAL
Qty: 1 | Refills: 2 | Status: DISCONTINUED | COMMUNITY
Start: 2022-10-21 | End: 2024-04-10

## 2024-04-10 RX ORDER — MELOXICAM 7.5 MG/1
7.5 TABLET ORAL
Qty: 30 | Refills: 0 | Status: DISCONTINUED | COMMUNITY
Start: 2021-11-29 | End: 2024-04-10

## 2024-04-10 NOTE — CARDIOLOGY SUMMARY
[de-identified] : SR  [de-identified] : 3/2024 TTE showed an EF of 55-60% with no LA, moderate AI, moderate to severe TR.

## 2024-04-10 NOTE — PHYSICAL EXAM
[Normal Rate] : normal [Rhythm Regular] : regular [Normal S1] : normal S1 [Normal S2] : normal S2 [II] : a grade 2 [No Pitting Edema] : no pitting edema present [1+] : left 1+ [Normal] : alert and oriented, normal memory [Right Carotid Bruit] : no bruit heard over the right carotid [Left Carotid Bruit] : no bruit heard over the left carotid

## 2024-04-10 NOTE — HISTORY OF PRESENT ILLNESS
[FreeTextEntry1] : 83 year old female with history of RA on Actemra, COPD (former smoker), GERD, macular degeneration, migraine headache with aura, TIA/ CVA presents for a follwoup visit.   She recent TIA diagnosed at [facility] on 3/17/24 after an episode of expressive aphasia and right facial droop who presented to [facility] on 3/19/24 with difficulty expressing herself and presents today for hospital follow up. 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 She subsequently signed out AMA but presented to [facility] on 3/19/24. She states that she was driving when she began to feel sudden onset fatigue, subsequently developing garbled speech and difficulty expressing herself, lasting about 30 minutes. However, while in the ED, the patients symptoms returned, and she was unable to speak. She had an NIHSS of 4 for right facial weakness, trace dysarthria, and difficulty with word finding. CTH revealed a calcific density in the left MCA insula branch. She was given TNK and returned back to baseline, but then developed difficulty word finding again on exam. MRI head showed scattered small acute/subacute infarcts in the left MCA territory. Bilateral carotid US was unremarkable. TTE showed an EF of 55-60% with no LAE, PFO. She reports compliance with ASA/Plavix with no signs of bleeding.   Since her discharge, she recently went to [facility] for palpitations. She was notes to have  PAT and returned to SR. She went to EP today and had a zio placed. I personally reviewed all of the hospital records available to me at this time, which included but are not limited to the discharge summary, labs and imaging reports.  She   denies any chest pain, PND, orthopnea, lower extremity edema, near syncope, syncope, strokelike symptoms. Medication reconciliation performed. She is compliant with her medications.

## 2024-04-10 NOTE — HISTORY OF PRESENT ILLNESS
[FreeTextEntry1] : 83 year old female with history of RA on Actemra, COPD (former smoker), GERD, macular degeneration, migraine headache with aura, TIA/ CVA presents for a follwoup visit.   She recent TIA diagnosed at [facility] on 3/17/24 after an episode of expressive aphasia and right facial droop who presented to [facility] on 3/19/24 with difficulty expressing herself and presents today for hospital follow up. 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 She subsequently signed out AMA but presented to [facility] on 3/19/24. She states that she was driving when she began to feel sudden onset fatigue, subsequently developing garbled speech and difficulty expressing herself, lasting about 30 minutes. However, while in the ED, the patients symptoms returned, and she was unable to speak. She had an NIHSS of 4 for right facial weakness, trace dysarthria, and difficulty with word finding. CTH revealed a calcific density in the left MCA insula branch. She was given TNK and returned back to baseline, but then developed difficulty word finding again on exam. MRI head showed scattered small acute/subacute infarcts in the left MCA territory. Bilateral carotid US was unremarkable. TTE showed an EF of 55-60% with no LAE, PFO. She reports compliance with ASA/Plavix with no signs of bleeding.   Since her discharge, she has been feleing some emotional labiltiy. She   denies any chest pain, PND, orthopnea, lower extremity edema, near syncope, syncope, strokelike symptoms. Medication reconciliation performed. She is compliant with her medications.

## 2024-04-10 NOTE — PHYSICAL EXAM
[Well Developed] : well developed [Well Nourished] : well nourished [No Acute Distress] : no acute distress [Normal Conjunctiva] : normal conjunctiva [Normal Venous Pressure] : normal venous pressure [No Carotid Bruit] : no carotid bruit [Normal S1, S2] : normal S1, S2 [Good Air Entry] : good air entry [No Respiratory Distress] : no respiratory distress  [Soft] : abdomen soft [Non Tender] : non-tender [Normal Gait] : normal gait [No Edema] : no edema [Venous varicosities] : venous varicosities [No Rash] : no rash [No Skin Lesions] : no skin lesions [Moves all extremities] : moves all extremities [No Focal Deficits] : no focal deficits [Normal Speech] : normal speech [Normal] : alert and oriented, normal memory [Alert and Oriented] : alert and oriented [Normal memory] : normal memory [Murmur] : murmur [Wheeze ____] : wheeze [unfilled]

## 2024-04-10 NOTE — DISCUSSION/SUMMARY
[EKG obtained to assist in diagnosis and management of assessed problem(s)] : EKG obtained to assist in diagnosis and management of assessed problem(s) [FreeTextEntry1] : 83 year woman with a history as listed presents for a followup cardiac evaluation.  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 Neuro recs appreciated. She will continue DAPT for 3 months. We spoke about an ILR she has agreed to get one to r/o PAF She has moderate AI and moderate to severe TR in the hospital. I would repeat an echo to reassess her valvular disease with better BP control. WIll redo bubble study.  She will undergo a nuclear stress test to assess for underlying obstructive CAD.  She will continue Toprol 25mg Qday.  Exercise and diet counseling was performed in order to reduce her future cardiovascular risk. She will followup with me in 2-3 months or sooner if necessary.

## 2024-04-10 NOTE — PHYSICAL EXAM
[Normal Rate] : normal [Rhythm Regular] : regular [Normal S1] : normal S1 [Normal S2] : normal S2 [II] : a grade 2 [No Pitting Edema] : no pitting edema present [1+] : left 1+ [Right Carotid Bruit] : no bruit heard over the right carotid [Left Carotid Bruit] : no bruit heard over the left carotid [Normal] : alert and oriented, normal memory

## 2024-04-10 NOTE — DISCUSSION/SUMMARY
[FreeTextEntry1] : 83 year woman with a history as listed presents for a followup cardiac evaluation.  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 Neuro recs appreciated. She will continue DAPT for 3 months.   She now had a documented PAT episode. Wearing a zio patch.  She has moderate AI and moderate to severe TR in the hospital. I would repeat an echo to reassess her valvular disease with better BP control. Will redo bubble study.  She will undergo a nuclear stress test to assess for underlying obstructive CAD.  She will continue Toprol 25mg Qday.  Exercise and diet counseling was performed in order to reduce her future cardiovascular risk. She will followup with me in 2-3 months or sooner if necessary.  [EKG obtained to assist in diagnosis and management of assessed problem(s)] : EKG obtained to assist in diagnosis and management of assessed problem(s)

## 2024-04-10 NOTE — CARDIOLOGY SUMMARY
[de-identified] : SR  [de-identified] : 3/2024 TTE showed an EF of 55-60% with no LA, moderate AI, moderate to severe TR.

## 2024-04-12 ENCOUNTER — APPOINTMENT (OUTPATIENT)
Dept: NEUROLOGY | Facility: CLINIC | Age: 84
End: 2024-04-12

## 2024-04-12 PROBLEM — I47.19 ATRIAL TACHYCARDIA: Status: ACTIVE | Noted: 2024-04-10

## 2024-04-12 NOTE — REVIEW OF SYSTEMS
[Speech Disturbance] : speech disturbance [Easy Bleeding] : a tendency for easy bleeding [Easy Bruising] : a tendency for easy bruising [Negative] : Psychiatric [Dizziness] : no dizziness [Tremor] : no tremor was seen [Numbness (Hypoesthesia)] : no numbness [Convulsions] : no convulsions [Tingling (Paresthesia)] : no tingling [Weakness] : no weakness [Limb Weakness (Paresis)] : no limb weakness (Paresis) [Swollen Glands] : no swollen glands [FreeTextEntry5] : see HPI [de-identified] : undergoing speech therapy since CVA [de-identified] : DAPT

## 2024-04-12 NOTE — HISTORY OF PRESENT ILLNESS
[FreeTextEntry1] : I had the pleasure of seeing Cierra Lowe today in the arrythmia clinic at [facility]. As you well know, she is a pleasant 83-year-old female with a history of rheumatoid arthritis, migraine headaches with aura, COPD, former smoker, and GERD. On 3/17/2024, patient had new onset expressive aphasia and right sided facial droop. Symptoms were short lived, only lasting ~1.5 minutes, and completely resolved prior to evaluation at [facility]'s ED. 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 She was to be admitted for TIA workup, but she signed out AMA. She then presented to [facility] on 3/19/2024 with complaints of fatigue, garbled speech, and expressive aphasia, that lasted ~30 minutes, and returned when the patient was being evaluated in the ED. Bilateral carotid US was unremarkable. MRI of the head revealed scattered small acute/subacute infarcts in the left MCA territory. She subsequently received TNK with initial return to baseline but eventual redevelopment of difficult word finding on exam. She was discharged home on DAPT and has been undergoing speech therapy since (in response to expressive aphasia) with noted improvement. On 4/7/2024, patient presented to [facility]'s ED with complaints of high HRs and palpitations. EKG performed revealed that the patient was in AT, with HRs in the 110s, which spontaneously converted. Workup revealed mildly depressed TSH, with confirmatory free T3 and T4 sent (awaiting results). She was subsequently initiated on Metoprolol 25mg QD. She has had no recurrent episodes of palpitations since initiation of BB. She presents today for consultation for an ILR. Please note that patient has not worn an external monitor following above events. Denies chest pain, shortness of breath, lightheadedness, dizziness, syncope and near-syncope.

## 2024-04-12 NOTE — END OF VISIT
[FreeTextEntry3] : I, Dr. Brett Alonso, personally performed the evaluation and management (E/M) services for this new patient.  That E/M includes conducting the initial examination, assessing all conditions, and establishing the plan of care.  Today, my NP Martina Jackson, was here to observe my evaluation and management services for this patient to be followed going forward. 
Patient/Caregiver provided printed discharge information.

## 2024-04-12 NOTE — CARDIOLOGY SUMMARY
[de-identified] : 4/10/2024: Sinus rhythm @ 76bpm  [de-identified] : 3/19/2024: LVEF 55-60%. LA normal. RA mildly dilated. Mild MR. Moderate AR. Moderate-severe TR.

## 2024-04-12 NOTE — DISCUSSION/SUMMARY
[FreeTextEntry1] : In summary, this is an 83-year-old female with recent TIA (no residual) followed by scattered small acute/subacute infarcts in the left MCA territory s/p TNK c/b expressive aphasia with noted improvements following speech therapy. She was evaluated at [facility] this past Sunday due to complaints of elevated heart rates and palpitations. EKG showed AT 110s, which spontaneously converted. TSH was low, awaiting results of free T3 and T4. She has had no recurrent episodes of palpitations since. 2-week Zio AT ordered to assess for atrial arrythmias. If negative, patient would benefit from implantation of ILR for occult AF monitoring given implications of starting OAC for stroke prophylaxis (CHADSVASc 5). Follow up dependent upon results of Zio. Continue Metoprolol and DAPT in the interim.    Patient and her daughter appeared to understand the whole discussion and verbalized that all of their questions were answered to their satisfaction.   Thank you for allowing me to be involved in the care of this pleasant woman. Please feel free to contact me with any questions.   Brett Alonso MD   of Cardiology  Electrophysiology Section  [address] [place] [id number]  Office: [phone]  Fax: [phone]  [EKG obtained to assist in diagnosis and management of assessed problem(s)] : EKG obtained to assist in diagnosis and management of assessed problem(s)

## 2024-04-15 RX ORDER — METOPROLOL SUCCINATE 25 MG/1
25 TABLET, EXTENDED RELEASE ORAL DAILY
Qty: 90 | Refills: 1 | Status: ACTIVE | COMMUNITY

## 2024-04-23 RX ORDER — ATORVASTATIN CALCIUM 80 MG/1
80 TABLET, FILM COATED ORAL
Refills: 0 | Status: DISCONTINUED | COMMUNITY
End: 2024-04-23

## 2024-05-06 ENCOUNTER — APPOINTMENT (OUTPATIENT)
Dept: NEUROLOGY | Facility: CLINIC | Age: 84
End: 2024-05-06
Payer: MEDICARE

## 2024-05-06 VITALS
DIASTOLIC BLOOD PRESSURE: 76 MMHG | BODY MASS INDEX: 18.5 KG/M2 | HEART RATE: 80 BPM | SYSTOLIC BLOOD PRESSURE: 116 MMHG | WEIGHT: 104.38 LBS | OXYGEN SATURATION: 95 % | HEIGHT: 63 IN

## 2024-05-06 PROCEDURE — G2211 COMPLEX E/M VISIT ADD ON: CPT

## 2024-05-06 PROCEDURE — 99215 OFFICE O/P EST HI 40 MIN: CPT

## 2024-05-06 RX ORDER — APIXABAN 2.5 MG/1
2.5 TABLET, FILM COATED ORAL
Qty: 180 | Refills: 1 | Status: ACTIVE | COMMUNITY
Start: 2024-05-06 | End: 1900-01-01

## 2024-05-06 NOTE — HISTORY OF PRESENT ILLNESS
[FreeTextEntry1] : Ms. Lowe is an 82 yo female with history of RA, COPD (former smoker), GERD, macular degeneration, migraine headache with aura, TIA diagnosed at [facility] on 3/17/24 after an episode of expressive aphasia and right facial droop who presented to [facility] on 3/19/24 with difficulty expressing herself, found to have small acute/subacute infarcts in the left MCA territory. She presents today for follow up. he has been doing well. She has noticed some improvement in her fatigue. She presented to [facility] on 4/7/24, found to have paroxysmal atrial tachycardia, now on Metoprolol 25 mg daily. She has completed 2-week cardiac monitor, awaiting results. Has scheduled appt. for ILR insertion. She has been on ASA 81 mg daily and statin therapy, tolerating both well. No new stroke like/TIA symptoms. She is anxious about having another stroke and the time it is taking for her to have an ILR placed.

## 2024-05-06 NOTE — PHYSICAL EXAM
[FreeTextEntry1] : GENERAL PHYSICAL EXAM: GEN: no distress, normal affect  NEUROLOGICAL EXAM: Mental Status Orientation: alert and oriented to person, place, time, and situation Language: clear and fluent, intact comprehension and repetition. No dysarthria.   Cranial Nerves II: visual fields full to confrontation III, IV, VI: PERRL, EOMI V, VII: facial sensation and movement intact and symmetric VIII: hearing intact IX, X: uvula midline, soft palate elevates normally XI: BL shoulder shrug intact XII: tongue midline   Motor Shoulder abduction: 5 (R), 5 (L) UE flexion: 5 (R), 5 (L) UE extension: 5 (R), 5 (L) Hand : 5 (R), 5 (L) Hip flexion: 5 (R), 5 (L) Knee flexion: 5 (R), 5 (L) Knee extension: 5 (R), 5 (L) Dorsiflexion: 5 (R), 5 (L) Plantar flexion: 5 (R), 5 (L)   Tone and bulk are normal in upper and lower limbs No pronator drift   Sensation Intact to light touch in all 4 EXTs  Coordination Normal FTN bilaterally Able to perform rapid, alternating movements   Gait Normal ambulation with no imbalance Negative Romberg

## 2024-05-06 NOTE — DISCUSSION/SUMMARY
[FreeTextEntry1] : Ms. Lowe is an 82 yo female with history of RA, COPD (former smoker), GERD, macular degeneration, migraine headache with aura now almost 2 months s/p TIA, found to have scattered small acute/subacute infarcts in the left MCA territory s/p TNK. Etiology ESUS. Given recent findings of paroxysmal atrial tachycardia, she is at increased risk of having AFib. It is taking too long in my opinion for her to be evaluated properly to exclude AF (due to insurance requirement for two week monitor and then ILR), and given her age and ESUS stroke, her risk of undiagnosed PAF is high. I feel that it is reasonable for her to start Eliquis 2.5 mg BID (age > 80, weight <60kg) and stop ASA as we are awaiting results of 2-week cardiac monitor. If 2-week cardiac monitoring is negative for AFib, I continue to recommend that she get ILR placed for long term cardiac monitoring for AFib to confirm if long term anticoagulation is needed. If after 2 years on AC, no AF is found, then I would change her back to ASA. The patient and her daughter are happy with and appreciative of this plan. Aggressive risk factor modification should be continued for secondary stroke prevention, consisting intensive blood pressure control and lipid lowering therapy. She will follow up with me in 3 months. All of the patient's questions and concerns were addressed.

## 2024-05-08 ENCOUNTER — NON-APPOINTMENT (OUTPATIENT)
Age: 84
End: 2024-05-08

## 2024-05-10 ENCOUNTER — APPOINTMENT (OUTPATIENT)
Dept: CARDIOLOGY | Facility: CLINIC | Age: 84
End: 2024-05-10
Payer: MEDICARE

## 2024-05-10 PROCEDURE — A9500: CPT

## 2024-05-10 PROCEDURE — 93015 CV STRESS TEST SUPVJ I&R: CPT

## 2024-05-10 PROCEDURE — 78452 HT MUSCLE IMAGE SPECT MULT: CPT

## 2024-05-22 ENCOUNTER — APPOINTMENT (OUTPATIENT)
Dept: ELECTROPHYSIOLOGY | Facility: CLINIC | Age: 84
End: 2024-05-22
Payer: MEDICARE

## 2024-05-22 VITALS — SYSTOLIC BLOOD PRESSURE: 124 MMHG | OXYGEN SATURATION: 99 % | DIASTOLIC BLOOD PRESSURE: 75 MMHG | HEART RATE: 73 BPM

## 2024-05-22 DIAGNOSIS — I63.9 CEREBRAL INFARCTION, UNSPECIFIED: ICD-10-CM

## 2024-05-22 PROCEDURE — 93000 ELECTROCARDIOGRAM COMPLETE: CPT

## 2024-05-22 PROCEDURE — 99215 OFFICE O/P EST HI 40 MIN: CPT

## 2024-05-22 RX ORDER — CLOPIDOGREL 75 MG/1
75 TABLET, FILM COATED ORAL
Qty: 90 | Refills: 1 | Status: DISCONTINUED | COMMUNITY
End: 2024-05-22

## 2024-05-22 RX ORDER — ASPIRIN 81 MG/1
81 TABLET ORAL
Refills: 0 | Status: DISCONTINUED | COMMUNITY
End: 2024-05-22

## 2024-05-22 RX ORDER — ROSUVASTATIN CALCIUM 40 MG/1
40 TABLET, FILM COATED ORAL DAILY
Refills: 0 | Status: ACTIVE | COMMUNITY
Start: 2024-04-23

## 2024-05-22 NOTE — PHYSICAL EXAM
[Well Developed] : well developed [Well Nourished] : well nourished [No Acute Distress] : no acute distress [Normal Conjunctiva] : normal conjunctiva [Normal Venous Pressure] : normal venous pressure [No Carotid Bruit] : no carotid bruit [Normal S1, S2] : normal S1, S2 [Murmur] : murmur [Good Air Entry] : good air entry [No Respiratory Distress] : no respiratory distress  [Wheeze ____] : wheeze [unfilled] [Soft] : abdomen soft [Non Tender] : non-tender [Normal Gait] : normal gait [No Edema] : no edema [Venous varicosities] : venous varicosities [No Rash] : no rash [No Skin Lesions] : no skin lesions [Moves all extremities] : moves all extremities [No Focal Deficits] : no focal deficits [Normal Speech] : normal speech [Normal] : alert and oriented, normal memory [Alert and Oriented] : alert and oriented [Normal memory] : normal memory

## 2024-05-28 PROBLEM — I63.9 CEREBRAL VASCULAR ACCIDENT: Status: ACTIVE | Noted: 2024-03-25

## 2024-05-28 NOTE — DISCUSSION/SUMMARY
[FreeTextEntry1] : In summary, this is an 83-year-old female with recent TIA (no residual) followed by scattered small acute/subacute infarcts in the left MCA territory s/p TNK c/b expressive aphasia with noted improvements following speech therapy. She presented for ILR evaluation on 4/10/2024. At that time, a 2 week Zio AT was ordered which did not reveal any atrial arrythmias. She was recently placed on Eliquis 2.5mg BID empirically by her neurologist due to her age and ESUS score.   Detailed discussion held with patient and her daughter regarding management strategies. Option 1 is to stop Eliquis 24 hours prior to ILR implantation with plan to resume Eliquis if AF noted on remote monitoring. Option 2 is to continue Eliquis 2.5mg BID WITHOUT implantation of ILR. It is our recommendation for the patient to pursue option 1 as she may not need OAC therapy and her risk of bleeding is high. After all of the patient's/daughter's questions were answered, they would like time to further evaluate options presented to them. They will reach out to our office when they decide how they would like to proceed. In the interim, patient will continue present regimen, including Metoprolol and Eliquis.   Patient and her daughter appeared to understand the whole discussion and verbalized that all of their questions were answered to their satisfaction.   Thank you for allowing me to be involved in the care of this pleasant woman. Please feel free to contact me with any questions.   Brett Alonso MD   of Cardiology  Electrophysiology Section  [address] [place] [address]  Office: [phone]  Fax: [phone]  [EKG obtained to assist in diagnosis and management of assessed problem(s)] : EKG obtained to assist in diagnosis and management of assessed problem(s)

## 2024-05-28 NOTE — HISTORY OF PRESENT ILLNESS
[FreeTextEntry1] : I had the pleasure of seeing Cierra Lowe today in the arrythmia clinic at [facility]. As you well know, she is a pleasant 83-year-old female with a history of rheumatoid arthritis, migraine headaches with aura, COPD, former smoker, and GERD. On 3/17/2024, patient had new onset expressive aphasia and right sided facial droop. Symptoms were short lived, only lasting ~1.5 minutes, and completely resolved prior to evaluation at [facility]'s ED. 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 She was to be admitted for TIA workup, but she signed out AMA. She then presented to [facility] on 3/19/2024 with complaints of fatigue, garbled speech, and expressive aphasia, that lasted ~30 minutes, and returned when the patient was being evaluated in the ED. Bilateral carotid US was unremarkable. MRI of the head revealed scattered small acute/subacute infarcts in the left MCA territory. She subsequently received TNK with initial return to baseline but eventual redevelopment of difficult word finding on exam. She was discharged home on DAPT and has been undergoing speech therapy since (in response to expressive aphasia) with noted improvement. On 4/7/2024, patient presented to [facility]'s ED with complaints of high HRs and palpitations. EKG performed revealed that the patient was in AT, with HRs in the 110s, which spontaneously converted. Workup revealed mildly depressed TSH, with confirmatory free T3 and T4 sent WNL. She was subsequently initiated on Metoprolol 25mg QD. She presented to our office on 4/10/2024 for ILR evaluation. At that time, a 2-week Zio AT was ordered to assess for atrial arrythmias to determine further management [OAC initiation (if AF noted) vs. ILR implantation]. Subsequent 2 week Zio (4/10-4/24/2024) with no noted episodes of AF. On 5/6/2024, she had a follow up appointment with her neurologist, Dr. Devante Abraham, at which time she was started on Eliquis 2.5mg BID given her age and ESUS score. She presents today to discuss ILR implantation. Denies chest pain, palpitations, shortness of breath, lightheadedness, dizziness, syncope and near-syncope.

## 2024-05-28 NOTE — CARDIOLOGY SUMMARY
[de-identified] : 5/22/2024: Sinus rhythm @ 75bpm; occasional APC  4/10/2024: Sinus rhythm @ 76bpm  [de-identified] : Nuclear Stress Test(5/13/2024): Normal myocardial perfusion scan, with no evidence of infarction or inducible ischemia. LVEF 69%.   [de-identified] : 3/19/2024: LVEF 55-60%. LA normal. RA mildly dilated. Mild MR. Moderate AR. Moderate-severe TR.

## 2024-05-28 NOTE — END OF VISIT
[FreeTextEntry3] : I, Dr. Brett Alonso, personally performed the evaluation and management (E/M) services for this new patient.  That E/M includes conducting the initial examination, assessing all conditions, and establishing the plan of care.  Today, my NP Martina Jackson, was here to observe my evaluation and management services for this patient to be followed going forward.  [Time Spent: ___ minutes] : I have spent [unfilled] minutes of time on the encounter.

## 2024-05-28 NOTE — REVIEW OF SYSTEMS
[Speech Disturbance] : speech disturbance [Easy Bleeding] : a tendency for easy bleeding [Easy Bruising] : a tendency for easy bruising [Negative] : Psychiatric [Dizziness] : no dizziness [Tremor] : no tremor was seen [Numbness (Hypoesthesia)] : no numbness [Convulsions] : no convulsions [Tingling (Paresthesia)] : no tingling [Weakness] : no weakness [Limb Weakness (Paresis)] : no limb weakness (Paresis) [Swollen Glands] : no swollen glands [FreeTextEntry5] : see HPI [de-identified] : undergoing speech therapy since CVA [de-identified] : Eliquis

## 2024-06-04 ENCOUNTER — NON-APPOINTMENT (OUTPATIENT)
Age: 84
End: 2024-06-04

## 2024-06-04 ENCOUNTER — APPOINTMENT (OUTPATIENT)
Dept: CARDIOLOGY | Facility: CLINIC | Age: 84
End: 2024-06-04
Payer: MEDICARE

## 2024-06-04 PROCEDURE — 93306 TTE W/DOPPLER COMPLETE: CPT

## 2024-06-12 ENCOUNTER — TRANSCRIPTION ENCOUNTER (OUTPATIENT)
Age: 84
End: 2024-06-12

## 2024-06-12 ENCOUNTER — OUTPATIENT (OUTPATIENT)
Dept: OUTPATIENT SERVICES | Facility: HOSPITAL | Age: 84
LOS: 1 days | End: 2024-06-12
Payer: MEDICARE

## 2024-06-12 VITALS
SYSTOLIC BLOOD PRESSURE: 121 MMHG | RESPIRATION RATE: 16 BRPM | DIASTOLIC BLOOD PRESSURE: 63 MMHG | HEART RATE: 70 BPM | OXYGEN SATURATION: 99 % | TEMPERATURE: 98 F

## 2024-06-12 VITALS
HEART RATE: 77 BPM | SYSTOLIC BLOOD PRESSURE: 111 MMHG | RESPIRATION RATE: 15 BRPM | DIASTOLIC BLOOD PRESSURE: 54 MMHG | OXYGEN SATURATION: 99 %

## 2024-06-12 DIAGNOSIS — I44.2 ATRIOVENTRICULAR BLOCK, COMPLETE: ICD-10-CM

## 2024-06-12 DIAGNOSIS — Z98.49 CATARACT EXTRACTION STATUS, UNSPECIFIED EYE: Chronic | ICD-10-CM

## 2024-06-12 DIAGNOSIS — Z98.890 OTHER SPECIFIED POSTPROCEDURAL STATES: Chronic | ICD-10-CM

## 2024-06-12 PROCEDURE — 33285 INSJ SUBQ CAR RHYTHM MNTR: CPT

## 2024-06-12 PROCEDURE — C1764: CPT

## 2024-06-12 RX ORDER — ESTROGENS, CONJUGATED 0.625 MG
0 TABLET ORAL
Qty: 0 | Refills: 0 | DISCHARGE

## 2024-06-12 RX ORDER — AMMONIUM CHLORIDE
0 POWDER (GRAM) MISCELLANEOUS
Qty: 0 | Refills: 0 | DISCHARGE

## 2024-06-12 RX ORDER — FLUNISOLIDE 80 UG/1
0 AEROSOL, METERED RESPIRATORY (INHALATION)
Qty: 0 | Refills: 0 | DISCHARGE

## 2024-06-12 RX ORDER — FAMOTIDINE 10 MG/ML
1 INJECTION INTRAVENOUS
Refills: 0 | DISCHARGE

## 2024-06-12 RX ORDER — SODIUM CHLORIDE 9 MG/ML
1 INJECTION INTRAMUSCULAR; INTRAVENOUS; SUBCUTANEOUS
Refills: 0 | DISCHARGE

## 2024-06-12 RX ORDER — FOLIC ACID 0.8 MG
5 TABLET ORAL
Qty: 0 | Refills: 0 | DISCHARGE

## 2024-06-12 RX ORDER — TOCILIZUMAB 20 MG/ML
192 INJECTION, SOLUTION, CONCENTRATE INTRAVENOUS
Refills: 0 | DISCHARGE

## 2024-06-12 RX ORDER — ROSUVASTATIN CALCIUM 5 MG/1
1 TABLET ORAL
Refills: 0 | DISCHARGE

## 2024-06-12 RX ORDER — FLUNISOLIDE 25 MCG
2 AEROSOL, SPRAY (ML) NASAL
Qty: 0 | Refills: 0 | DISCHARGE

## 2024-06-12 RX ORDER — APIXABAN 2.5 MG/1
1 TABLET, FILM COATED ORAL
Refills: 0 | DISCHARGE

## 2024-06-12 RX ORDER — IPRATROPIUM BROMIDE 21 MCG
2 AEROSOL, SPRAY (ML) NASAL
Qty: 0 | Refills: 0 | DISCHARGE

## 2024-06-12 RX ORDER — FLUNISOLIDE 25 MCG
2 AEROSOL, SPRAY (ML) NASAL
Refills: 0 | DISCHARGE

## 2024-06-12 RX ORDER — TOCILIZUMAB 20 MG/ML
0 INJECTION, SOLUTION, CONCENTRATE INTRAVENOUS
Qty: 0 | Refills: 0 | DISCHARGE

## 2024-06-12 NOTE — ASU DISCHARGE PLAN (ADULT/PEDIATRIC) - PROVIDER TOKENS
FREE:[LAST:[Wound check with EP ACP],PHONE:[(   )    -],FAX:[(   )    -],ADDRESS:[[address] [place] [address]],SCHEDULEDAPPT:[06/24/2024],SCHEDULEDAPPTTIME:[01:40 PM]]

## 2024-06-12 NOTE — ASU DISCHARGE PLAN (ADULT/PEDIATRIC) - CARE PROVIDER_API CALL
Orders Placed This Encounter   • DULoxetine (CYMBALTA) 60 MG capsule     Sig: Take 1 capsule by mouth daily.     Dispense:  90 capsule     Refill:  0   • gabapentin (NEURONTIN) 300 MG capsule     Sig: Take one capsule in the morning on the first day then take 1 capsule in the am and pm tomorrow then take 1 capsule three times daily.     Dispense:  90 capsule     Refill:  5         Rx sent to the pharmacy     [facility] - [place], [place] - [address]   [facility] [facility] [address]                               The MRI of his back actually looks pretty good with only mild to moderate stenosis (narrowing) of the spine.       Physical therapy and or chiropractic care is recommended.      Cymbalta can be increased to 60 mg daily    Adding gabapentin would be the next step  Please let him know.        Wound check with ERIKA ARBOLEDA,   [address] [place] [address]  Phone: (   )    -  Fax: (   )    -  Scheduled Appointment: 06/24/2024 01:40 PM

## 2024-06-12 NOTE — ASU PATIENT PROFILE, ADULT - FALL HARM RISK - UNIVERSAL INTERVENTIONS
Bed in lowest position, wheels locked, appropriate side rails in place/Call bell, personal items and telephone in reach/Instruct patient to call for assistance before getting out of bed or chair/Non-slip footwear when patient is out of bed/[place] to call system/Physically safe environment - no spills, clutter or unnecessary equipment/Purposeful Proactive Rounding/Room/bathroom lighting operational, light cord in reach
No

## 2024-06-12 NOTE — ASU DISCHARGE PLAN (ADULT/PEDIATRIC) - NS MD DC FALL RISK RISK
For information on Fall & Injury Prevention, visit: [email or web address] OR  [email or web address] OR  https://www.cdc.gov/steadi/patient.html

## 2024-06-13 ENCOUNTER — NON-APPOINTMENT (OUTPATIENT)
Age: 84
End: 2024-06-13

## 2024-06-26 ENCOUNTER — NON-APPOINTMENT (OUTPATIENT)
Age: 84
End: 2024-06-26

## 2024-06-26 ENCOUNTER — APPOINTMENT (OUTPATIENT)
Dept: ELECTROPHYSIOLOGY | Facility: CLINIC | Age: 84
End: 2024-06-26

## 2024-06-26 VITALS — OXYGEN SATURATION: 98 % | SYSTOLIC BLOOD PRESSURE: 102 MMHG | DIASTOLIC BLOOD PRESSURE: 67 MMHG | HEART RATE: 79 BPM

## 2024-06-26 PROCEDURE — 93000 ELECTROCARDIOGRAM COMPLETE: CPT | Mod: 59

## 2024-06-26 PROCEDURE — 93291 INTERROG DEV EVAL SCRMS IP: CPT

## 2024-07-03 ENCOUNTER — INPATIENT (INPATIENT)
Facility: HOSPITAL | Age: 84
LOS: 1 days | Discharge: HOME | DRG: 310 | End: 2024-07-05
Attending: INTERNAL MEDICINE | Admitting: INTERNAL MEDICINE
Payer: MEDICARE

## 2024-07-03 ENCOUNTER — TRANSCRIPTION ENCOUNTER (OUTPATIENT)
Age: 84
End: 2024-07-03

## 2024-07-03 VITALS
DIASTOLIC BLOOD PRESSURE: 53 MMHG | RESPIRATION RATE: 18 BRPM | HEART RATE: 144 BPM | TEMPERATURE: 95 F | HEIGHT: 62 IN | WEIGHT: 106.04 LBS | SYSTOLIC BLOOD PRESSURE: 79 MMHG | OXYGEN SATURATION: 95 %

## 2024-07-03 DIAGNOSIS — E78.5 HYPERLIPIDEMIA, UNSPECIFIED: ICD-10-CM

## 2024-07-03 DIAGNOSIS — K21.9 GASTRO-ESOPHAGEAL REFLUX DISEASE WITHOUT ESOPHAGITIS: ICD-10-CM

## 2024-07-03 DIAGNOSIS — Z86.73 PERSONAL HISTORY OF TRANSIENT ISCHEMIC ATTACK (TIA), AND CEREBRAL INFARCTION WITHOUT RESIDUAL DEFICITS: ICD-10-CM

## 2024-07-03 DIAGNOSIS — Z98.49 CATARACT EXTRACTION STATUS, UNSPECIFIED EYE: Chronic | ICD-10-CM

## 2024-07-03 DIAGNOSIS — Z98.890 OTHER SPECIFIED POSTPROCEDURAL STATES: Chronic | ICD-10-CM

## 2024-07-03 DIAGNOSIS — I48.91 UNSPECIFIED ATRIAL FIBRILLATION: ICD-10-CM

## 2024-07-03 DIAGNOSIS — Z29.9 ENCOUNTER FOR PROPHYLACTIC MEASURES, UNSPECIFIED: ICD-10-CM

## 2024-07-03 DIAGNOSIS — R79.89 OTHER SPECIFIED ABNORMAL FINDINGS OF BLOOD CHEMISTRY: ICD-10-CM

## 2024-07-03 DIAGNOSIS — J43.9 EMPHYSEMA, UNSPECIFIED: ICD-10-CM

## 2024-07-03 DIAGNOSIS — M06.9 RHEUMATOID ARTHRITIS, UNSPECIFIED: ICD-10-CM

## 2024-07-03 DIAGNOSIS — I95.9 HYPOTENSION, UNSPECIFIED: ICD-10-CM

## 2024-07-03 LAB
A1C WITH ESTIMATED AVERAGE GLUCOSE RESULT: 5.8 % — HIGH (ref 4–5.6)
ALBUMIN SERPL ELPH-MCNC: 2.9 G/DL — LOW (ref 3.3–5)
ALBUMIN SERPL ELPH-MCNC: 3.3 G/DL — SIGNIFICANT CHANGE UP (ref 3.3–5)
ALP SERPL-CCNC: 71 U/L — SIGNIFICANT CHANGE UP (ref 40–120)
ALP SERPL-CCNC: 76 U/L — SIGNIFICANT CHANGE UP (ref 40–120)
ALT FLD-CCNC: 125 U/L — HIGH (ref 12–78)
ALT FLD-CCNC: 36 U/L — SIGNIFICANT CHANGE UP (ref 12–78)
ANION GAP SERPL CALC-SCNC: 5 MMOL/L — SIGNIFICANT CHANGE UP (ref 5–17)
ANION GAP SERPL CALC-SCNC: 7 MMOL/L — SIGNIFICANT CHANGE UP (ref 5–17)
APPEARANCE UR: CLEAR — SIGNIFICANT CHANGE UP
APTT BLD: 27.1 SEC — SIGNIFICANT CHANGE UP (ref 24.5–35.6)
AST SERPL-CCNC: 147 U/L — HIGH (ref 15–37)
AST SERPL-CCNC: 52 U/L — HIGH (ref 15–37)
BACTERIA # UR AUTO: ABNORMAL /HPF
BASOPHILS # BLD AUTO: 0.08 K/UL — SIGNIFICANT CHANGE UP (ref 0–0.2)
BASOPHILS NFR BLD AUTO: 1.2 % — SIGNIFICANT CHANGE UP (ref 0–2)
BILIRUB SERPL-MCNC: 0.3 MG/DL — SIGNIFICANT CHANGE UP (ref 0.2–1.2)
BILIRUB SERPL-MCNC: 0.3 MG/DL — SIGNIFICANT CHANGE UP (ref 0.2–1.2)
BILIRUB UR-MCNC: NEGATIVE — SIGNIFICANT CHANGE UP
BUN SERPL-MCNC: 14 MG/DL — SIGNIFICANT CHANGE UP (ref 7–23)
BUN SERPL-MCNC: 17 MG/DL — SIGNIFICANT CHANGE UP (ref 7–23)
CALCIUM SERPL-MCNC: 8.1 MG/DL — LOW (ref 8.5–10.1)
CALCIUM SERPL-MCNC: 9.2 MG/DL — SIGNIFICANT CHANGE UP (ref 8.5–10.1)
CHLORIDE SERPL-SCNC: 104 MMOL/L — SIGNIFICANT CHANGE UP (ref 96–108)
CHLORIDE SERPL-SCNC: 111 MMOL/L — HIGH (ref 96–108)
CHOLEST SERPL-MCNC: 112 MG/DL — SIGNIFICANT CHANGE UP
CK MB BLD-MCNC: 3.1 % — SIGNIFICANT CHANGE UP (ref 0–3.5)
CK MB BLD-MCNC: 4 % — HIGH (ref 0–3.5)
CK MB CFR SERPL CALC: 7.3 NG/ML — HIGH (ref 0–3.6)
CK MB CFR SERPL CALC: 7.4 NG/ML — HIGH (ref 0–3.6)
CK SERPL-CCNC: 187 U/L — SIGNIFICANT CHANGE UP (ref 26–192)
CK SERPL-CCNC: 238 U/L — HIGH (ref 26–192)
CO2 SERPL-SCNC: 24 MMOL/L — SIGNIFICANT CHANGE UP (ref 22–31)
CO2 SERPL-SCNC: 25 MMOL/L — SIGNIFICANT CHANGE UP (ref 22–31)
COLOR SPEC: YELLOW — SIGNIFICANT CHANGE UP
CREAT SERPL-MCNC: 0.5 MG/DL — SIGNIFICANT CHANGE UP (ref 0.5–1.3)
CREAT SERPL-MCNC: 0.7 MG/DL — SIGNIFICANT CHANGE UP (ref 0.5–1.3)
DIFF PNL FLD: ABNORMAL
EGFR: 87 ML/MIN/1.73M2 — SIGNIFICANT CHANGE UP
EGFR: 94 ML/MIN/1.73M2 — SIGNIFICANT CHANGE UP
EOSINOPHIL # BLD AUTO: 0.14 K/UL — SIGNIFICANT CHANGE UP (ref 0–0.5)
EOSINOPHIL NFR BLD AUTO: 2.1 % — SIGNIFICANT CHANGE UP (ref 0–6)
EPI CELLS # UR: PRESENT
ESTIMATED AVERAGE GLUCOSE: 120 MG/DL — HIGH (ref 68–114)
GLUCOSE SERPL-MCNC: 135 MG/DL — HIGH (ref 70–99)
GLUCOSE SERPL-MCNC: 93 MG/DL — SIGNIFICANT CHANGE UP (ref 70–99)
GLUCOSE UR QL: NEGATIVE MG/DL — SIGNIFICANT CHANGE UP
HCT VFR BLD CALC: 36.2 % — SIGNIFICANT CHANGE UP (ref 34.5–45)
HCT VFR BLD CALC: 40.3 % — SIGNIFICANT CHANGE UP (ref 34.5–45)
HDLC SERPL-MCNC: 65 MG/DL — SIGNIFICANT CHANGE UP
HGB BLD-MCNC: 11.9 G/DL — SIGNIFICANT CHANGE UP (ref 11.5–15.5)
HGB BLD-MCNC: 13.1 G/DL — SIGNIFICANT CHANGE UP (ref 11.5–15.5)
IMM GRANULOCYTES NFR BLD AUTO: 0.2 % — SIGNIFICANT CHANGE UP (ref 0–0.9)
INR BLD: 0.96 RATIO — SIGNIFICANT CHANGE UP (ref 0.85–1.18)
KETONES UR-MCNC: NEGATIVE MG/DL — SIGNIFICANT CHANGE UP
LACTATE SERPL-SCNC: 1.6 MMOL/L — SIGNIFICANT CHANGE UP (ref 0.7–2)
LEUKOCYTE ESTERASE UR-ACNC: ABNORMAL
LIPID PNL WITH DIRECT LDL SERPL: 36 MG/DL — SIGNIFICANT CHANGE UP
LYMPHOCYTES # BLD AUTO: 2.74 K/UL — SIGNIFICANT CHANGE UP (ref 1–3.3)
LYMPHOCYTES # BLD AUTO: 41.3 % — SIGNIFICANT CHANGE UP (ref 13–44)
MAGNESIUM SERPL-MCNC: 1.8 MG/DL — SIGNIFICANT CHANGE UP (ref 1.6–2.6)
MCHC RBC-ENTMCNC: 31.6 PG — SIGNIFICANT CHANGE UP (ref 27–34)
MCHC RBC-ENTMCNC: 32.1 PG — SIGNIFICANT CHANGE UP (ref 27–34)
MCHC RBC-ENTMCNC: 32.5 GM/DL — SIGNIFICANT CHANGE UP (ref 32–36)
MCHC RBC-ENTMCNC: 32.9 GM/DL — SIGNIFICANT CHANGE UP (ref 32–36)
MCV RBC AUTO: 97.1 FL — SIGNIFICANT CHANGE UP (ref 80–100)
MCV RBC AUTO: 97.6 FL — SIGNIFICANT CHANGE UP (ref 80–100)
MONOCYTES # BLD AUTO: 0.81 K/UL — SIGNIFICANT CHANGE UP (ref 0–0.9)
MONOCYTES NFR BLD AUTO: 12.2 % — SIGNIFICANT CHANGE UP (ref 2–14)
NEUTROPHILS # BLD AUTO: 2.86 K/UL — SIGNIFICANT CHANGE UP (ref 1.8–7.4)
NEUTROPHILS NFR BLD AUTO: 43 % — SIGNIFICANT CHANGE UP (ref 43–77)
NITRITE UR-MCNC: NEGATIVE — SIGNIFICANT CHANGE UP
NON HDL CHOLESTEROL: 47 MG/DL — SIGNIFICANT CHANGE UP
NRBC # BLD: 0 /100 WBCS — SIGNIFICANT CHANGE UP (ref 0–0)
NRBC # BLD: 0 /100 WBCS — SIGNIFICANT CHANGE UP (ref 0–0)
NT-PROBNP SERPL-SCNC: 777 PG/ML — HIGH (ref 0–450)
PH UR: 5.5 — SIGNIFICANT CHANGE UP (ref 5–8)
PHOSPHATE SERPL-MCNC: 3.9 MG/DL — SIGNIFICANT CHANGE UP (ref 2.5–4.5)
PLATELET # BLD AUTO: 244 K/UL — SIGNIFICANT CHANGE UP (ref 150–400)
PLATELET # BLD AUTO: 309 K/UL — SIGNIFICANT CHANGE UP (ref 150–400)
POTASSIUM SERPL-MCNC: 4 MMOL/L — SIGNIFICANT CHANGE UP (ref 3.5–5.3)
POTASSIUM SERPL-MCNC: 4 MMOL/L — SIGNIFICANT CHANGE UP (ref 3.5–5.3)
POTASSIUM SERPL-SCNC: 4 MMOL/L — SIGNIFICANT CHANGE UP (ref 3.5–5.3)
POTASSIUM SERPL-SCNC: 4 MMOL/L — SIGNIFICANT CHANGE UP (ref 3.5–5.3)
PROT SERPL-MCNC: 6 G/DL — SIGNIFICANT CHANGE UP (ref 6–8.3)
PROT SERPL-MCNC: 7 G/DL — SIGNIFICANT CHANGE UP (ref 6–8.3)
PROT UR-MCNC: NEGATIVE MG/DL — SIGNIFICANT CHANGE UP
PROTHROM AB SERPL-ACNC: 11.3 SEC — SIGNIFICANT CHANGE UP (ref 9.5–13)
RAPID RVP RESULT: SIGNIFICANT CHANGE UP
RBC # BLD: 3.71 M/UL — LOW (ref 3.8–5.2)
RBC # BLD: 4.15 M/UL — SIGNIFICANT CHANGE UP (ref 3.8–5.2)
RBC # FLD: 13.2 % — SIGNIFICANT CHANGE UP (ref 10.3–14.5)
RBC # FLD: 13.2 % — SIGNIFICANT CHANGE UP (ref 10.3–14.5)
RBC CASTS # UR COMP ASSIST: SIGNIFICANT CHANGE UP /HPF (ref 0–4)
SARS-COV-2 RNA SPEC QL NAA+PROBE: SIGNIFICANT CHANGE UP
SODIUM SERPL-SCNC: 135 MMOL/L — SIGNIFICANT CHANGE UP (ref 135–145)
SODIUM SERPL-SCNC: 141 MMOL/L — SIGNIFICANT CHANGE UP (ref 135–145)
SP GR SPEC: 1 — LOW (ref 1–1.03)
T3 SERPL-MCNC: 104 NG/DL — SIGNIFICANT CHANGE UP (ref 80–200)
T4 AB SER-ACNC: 5 UG/DL — SIGNIFICANT CHANGE UP (ref 4.6–12)
T4 FREE SERPL-MCNC: 1 NG/DL — SIGNIFICANT CHANGE UP (ref 0.9–1.8)
TRIGL SERPL-MCNC: 43 MG/DL — SIGNIFICANT CHANGE UP
TROPONIN I, HIGH SENSITIVITY RESULT: 191.1 NG/L — HIGH
TROPONIN I, HIGH SENSITIVITY RESULT: 289.7 NG/L — HIGH
TROPONIN I, HIGH SENSITIVITY RESULT: 356.5 NG/L — HIGH
TROPONIN I, HIGH SENSITIVITY RESULT: 376.6 NG/L — HIGH
TSH SERPL-MCNC: 1.34 UIU/ML — SIGNIFICANT CHANGE UP (ref 0.36–3.74)
UROBILINOGEN FLD QL: 0.2 MG/DL — SIGNIFICANT CHANGE UP (ref 0.2–1)
WBC # BLD: 5.16 K/UL — SIGNIFICANT CHANGE UP (ref 3.8–10.5)
WBC # BLD: 6.64 K/UL — SIGNIFICANT CHANGE UP (ref 3.8–10.5)
WBC # FLD AUTO: 5.16 K/UL — SIGNIFICANT CHANGE UP (ref 3.8–10.5)
WBC # FLD AUTO: 6.64 K/UL — SIGNIFICANT CHANGE UP (ref 3.8–10.5)
WBC UR QL: SIGNIFICANT CHANGE UP /HPF (ref 0–5)

## 2024-07-03 PROCEDURE — 99223 1ST HOSP IP/OBS HIGH 75: CPT

## 2024-07-03 PROCEDURE — 93010 ELECTROCARDIOGRAM REPORT: CPT

## 2024-07-03 PROCEDURE — 99285 EMERGENCY DEPT VISIT HI MDM: CPT

## 2024-07-03 PROCEDURE — 71045 X-RAY EXAM CHEST 1 VIEW: CPT | Mod: 26

## 2024-07-03 RX ORDER — BACITRACIN 500 UNIT/G
1 OINTMENT (GRAM) TOPICAL
Refills: 0 | Status: DISCONTINUED | OUTPATIENT
Start: 2024-07-03 | End: 2024-07-04

## 2024-07-03 RX ORDER — FLUTICASONE PROPIONATE 50 UG/1
2 SPRAY, METERED NASAL
Refills: 0 | Status: DISCONTINUED | OUTPATIENT
Start: 2024-07-03 | End: 2024-07-04

## 2024-07-03 RX ORDER — ASPIRIN 325 MG/1
324 TABLET, FILM COATED ORAL ONCE
Refills: 0 | Status: COMPLETED | OUTPATIENT
Start: 2024-07-03 | End: 2024-07-03

## 2024-07-03 RX ORDER — SODIUM CHLORIDE 0.9 % (FLUSH) 0.9 %
1500 SYRINGE (ML) INJECTION ONCE
Refills: 0 | Status: COMPLETED | OUTPATIENT
Start: 2024-07-03 | End: 2024-07-03

## 2024-07-03 RX ORDER — ATORVASTATIN CALCIUM 20 MG/1
80 TABLET, FILM COATED ORAL AT BEDTIME
Refills: 0 | Status: DISCONTINUED | OUTPATIENT
Start: 2024-07-03 | End: 2024-07-05

## 2024-07-03 RX ORDER — IPRATROPIUM BROMIDE 42 UG/1
2 SPRAY NASAL
Refills: 0 | DISCHARGE

## 2024-07-03 RX ORDER — SODIUM CHLORIDE 0.9 % (FLUSH) 0.9 %
1000 SYRINGE (ML) INJECTION
Refills: 0 | Status: DISCONTINUED | OUTPATIENT
Start: 2024-07-03 | End: 2024-07-05

## 2024-07-03 RX ORDER — UMECLIDINIUM 62.5 UG/1
1 AEROSOL, POWDER ORAL
Refills: 0 | DISCHARGE

## 2024-07-03 RX ORDER — TIOTROPIUM BROMIDE 18 UG/1
2 CAPSULE ORAL; RESPIRATORY (INHALATION) DAILY
Refills: 0 | Status: DISCONTINUED | OUTPATIENT
Start: 2024-07-03 | End: 2024-07-04

## 2024-07-03 RX ORDER — APIXABAN 5 MG/1
2.5 TABLET, FILM COATED ORAL EVERY 12 HOURS
Refills: 0 | Status: DISCONTINUED | OUTPATIENT
Start: 2024-07-03 | End: 2024-07-05

## 2024-07-03 RX ORDER — METOPROLOL TARTRATE 50 MG
25 TABLET ORAL DAILY
Refills: 0 | Status: DISCONTINUED | OUTPATIENT
Start: 2024-07-03 | End: 2024-07-04

## 2024-07-03 RX ORDER — SODIUM CHLORIDE 0.9 % (FLUSH) 0.9 %
1000 SYRINGE (ML) INJECTION ONCE
Refills: 0 | Status: COMPLETED | OUTPATIENT
Start: 2024-07-03 | End: 2024-07-03

## 2024-07-03 RX ORDER — FAMOTIDINE 40 MG
20 TABLET ORAL
Refills: 0 | Status: DISCONTINUED | OUTPATIENT
Start: 2024-07-03 | End: 2024-07-05

## 2024-07-03 RX ORDER — ONDANSETRON HYDROCHLORIDE 2 MG/ML
4 INJECTION INTRAMUSCULAR; INTRAVENOUS EVERY 8 HOURS
Refills: 0 | Status: DISCONTINUED | OUTPATIENT
Start: 2024-07-03 | End: 2024-07-05

## 2024-07-03 RX ORDER — MAGNESIUM, ALUMINUM HYDROXIDE 400-400
30 TABLET,CHEWABLE ORAL EVERY 4 HOURS
Refills: 0 | Status: DISCONTINUED | OUTPATIENT
Start: 2024-07-03 | End: 2024-07-05

## 2024-07-03 RX ORDER — DILTIAZEM HYDROCHLORIDE 240 MG/1
12 CAPSULE, EXTENDED RELEASE ORAL ONCE
Refills: 0 | Status: DISCONTINUED | OUTPATIENT
Start: 2024-07-03 | End: 2024-07-03

## 2024-07-03 RX ORDER — FAMOTIDINE 40 MG
20 TABLET ORAL DAILY
Refills: 0 | Status: DISCONTINUED | OUTPATIENT
Start: 2024-07-03 | End: 2024-07-03

## 2024-07-03 RX ORDER — ACETAMINOPHEN 325 MG
650 TABLET ORAL EVERY 6 HOURS
Refills: 0 | Status: DISCONTINUED | OUTPATIENT
Start: 2024-07-03 | End: 2024-07-05

## 2024-07-03 RX ADMIN — ASPIRIN 324 MILLIGRAM(S): 325 TABLET, FILM COATED ORAL at 03:15

## 2024-07-03 RX ADMIN — ATORVASTATIN CALCIUM 80 MILLIGRAM(S): 20 TABLET, FILM COATED ORAL at 22:14

## 2024-07-03 RX ADMIN — Medication 1500 MILLILITER(S): at 02:32

## 2024-07-03 RX ADMIN — Medication 20 MILLIGRAM(S): at 17:53

## 2024-07-03 RX ADMIN — Medication 30 MILLILITER(S): at 06:57

## 2024-07-03 RX ADMIN — Medication 1000 MILLILITER(S): at 02:32

## 2024-07-03 RX ADMIN — Medication 20 MILLIGRAM(S): at 13:56

## 2024-07-03 RX ADMIN — Medication 1 APPLICATION(S): at 17:53

## 2024-07-03 RX ADMIN — APIXABAN 2.5 MILLIGRAM(S): 5 TABLET, FILM COATED ORAL at 17:53

## 2024-07-04 ENCOUNTER — RESULT REVIEW (OUTPATIENT)
Age: 84
End: 2024-07-04

## 2024-07-04 LAB
ALBUMIN SERPL ELPH-MCNC: 3.1 G/DL — LOW (ref 3.3–5)
ALP SERPL-CCNC: 70 U/L — SIGNIFICANT CHANGE UP (ref 40–120)
ALT FLD-CCNC: 92 U/L — HIGH (ref 12–78)
ANION GAP SERPL CALC-SCNC: 6 MMOL/L — SIGNIFICANT CHANGE UP (ref 5–17)
AST SERPL-CCNC: 77 U/L — HIGH (ref 15–37)
BASOPHILS # BLD AUTO: 0.06 K/UL — SIGNIFICANT CHANGE UP (ref 0–0.2)
BASOPHILS NFR BLD AUTO: 1.3 % — SIGNIFICANT CHANGE UP (ref 0–2)
BILIRUB SERPL-MCNC: 0.6 MG/DL — SIGNIFICANT CHANGE UP (ref 0.2–1.2)
BUN SERPL-MCNC: 9 MG/DL — SIGNIFICANT CHANGE UP (ref 7–23)
CALCIUM SERPL-MCNC: 8.8 MG/DL — SIGNIFICANT CHANGE UP (ref 8.5–10.1)
CHLORIDE SERPL-SCNC: 107 MMOL/L — SIGNIFICANT CHANGE UP (ref 96–108)
CO2 SERPL-SCNC: 26 MMOL/L — SIGNIFICANT CHANGE UP (ref 22–31)
CREAT SERPL-MCNC: 0.42 MG/DL — LOW (ref 0.5–1.3)
CULTURE RESULTS: SIGNIFICANT CHANGE UP
EGFR: 98 ML/MIN/1.73M2 — SIGNIFICANT CHANGE UP
EOSINOPHIL # BLD AUTO: 0.21 K/UL — SIGNIFICANT CHANGE UP (ref 0–0.5)
EOSINOPHIL NFR BLD AUTO: 4.6 % — SIGNIFICANT CHANGE UP (ref 0–6)
GLUCOSE SERPL-MCNC: 87 MG/DL — SIGNIFICANT CHANGE UP (ref 70–99)
HCT VFR BLD CALC: 37.1 % — SIGNIFICANT CHANGE UP (ref 34.5–45)
HGB BLD-MCNC: 12.1 G/DL — SIGNIFICANT CHANGE UP (ref 11.5–15.5)
IMM GRANULOCYTES NFR BLD AUTO: 0 % — SIGNIFICANT CHANGE UP (ref 0–0.9)
LYMPHOCYTES # BLD AUTO: 2.1 K/UL — SIGNIFICANT CHANGE UP (ref 1–3.3)
LYMPHOCYTES # BLD AUTO: 46.1 % — HIGH (ref 13–44)
MAGNESIUM SERPL-MCNC: 1.8 MG/DL — SIGNIFICANT CHANGE UP (ref 1.6–2.6)
MCHC RBC-ENTMCNC: 31.5 PG — SIGNIFICANT CHANGE UP (ref 27–34)
MCHC RBC-ENTMCNC: 32.6 GM/DL — SIGNIFICANT CHANGE UP (ref 32–36)
MCV RBC AUTO: 96.6 FL — SIGNIFICANT CHANGE UP (ref 80–100)
MONOCYTES # BLD AUTO: 0.47 K/UL — SIGNIFICANT CHANGE UP (ref 0–0.9)
MONOCYTES NFR BLD AUTO: 10.3 % — SIGNIFICANT CHANGE UP (ref 2–14)
NEUTROPHILS # BLD AUTO: 1.72 K/UL — LOW (ref 1.8–7.4)
NEUTROPHILS NFR BLD AUTO: 37.7 % — LOW (ref 43–77)
NRBC # BLD: 0 /100 WBCS — SIGNIFICANT CHANGE UP (ref 0–0)
PHOSPHATE SERPL-MCNC: 3.4 MG/DL — SIGNIFICANT CHANGE UP (ref 2.5–4.5)
PLATELET # BLD AUTO: 250 K/UL — SIGNIFICANT CHANGE UP (ref 150–400)
POTASSIUM SERPL-MCNC: 4 MMOL/L — SIGNIFICANT CHANGE UP (ref 3.5–5.3)
POTASSIUM SERPL-SCNC: 4 MMOL/L — SIGNIFICANT CHANGE UP (ref 3.5–5.3)
PROT SERPL-MCNC: 6.5 G/DL — SIGNIFICANT CHANGE UP (ref 6–8.3)
RBC # BLD: 3.84 M/UL — SIGNIFICANT CHANGE UP (ref 3.8–5.2)
RBC # FLD: 13.2 % — SIGNIFICANT CHANGE UP (ref 10.3–14.5)
SODIUM SERPL-SCNC: 139 MMOL/L — SIGNIFICANT CHANGE UP (ref 135–145)
SPECIMEN SOURCE: SIGNIFICANT CHANGE UP
WBC # BLD: 4.56 K/UL — SIGNIFICANT CHANGE UP (ref 3.8–10.5)
WBC # FLD AUTO: 4.56 K/UL — SIGNIFICANT CHANGE UP (ref 3.8–10.5)

## 2024-07-04 PROCEDURE — 93306 TTE W/DOPPLER COMPLETE: CPT | Mod: 26

## 2024-07-04 PROCEDURE — 99233 SBSQ HOSP IP/OBS HIGH 50: CPT | Mod: 25

## 2024-07-04 RX ORDER — IPRATROPIUM BROMIDE 42 UG/1
2 SPRAY NASAL
Refills: 0 | Status: DISCONTINUED | OUTPATIENT
Start: 2024-07-04 | End: 2024-07-05

## 2024-07-04 RX ORDER — METOPROLOL TARTRATE 50 MG
1 TABLET ORAL
Qty: 0 | Refills: 0 | DISCHARGE
Start: 2024-07-04

## 2024-07-04 RX ORDER — METOPROLOL TARTRATE 50 MG
50 TABLET ORAL DAILY
Refills: 0 | Status: DISCONTINUED | OUTPATIENT
Start: 2024-07-04 | End: 2024-07-05

## 2024-07-04 RX ORDER — FLUNISOLIDE/MENTHOL 250 MCG
2 AEROSOL WITH ADAPTER (GRAM) INHALATION
Refills: 0 | Status: DISCONTINUED | OUTPATIENT
Start: 2024-07-04 | End: 2024-07-05

## 2024-07-04 RX ADMIN — Medication 20 MILLIGRAM(S): at 05:45

## 2024-07-04 RX ADMIN — Medication 50 MILLIGRAM(S): at 11:33

## 2024-07-04 RX ADMIN — Medication 2 SPRAY(S): at 17:51

## 2024-07-04 RX ADMIN — Medication 25 MILLIGRAM(S): at 05:45

## 2024-07-04 RX ADMIN — APIXABAN 2.5 MILLIGRAM(S): 5 TABLET, FILM COATED ORAL at 05:46

## 2024-07-04 RX ADMIN — IPRATROPIUM BROMIDE 2 SPRAY(S): 42 SPRAY NASAL at 17:51

## 2024-07-04 RX ADMIN — APIXABAN 2.5 MILLIGRAM(S): 5 TABLET, FILM COATED ORAL at 17:50

## 2024-07-04 RX ADMIN — IPRATROPIUM BROMIDE 2 SPRAY(S): 42 SPRAY NASAL at 05:48

## 2024-07-04 RX ADMIN — Medication 20 MILLIGRAM(S): at 17:50

## 2024-07-04 RX ADMIN — ATORVASTATIN CALCIUM 80 MILLIGRAM(S): 20 TABLET, FILM COATED ORAL at 21:38

## 2024-07-04 RX ADMIN — Medication 2 SPRAY(S): at 05:48

## 2024-07-05 ENCOUNTER — TRANSCRIPTION ENCOUNTER (OUTPATIENT)
Age: 84
End: 2024-07-05

## 2024-07-05 VITALS
SYSTOLIC BLOOD PRESSURE: 136 MMHG | WEIGHT: 106.26 LBS | TEMPERATURE: 97 F | HEART RATE: 63 BPM | RESPIRATION RATE: 18 BRPM | DIASTOLIC BLOOD PRESSURE: 75 MMHG | OXYGEN SATURATION: 94 %

## 2024-07-05 LAB
ANION GAP SERPL CALC-SCNC: 6 MMOL/L — SIGNIFICANT CHANGE UP (ref 5–17)
BUN SERPL-MCNC: 10 MG/DL — SIGNIFICANT CHANGE UP (ref 7–23)
CALCIUM SERPL-MCNC: 9.6 MG/DL — SIGNIFICANT CHANGE UP (ref 8.5–10.1)
CHLORIDE SERPL-SCNC: 103 MMOL/L — SIGNIFICANT CHANGE UP (ref 96–108)
CO2 SERPL-SCNC: 30 MMOL/L — SIGNIFICANT CHANGE UP (ref 22–31)
CREAT SERPL-MCNC: 0.53 MG/DL — SIGNIFICANT CHANGE UP (ref 0.5–1.3)
EGFR: 93 ML/MIN/1.73M2 — SIGNIFICANT CHANGE UP
GLUCOSE SERPL-MCNC: 104 MG/DL — HIGH (ref 70–99)
HCT VFR BLD CALC: 39.6 % — SIGNIFICANT CHANGE UP (ref 34.5–45)
HGB BLD-MCNC: 13.1 G/DL — SIGNIFICANT CHANGE UP (ref 11.5–15.5)
MAGNESIUM SERPL-MCNC: 2.1 MG/DL — SIGNIFICANT CHANGE UP (ref 1.6–2.6)
MCHC RBC-ENTMCNC: 31.2 PG — SIGNIFICANT CHANGE UP (ref 27–34)
MCHC RBC-ENTMCNC: 33.1 GM/DL — SIGNIFICANT CHANGE UP (ref 32–36)
MCV RBC AUTO: 94.3 FL — SIGNIFICANT CHANGE UP (ref 80–100)
NRBC # BLD: 0 /100 WBCS — SIGNIFICANT CHANGE UP (ref 0–0)
PHOSPHATE SERPL-MCNC: 3.7 MG/DL — SIGNIFICANT CHANGE UP (ref 2.5–4.5)
PLATELET # BLD AUTO: 290 K/UL — SIGNIFICANT CHANGE UP (ref 150–400)
POTASSIUM SERPL-MCNC: 3.7 MMOL/L — SIGNIFICANT CHANGE UP (ref 3.5–5.3)
POTASSIUM SERPL-SCNC: 3.7 MMOL/L — SIGNIFICANT CHANGE UP (ref 3.5–5.3)
RBC # BLD: 4.2 M/UL — SIGNIFICANT CHANGE UP (ref 3.8–5.2)
RBC # FLD: 13 % — SIGNIFICANT CHANGE UP (ref 10.3–14.5)
SODIUM SERPL-SCNC: 139 MMOL/L — SIGNIFICANT CHANGE UP (ref 135–145)
WBC # BLD: 6.69 K/UL — SIGNIFICANT CHANGE UP (ref 3.8–10.5)
WBC # FLD AUTO: 6.69 K/UL — SIGNIFICANT CHANGE UP (ref 3.8–10.5)

## 2024-07-05 PROCEDURE — 81001 URINALYSIS AUTO W/SCOPE: CPT

## 2024-07-05 PROCEDURE — 99232 SBSQ HOSP IP/OBS MODERATE 35: CPT

## 2024-07-05 PROCEDURE — 85730 THROMBOPLASTIN TIME PARTIAL: CPT

## 2024-07-05 PROCEDURE — 71045 X-RAY EXAM CHEST 1 VIEW: CPT

## 2024-07-05 PROCEDURE — 93005 ELECTROCARDIOGRAM TRACING: CPT

## 2024-07-05 PROCEDURE — 85025 COMPLETE CBC W/AUTO DIFF WBC: CPT

## 2024-07-05 PROCEDURE — 83605 ASSAY OF LACTIC ACID: CPT

## 2024-07-05 PROCEDURE — 0225U NFCT DS DNA&RNA 21 SARSCOV2: CPT

## 2024-07-05 PROCEDURE — 85027 COMPLETE CBC AUTOMATED: CPT

## 2024-07-05 PROCEDURE — 84439 ASSAY OF FREE THYROXINE: CPT

## 2024-07-05 PROCEDURE — 84436 ASSAY OF TOTAL THYROXINE: CPT

## 2024-07-05 PROCEDURE — 82550 ASSAY OF CK (CPK): CPT

## 2024-07-05 PROCEDURE — 84443 ASSAY THYROID STIM HORMONE: CPT

## 2024-07-05 PROCEDURE — 80061 LIPID PANEL: CPT

## 2024-07-05 PROCEDURE — 85610 PROTHROMBIN TIME: CPT

## 2024-07-05 PROCEDURE — 83735 ASSAY OF MAGNESIUM: CPT

## 2024-07-05 PROCEDURE — 84100 ASSAY OF PHOSPHORUS: CPT

## 2024-07-05 PROCEDURE — 93306 TTE W/DOPPLER COMPLETE: CPT

## 2024-07-05 PROCEDURE — 83036 HEMOGLOBIN GLYCOSYLATED A1C: CPT

## 2024-07-05 PROCEDURE — 84484 ASSAY OF TROPONIN QUANT: CPT

## 2024-07-05 PROCEDURE — 99285 EMERGENCY DEPT VISIT HI MDM: CPT

## 2024-07-05 PROCEDURE — 84480 ASSAY TRIIODOTHYRONINE (T3): CPT

## 2024-07-05 PROCEDURE — 36415 COLL VENOUS BLD VENIPUNCTURE: CPT

## 2024-07-05 PROCEDURE — 87086 URINE CULTURE/COLONY COUNT: CPT

## 2024-07-05 PROCEDURE — 82553 CREATINE MB FRACTION: CPT

## 2024-07-05 PROCEDURE — 97162 PT EVAL MOD COMPLEX 30 MIN: CPT

## 2024-07-05 PROCEDURE — 83880 ASSAY OF NATRIURETIC PEPTIDE: CPT

## 2024-07-05 PROCEDURE — 80048 BASIC METABOLIC PNL TOTAL CA: CPT

## 2024-07-05 PROCEDURE — 87040 BLOOD CULTURE FOR BACTERIA: CPT

## 2024-07-05 PROCEDURE — 80053 COMPREHEN METABOLIC PANEL: CPT

## 2024-07-05 RX ORDER — METOPROLOL TARTRATE 50 MG
1 TABLET ORAL
Qty: 30 | Refills: 0
Start: 2024-07-05 | End: 2024-08-03

## 2024-07-05 RX ADMIN — Medication 20 MILLIGRAM(S): at 05:47

## 2024-07-05 RX ADMIN — Medication 50 MILLIGRAM(S): at 05:47

## 2024-07-05 RX ADMIN — APIXABAN 2.5 MILLIGRAM(S): 5 TABLET, FILM COATED ORAL at 05:47

## 2024-07-05 RX ADMIN — IPRATROPIUM BROMIDE 2 SPRAY(S): 42 SPRAY NASAL at 05:49

## 2024-07-05 RX ADMIN — Medication 2 SPRAY(S): at 05:50

## 2024-07-08 ENCOUNTER — NON-APPOINTMENT (OUTPATIENT)
Age: 84
End: 2024-07-08

## 2024-07-09 ENCOUNTER — APPOINTMENT (OUTPATIENT)
Dept: CARDIOLOGY | Facility: CLINIC | Age: 84
End: 2024-07-09
Payer: MEDICARE

## 2024-07-09 ENCOUNTER — NON-APPOINTMENT (OUTPATIENT)
Age: 84
End: 2024-07-09

## 2024-07-09 VITALS
HEART RATE: 79 BPM | HEIGHT: 63 IN | WEIGHT: 108 LBS | SYSTOLIC BLOOD PRESSURE: 128 MMHG | BODY MASS INDEX: 19.14 KG/M2 | OXYGEN SATURATION: 96 % | DIASTOLIC BLOOD PRESSURE: 71 MMHG

## 2024-07-09 DIAGNOSIS — I48.0 PAROXYSMAL ATRIAL FIBRILLATION: ICD-10-CM

## 2024-07-09 PROCEDURE — G2211 COMPLEX E/M VISIT ADD ON: CPT

## 2024-07-09 PROCEDURE — 99215 OFFICE O/P EST HI 40 MIN: CPT

## 2024-07-09 PROCEDURE — 93000 ELECTROCARDIOGRAM COMPLETE: CPT

## 2024-07-11 ENCOUNTER — NON-APPOINTMENT (OUTPATIENT)
Age: 84
End: 2024-07-11

## 2024-08-01 ENCOUNTER — APPOINTMENT (OUTPATIENT)
Dept: ELECTROPHYSIOLOGY | Facility: CLINIC | Age: 84
End: 2024-08-01
Payer: MEDICARE

## 2024-08-01 ENCOUNTER — NON-APPOINTMENT (OUTPATIENT)
Age: 84
End: 2024-08-01

## 2024-08-01 PROCEDURE — 93298 REM INTERROG DEV EVAL SCRMS: CPT

## 2024-08-08 ENCOUNTER — APPOINTMENT (OUTPATIENT)
Dept: NEUROLOGY | Facility: CLINIC | Age: 84
End: 2024-08-08

## 2024-08-08 PROCEDURE — 99215 OFFICE O/P EST HI 40 MIN: CPT

## 2024-08-08 PROCEDURE — G2211 COMPLEX E/M VISIT ADD ON: CPT

## 2024-08-08 NOTE — DISCUSSION/SUMMARY
[FreeTextEntry1] : Ms. Lowe is an 83-year-old female with history of RA, COPD (former smoker), GERD, macular degeneration, migraine headache with aura now almost 5 months s/p TIA, found to have scattered small acute/subacute infarcts in the left MCA territory s/p TNK. Etiology likely cardio embolic, as she was found to be in AF while in the hospital last month. She will continue anticoagulation, statin therapy and metoprolol. Aggressive risk factor modification should be continued for secondary stroke prevention, consisting intensive blood pressure control and lipid lowering therapy. We discussed the importance and utility of daily exercise to help with fatigue and anxiety as well as having good sleep hygiene. LDL was 36 on lab work performed last month. Will decrease rosuvastatin to 20 mg QHS. She will follow up with me in 4 months. All of the patient's questions and concerns were addressed.

## 2024-08-08 NOTE — HISTORY OF PRESENT ILLNESS
[FreeTextEntry1] : Ms. Lowe is an 83 year-old female with history of RA, COPD (former smoker), GERD, macular degeneration, migraine headache with aura, TIA diagnosed at [facility] on 3/17/24 after an episode of expressive aphasia and right facial droop who presented to [facility] on 3/19/24 with difficulty expressing herself, found to have small acute/subacute infarcts in the left MCA territory. She presents today for follow up. he has been doing well. She reports fatigue, anxiety and chronic restless leg syndrome. She admits to not walking as often as she used to. She drinks a lot of water during the day, which causes her to have to get up several times at night to go to the bathroom and she then feels tired during the day. Restless leg sensation improves with getting up and walking around for a few minutes. She was evaluated at [facility] on 7/3/24 for dehydration and found to be in AF on cardiac monitor. She has been compliance with Eliquis and statin therapy, tolerating both well. She had a sleep study performed last week, results still pending.

## 2024-09-04 ENCOUNTER — APPOINTMENT (OUTPATIENT)
Dept: OBGYN | Facility: CLINIC | Age: 84
End: 2024-09-04
Payer: MEDICARE

## 2024-09-04 PROCEDURE — ZZZZZ: CPT

## 2024-09-05 ENCOUNTER — APPOINTMENT (OUTPATIENT)
Dept: ELECTROPHYSIOLOGY | Facility: CLINIC | Age: 84
End: 2024-09-05
Payer: MEDICARE

## 2024-09-05 ENCOUNTER — NON-APPOINTMENT (OUTPATIENT)
Age: 84
End: 2024-09-05

## 2024-09-05 PROCEDURE — 93298 REM INTERROG DEV EVAL SCRMS: CPT

## 2024-10-03 ENCOUNTER — APPOINTMENT (OUTPATIENT)
Dept: CARDIOLOGY | Facility: CLINIC | Age: 84
End: 2024-10-03
Payer: MEDICARE

## 2024-10-03 ENCOUNTER — NON-APPOINTMENT (OUTPATIENT)
Age: 84
End: 2024-10-03

## 2024-10-03 VITALS
BODY MASS INDEX: 18.96 KG/M2 | HEIGHT: 63 IN | DIASTOLIC BLOOD PRESSURE: 77 MMHG | SYSTOLIC BLOOD PRESSURE: 125 MMHG | WEIGHT: 107 LBS | OXYGEN SATURATION: 97 % | HEART RATE: 66 BPM

## 2024-10-03 DIAGNOSIS — I35.1 NONRHEUMATIC AORTIC (VALVE) INSUFFICIENCY: ICD-10-CM

## 2024-10-03 DIAGNOSIS — I34.0 NONRHEUMATIC MITRAL (VALVE) INSUFFICIENCY: ICD-10-CM

## 2024-10-03 DIAGNOSIS — I48.0 PAROXYSMAL ATRIAL FIBRILLATION: ICD-10-CM

## 2024-10-03 PROCEDURE — G2211 COMPLEX E/M VISIT ADD ON: CPT

## 2024-10-03 PROCEDURE — 93000 ELECTROCARDIOGRAM COMPLETE: CPT

## 2024-10-03 PROCEDURE — 99214 OFFICE O/P EST MOD 30 MIN: CPT

## 2024-10-03 RX ORDER — UMECLIDINIUM 62.5 UG/1
62.5 AEROSOL, POWDER ORAL
Refills: 0 | Status: ACTIVE | COMMUNITY

## 2024-10-03 NOTE — REASON FOR VISIT
[CV Risk Factors and Non-Cardiac Disease] : CV risk factors and non-cardiac disease [Arrhythmia/ECG Abnorrmalities] : arrhythmia/ECG abnormalities [Hyperlipidemia] : hyperlipidemia [Hypertension] : hypertension [Carotid, Aortic and Peripheral Vascular Disease] : carotid, aortic and peripheral vascular disease [Structural Heart and Valve Disease] : structural heart and valve disease

## 2024-10-06 NOTE — HISTORY OF PRESENT ILLNESS
[FreeTextEntry1] : 83 year old female with history of RA on Actemra, COPD (former smoker), GERD, macular degeneration, migraine headache with aura, TIA/ CVA, PAF on AC presents for a follwoup visit.   She sustained a TIA diagnosed at [facility] on 3/17/24 after an episode of expressive aphasia and right facial droop who presented to [facility] on 3/19/24 with difficulty expressing herself and presents today for hospital follow up. She was originally brought to  by her family after an episode of difficulty expressing herself, lasting about 1.5 minutes before resolving completely. 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 She subsequently signed out AMA but presented to [facility] on 3/19/24. She states that she was driving when she began to feel sudden onset fatigue, subsequently developing garbled speech and difficulty expressing herself, lasting about 30 minutes. However, while in the ED, the patients symptoms returned, and she was unable to speak. She had an NIHSS of 4 for right facial weakness, trace dysarthria, and difficulty with word finding. CTH revealed a calcific density in the left MCA insula branch. She was given TNK and returned back to baseline, but then developed difficulty word finding again on exam. MRI head showed scattered small acute/subacute infarcts in the left MCA territory. Bilateral carotid US was unremarkable. TTE showed an EF of 55-60% with no LAE, PFO. She reports compliance with ASA/Plavix with no signs of bleeding.   Holter in April 2024- no afib. She now is S/P ILR implant on June 12 2024. ILR remote monitoring transmission with recorded AF with RVR with rates up to 130s.   Patient went to [facility] ED, where she spontaneously converted to sinus after 2.5L IV bolus.  Eliquis (2.5mg BID); reduced dosage based on criteria of age > 80 and weight <60kg.  She has been noted to be more tired and at time feels sleepy when driving.  She had follow up with pulmonology, diagnosed with severe MADISON pending CPAP/equipment  Toprol was decreased to 37.5mg due to fatigue.  She   denies any chest pain, PND, orthopnea, lower extremity edema, near syncope, syncope, strokelike symptoms.  Medication reconciliation performed. She is compliant with her medications.

## 2024-10-06 NOTE — CARDIOLOGY SUMMARY
[de-identified] : SR  [de-identified] : 3/2024 TTE showed an EF of 55-60% with no LA, moderate AI, moderate to severe TR.  7/2024 Left ventricular cavity is normal in size. Left ventricular systolic function is normal with an ejection fraction of 57 % by Blankenship's method of disks.Mild to moderate mitral regurgitation  Moderate aortic regurgitation. [de-identified] : s/p ILR on 6/12/24 [de-identified] : ILR remote 9/5/2024: no afib, PVC burden <0.1%

## 2024-10-06 NOTE — DISCUSSION/SUMMARY
[EKG obtained to assist in diagnosis and management of assessed problem(s)] : EKG obtained to assist in diagnosis and management of assessed problem(s) [FreeTextEntry1] : Ms Lowe 83 year woman with a history as listed presents for a followup cardiac evaluation.  Cierra arrives in no acute distress. No new focal deficits. She is euvolemic on exam.  ECG illustrates sinus rhythm. Her B/P is controlled.  She had an echocardiogram performed recently demonstrating LVEF 55-60%, mild-mod MR and mod AI. HEr latest ILR remote transmission was without AF events, AF burden 0.0%. For now, She will continue Toprol  37.5mg qday for rate control.  If she has more recurrent PAF will consider Multaq. She will continue Eliquis 2.5mg q12 (age and weight).  She denies any anginal symptoms. Clinically she is euvolemic on exam. Her EKG did not reveal any significant ischemic changes.    She will continue rosuvastatin 20mg to achieve maintain goal LDL<100 or ideally <70 given hx of ischemic strokes. Fu with neurology.   She will follow up with pulmonary regarding sleep apnea management.  we discussed the importance of addressing modifiable risk factors in the setting of atrial fibrillation.  Exercise and diet counseling was performed in order to reduce her future cardiovascular risk.   She will follow up with me in  3 months or sooner if necessary.

## 2024-10-06 NOTE — HISTORY OF PRESENT ILLNESS
[FreeTextEntry1] : 83 year old female with history of RA on Actemra, COPD (former smoker), GERD, macular degeneration, migraine headache with aura, TIA/ CVA, PAF on AC presents for a follwoup visit.   She sustained a TIA diagnosed at [facility] on 3/17/24 after an episode of expressive aphasia and right facial droop who presented to [facility] on 3/19/24 with difficulty expressing herself and presents today for hospital follow up. She was originally brought to  by her family after an episode of difficulty expressing herself, lasting about 1.5 minutes before resolving completely. 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 She subsequently signed out AMA but presented to [facility] on 3/19/24. She states that she was driving when she began to feel sudden onset fatigue, subsequently developing garbled speech and difficulty expressing herself, lasting about 30 minutes. However, while in the ED, the patients symptoms returned, and she was unable to speak. She had an NIHSS of 4 for right facial weakness, trace dysarthria, and difficulty with word finding. CTH revealed a calcific density in the left MCA insula branch. She was given TNK and returned back to baseline, but then developed difficulty word finding again on exam. MRI head showed scattered small acute/subacute infarcts in the left MCA territory. Bilateral carotid US was unremarkable. TTE showed an EF of 55-60% with no LAE, PFO. She reports compliance with ASA/Plavix with no signs of bleeding.   Holter in April 2024- no afib. She now is S/P ILR implant on June 12 2024. ILR remote monitoring transmission with recorded AF with RVR with rates up to 130s.   Patient went to [facility] ED, where she spontaneously converted to sinus after 2.5L IV bolus.  Eliquis (2.5mg BID); reduced dosage based on criteria of age > 80 and weight <60kg.  She has been noted to be more tired and at time feels sleepy when driving.  She had follow up with pulmonology, diagnosed with severe MADISON pending CPAP/equipment  Toprol was decreased to 37.5mg due to fatigue.  She   denies any chest pain, PND, orthopnea, lower extremity edema, near syncope, syncope, strokelike symptoms.  Medication reconciliation performed. She is compliant with her medications.

## 2024-10-06 NOTE — REVIEW OF SYSTEMS
[Under Stress] : under stress [Negative] : Heme/Lymph [Feeling Fatigued] : feeling fatigued [FreeTextEntry2] : see HPI

## 2024-10-06 NOTE — CARDIOLOGY SUMMARY
[de-identified] : SR  [de-identified] : 3/2024 TTE showed an EF of 55-60% with no LA, moderate AI, moderate to severe TR.  7/2024 Left ventricular cavity is normal in size. Left ventricular systolic function is normal with an ejection fraction of 57 % by Blankenship's method of disks.Mild to moderate mitral regurgitation  Moderate aortic regurgitation. [de-identified] : s/p ILR on 6/12/24 [de-identified] : ILR remote 9/5/2024: no afib, PVC burden <0.1%

## 2024-10-10 ENCOUNTER — APPOINTMENT (OUTPATIENT)
Dept: ELECTROPHYSIOLOGY | Facility: CLINIC | Age: 84
End: 2024-10-10
Payer: MEDICARE

## 2024-10-10 ENCOUNTER — NON-APPOINTMENT (OUTPATIENT)
Age: 84
End: 2024-10-10

## 2024-10-10 PROCEDURE — 93298 REM INTERROG DEV EVAL SCRMS: CPT

## 2024-11-14 ENCOUNTER — APPOINTMENT (OUTPATIENT)
Dept: ELECTROPHYSIOLOGY | Facility: CLINIC | Age: 84
End: 2024-11-14
Payer: MEDICARE

## 2024-11-14 ENCOUNTER — NON-APPOINTMENT (OUTPATIENT)
Age: 84
End: 2024-11-14

## 2024-11-14 PROCEDURE — 93298 REM INTERROG DEV EVAL SCRMS: CPT

## 2024-12-12 ENCOUNTER — APPOINTMENT (OUTPATIENT)
Dept: NEUROLOGY | Facility: CLINIC | Age: 84
End: 2024-12-12
Payer: MEDICARE

## 2024-12-12 VITALS
HEIGHT: 63 IN | HEART RATE: 89 BPM | WEIGHT: 110 LBS | OXYGEN SATURATION: 97 % | DIASTOLIC BLOOD PRESSURE: 60 MMHG | SYSTOLIC BLOOD PRESSURE: 118 MMHG | BODY MASS INDEX: 19.49 KG/M2

## 2024-12-12 DIAGNOSIS — I63.9 CEREBRAL INFARCTION, UNSPECIFIED: ICD-10-CM

## 2024-12-12 DIAGNOSIS — I48.0 PAROXYSMAL ATRIAL FIBRILLATION: ICD-10-CM

## 2024-12-12 PROCEDURE — 99215 OFFICE O/P EST HI 40 MIN: CPT

## 2024-12-12 PROCEDURE — G2211 COMPLEX E/M VISIT ADD ON: CPT

## 2024-12-17 ENCOUNTER — NON-APPOINTMENT (OUTPATIENT)
Age: 84
End: 2024-12-17

## 2024-12-19 ENCOUNTER — APPOINTMENT (OUTPATIENT)
Dept: ELECTROPHYSIOLOGY | Facility: CLINIC | Age: 84
End: 2024-12-19

## 2024-12-19 PROCEDURE — 93298 REM INTERROG DEV EVAL SCRMS: CPT

## 2025-01-07 ENCOUNTER — APPOINTMENT (OUTPATIENT)
Dept: NEUROLOGY | Facility: CLINIC | Age: 85
End: 2025-01-07

## 2025-01-07 PROCEDURE — 93888 INTRACRANIAL LIMITED STUDY: CPT

## 2025-01-07 PROCEDURE — 93880 EXTRACRANIAL BILAT STUDY: CPT

## 2025-01-17 ENCOUNTER — RX RENEWAL (OUTPATIENT)
Age: 85
End: 2025-01-17

## 2025-01-22 ENCOUNTER — APPOINTMENT (OUTPATIENT)
Dept: DERMATOLOGY | Facility: CLINIC | Age: 85
End: 2025-01-22
Payer: MEDICARE

## 2025-01-22 DIAGNOSIS — L30.9 DERMATITIS, UNSPECIFIED: ICD-10-CM

## 2025-01-22 DIAGNOSIS — L82.1 OTHER SEBORRHEIC KERATOSIS: ICD-10-CM

## 2025-01-22 DIAGNOSIS — Z85.828 PERSONAL HISTORY OF OTHER MALIGNANT NEOPLASM OF SKIN: ICD-10-CM

## 2025-01-22 DIAGNOSIS — L81.4 OTHER MELANIN HYPERPIGMENTATION: ICD-10-CM

## 2025-01-22 PROCEDURE — 99214 OFFICE O/P EST MOD 30 MIN: CPT

## 2025-01-23 ENCOUNTER — APPOINTMENT (OUTPATIENT)
Dept: ELECTROPHYSIOLOGY | Facility: CLINIC | Age: 85
End: 2025-01-23
Payer: MEDICARE

## 2025-01-23 ENCOUNTER — NON-APPOINTMENT (OUTPATIENT)
Age: 85
End: 2025-01-23

## 2025-01-23 PROCEDURE — 93298 REM INTERROG DEV EVAL SCRMS: CPT

## 2025-02-27 ENCOUNTER — NON-APPOINTMENT (OUTPATIENT)
Age: 85
End: 2025-02-27

## 2025-02-27 ENCOUNTER — APPOINTMENT (OUTPATIENT)
Dept: ELECTROPHYSIOLOGY | Facility: CLINIC | Age: 85
End: 2025-02-27
Payer: MEDICARE

## 2025-02-27 PROCEDURE — 93298 REM INTERROG DEV EVAL SCRMS: CPT

## 2025-02-27 NOTE — DIETITIAN INITIAL EVALUATION ADULT - PATIENT MEETS CRITERIA FOR MALNUTRITION
Indication: scarring Anesthesia Type: 1% lidocaine with epinephrine Skin Preparation: chlorhexidine Dermabrasion Type: Localized, Face Vacuum Use: No Consent: Prior to the procedure the rationale for dermabrasion was explained to the patient and consent was obtained. The risks, benefits and alternatives to therapy were discussed in detail. Specifically, the risks of infection, reactivation of herpes virus, edema, erythema, hyperpigmentation, hypopigmentation, scarring, bleeding, prolonged wound healing, and allergy to anesthesia were addressed. Vacuum Pressure Units: inches Hg Render Post-Care Instructions In The Note?: Yes Dressing: telfa dressing Dermabrasion Method: a hand-held device Wound Check: 6 weeks Anesthesia Volume In Cc: 1.5 Hemostasis: Aluminum Chloride Detail Level: Detailed Wound Care: Aquaphor Post-Care Instructions: After application of sterile pressure dressing, post-care instructions reviewed in detail. Patient is to apply white petrolatum, or equivalent, multiple times a day until the treated areas are completely healed.  If antiviral medications are started they should be continued until treated areas are completely healed-signs and symptoms of viral skin infection reviewed.  Avoid topical medications, cosmetics and sunscreens until completely healed. Treatment Number: 1 yes

## 2025-04-01 ENCOUNTER — NON-APPOINTMENT (OUTPATIENT)
Age: 85
End: 2025-04-01

## 2025-04-03 ENCOUNTER — APPOINTMENT (OUTPATIENT)
Dept: ELECTROPHYSIOLOGY | Facility: CLINIC | Age: 85
End: 2025-04-03

## 2025-04-03 ENCOUNTER — NON-APPOINTMENT (OUTPATIENT)
Age: 85
End: 2025-04-03

## 2025-04-03 ENCOUNTER — APPOINTMENT (OUTPATIENT)
Dept: CARDIOLOGY | Facility: CLINIC | Age: 85
End: 2025-04-03
Payer: MEDICARE

## 2025-04-03 VITALS
DIASTOLIC BLOOD PRESSURE: 78 MMHG | WEIGHT: 109 LBS | HEART RATE: 80 BPM | BODY MASS INDEX: 19.31 KG/M2 | HEIGHT: 63 IN | SYSTOLIC BLOOD PRESSURE: 120 MMHG | OXYGEN SATURATION: 97 %

## 2025-04-03 DIAGNOSIS — I47.19 OTHER SUPRAVENTRICULAR TACHYCARDIA: ICD-10-CM

## 2025-04-03 DIAGNOSIS — I48.0 PAROXYSMAL ATRIAL FIBRILLATION: ICD-10-CM

## 2025-04-03 PROCEDURE — 93298 REM INTERROG DEV EVAL SCRMS: CPT

## 2025-04-03 PROCEDURE — G2211 COMPLEX E/M VISIT ADD ON: CPT

## 2025-04-03 PROCEDURE — 93000 ELECTROCARDIOGRAM COMPLETE: CPT

## 2025-04-03 PROCEDURE — 99214 OFFICE O/P EST MOD 30 MIN: CPT

## 2025-05-08 ENCOUNTER — NON-APPOINTMENT (OUTPATIENT)
Age: 85
End: 2025-05-08

## 2025-05-08 ENCOUNTER — APPOINTMENT (OUTPATIENT)
Dept: ELECTROPHYSIOLOGY | Facility: CLINIC | Age: 85
End: 2025-05-08

## 2025-05-08 PROCEDURE — 93298 REM INTERROG DEV EVAL SCRMS: CPT

## 2025-05-09 DIAGNOSIS — M79.89 OTHER SPECIFIED SOFT TISSUE DISORDERS: ICD-10-CM

## 2025-05-12 ENCOUNTER — NON-APPOINTMENT (OUTPATIENT)
Age: 85
End: 2025-05-12

## 2025-05-12 ENCOUNTER — EMERGENCY (EMERGENCY)
Facility: HOSPITAL | Age: 85
LOS: 1 days | End: 2025-05-12
Attending: STUDENT IN AN ORGANIZED HEALTH CARE EDUCATION/TRAINING PROGRAM | Admitting: STUDENT IN AN ORGANIZED HEALTH CARE EDUCATION/TRAINING PROGRAM
Payer: MEDICARE

## 2025-05-12 VITALS
TEMPERATURE: 97 F | OXYGEN SATURATION: 94 % | SYSTOLIC BLOOD PRESSURE: 114 MMHG | WEIGHT: 110.01 LBS | HEIGHT: 63 IN | RESPIRATION RATE: 16 BRPM | HEART RATE: 90 BPM | DIASTOLIC BLOOD PRESSURE: 70 MMHG

## 2025-05-12 VITALS
DIASTOLIC BLOOD PRESSURE: 79 MMHG | OXYGEN SATURATION: 95 % | SYSTOLIC BLOOD PRESSURE: 119 MMHG | TEMPERATURE: 98 F | HEART RATE: 79 BPM | RESPIRATION RATE: 16 BRPM

## 2025-05-12 DIAGNOSIS — Z98.890 OTHER SPECIFIED POSTPROCEDURAL STATES: Chronic | ICD-10-CM

## 2025-05-12 DIAGNOSIS — Z98.49 CATARACT EXTRACTION STATUS, UNSPECIFIED EYE: Chronic | ICD-10-CM

## 2025-05-12 LAB
ALBUMIN SERPL ELPH-MCNC: 3.1 G/DL — LOW (ref 3.3–5)
ALP SERPL-CCNC: 91 U/L — SIGNIFICANT CHANGE UP (ref 40–120)
ALT FLD-CCNC: 25 U/L — SIGNIFICANT CHANGE UP (ref 12–78)
ANION GAP SERPL CALC-SCNC: 7 MMOL/L — SIGNIFICANT CHANGE UP (ref 5–17)
APTT BLD: 28 SEC — SIGNIFICANT CHANGE UP (ref 26.1–36.8)
AST SERPL-CCNC: 34 U/L — SIGNIFICANT CHANGE UP (ref 15–37)
BASOPHILS # BLD AUTO: 0.07 K/UL — SIGNIFICANT CHANGE UP (ref 0–0.2)
BASOPHILS NFR BLD AUTO: 0.8 % — SIGNIFICANT CHANGE UP (ref 0–2)
BILIRUB SERPL-MCNC: 0.8 MG/DL — SIGNIFICANT CHANGE UP (ref 0.2–1.2)
BUN SERPL-MCNC: 15 MG/DL — SIGNIFICANT CHANGE UP (ref 7–23)
CALCIUM SERPL-MCNC: 8.9 MG/DL — SIGNIFICANT CHANGE UP (ref 8.5–10.1)
CHLORIDE SERPL-SCNC: 102 MMOL/L — SIGNIFICANT CHANGE UP (ref 96–108)
CO2 SERPL-SCNC: 29 MMOL/L — SIGNIFICANT CHANGE UP (ref 22–31)
CREAT SERPL-MCNC: 0.54 MG/DL — SIGNIFICANT CHANGE UP (ref 0.5–1.3)
EGFR: 92 ML/MIN/1.73M2 — SIGNIFICANT CHANGE UP
EGFR: 92 ML/MIN/1.73M2 — SIGNIFICANT CHANGE UP
EOSINOPHIL # BLD AUTO: 0.09 K/UL — SIGNIFICANT CHANGE UP (ref 0–0.5)
EOSINOPHIL NFR BLD AUTO: 1 % — SIGNIFICANT CHANGE UP (ref 0–6)
GLUCOSE SERPL-MCNC: 104 MG/DL — HIGH (ref 70–99)
HCT VFR BLD CALC: 39.7 % — SIGNIFICANT CHANGE UP (ref 34.5–45)
HGB BLD-MCNC: 13.5 G/DL — SIGNIFICANT CHANGE UP (ref 11.5–15.5)
IMM GRANULOCYTES NFR BLD AUTO: 0.3 % — SIGNIFICANT CHANGE UP (ref 0–0.9)
INR BLD: 1.05 RATIO — SIGNIFICANT CHANGE UP (ref 0.85–1.16)
LYMPHOCYTES # BLD AUTO: 2.24 K/UL — SIGNIFICANT CHANGE UP (ref 1–3.3)
LYMPHOCYTES # BLD AUTO: 24.9 % — SIGNIFICANT CHANGE UP (ref 13–44)
MCHC RBC-ENTMCNC: 31.5 PG — SIGNIFICANT CHANGE UP (ref 27–34)
MCHC RBC-ENTMCNC: 34 G/DL — SIGNIFICANT CHANGE UP (ref 32–36)
MCV RBC AUTO: 92.8 FL — SIGNIFICANT CHANGE UP (ref 80–100)
MONOCYTES # BLD AUTO: 1.17 K/UL — HIGH (ref 0–0.9)
MONOCYTES NFR BLD AUTO: 13 % — SIGNIFICANT CHANGE UP (ref 2–14)
NEUTROPHILS # BLD AUTO: 5.39 K/UL — SIGNIFICANT CHANGE UP (ref 1.8–7.4)
NEUTROPHILS NFR BLD AUTO: 60 % — SIGNIFICANT CHANGE UP (ref 43–77)
NRBC BLD AUTO-RTO: 0 /100 WBCS — SIGNIFICANT CHANGE UP (ref 0–0)
PLATELET # BLD AUTO: 208 K/UL — SIGNIFICANT CHANGE UP (ref 150–400)
POTASSIUM SERPL-MCNC: 4 MMOL/L — SIGNIFICANT CHANGE UP (ref 3.5–5.3)
POTASSIUM SERPL-SCNC: 4 MMOL/L — SIGNIFICANT CHANGE UP (ref 3.5–5.3)
PROT SERPL-MCNC: 7.3 G/DL — SIGNIFICANT CHANGE UP (ref 6–8.3)
PROTHROM AB SERPL-ACNC: 12.4 SEC — SIGNIFICANT CHANGE UP (ref 9.9–13.4)
RBC # BLD: 4.28 M/UL — SIGNIFICANT CHANGE UP (ref 3.8–5.2)
RBC # FLD: 13.7 % — SIGNIFICANT CHANGE UP (ref 10.3–14.5)
SODIUM SERPL-SCNC: 138 MMOL/L — SIGNIFICANT CHANGE UP (ref 135–145)
WBC # BLD: 8.99 K/UL — SIGNIFICANT CHANGE UP (ref 3.8–10.5)
WBC # FLD AUTO: 8.99 K/UL — SIGNIFICANT CHANGE UP (ref 3.8–10.5)

## 2025-05-12 PROCEDURE — 93971 EXTREMITY STUDY: CPT

## 2025-05-12 PROCEDURE — 93971 EXTREMITY STUDY: CPT | Mod: 26,LT

## 2025-05-12 PROCEDURE — 99284 EMERGENCY DEPT VISIT MOD MDM: CPT | Mod: 25

## 2025-05-12 PROCEDURE — 85730 THROMBOPLASTIN TIME PARTIAL: CPT

## 2025-05-12 PROCEDURE — 36415 COLL VENOUS BLD VENIPUNCTURE: CPT

## 2025-05-12 PROCEDURE — 85610 PROTHROMBIN TIME: CPT

## 2025-05-12 PROCEDURE — 80053 COMPREHEN METABOLIC PANEL: CPT

## 2025-05-12 PROCEDURE — 85025 COMPLETE CBC W/AUTO DIFF WBC: CPT

## 2025-05-12 PROCEDURE — 99284 EMERGENCY DEPT VISIT MOD MDM: CPT

## 2025-05-12 NOTE — ED ADULT TRIAGE NOTE - CHIEF COMPLAINT QUOTE
Came in ambulatory in triage c/o pain and  swelling of left leg started Wednesday last week sent by her cardiologist concerned for DVT. Patient started on lasix last Friday. Denies fever/ chills/ chest pain.

## 2025-05-12 NOTE — ED PROVIDER NOTE - CARE PROVIDER_API CALL
Candace Rodriguez  Cardiology  [address] [place] [address]  Phone: [phone]  Fax: [phone]  Follow Up Time: 1-3 Days

## 2025-05-12 NOTE — ED ADULT NURSE NOTE - NSICDXPASTSURGICALHX_GEN_ALL_CORE_FT
- - - PAST SURGICAL HISTORY:  H/O lumpectomy 2003    H/O lumpectomy     History of cataract surgery     S/P cataract surgery

## 2025-05-12 NOTE — ED PROVIDER NOTE - OBJECTIVE STATEMENT
84year old female with pmhx of CVA, Emphysema (former smoker), HLD, GERD, Macular Degeneration, RA, Migraines, afib on eliquis pw 1 week of LLE swelling. pt had been having bl le swelling when she exercises and was told by her cards to wear compression socks which don't help but for last week having discomfort and swleling of LLE, spoke with dr albright who gave her 3 days of lasix which did not improve swelling. called dr jose ramon seth today and was tolf to get evaluated for cellulitis vs DVT. pt denies fevers chills, chest pain or sob

## 2025-05-12 NOTE — ED PROVIDER NOTE - PHYSICAL EXAMINATION
Gen: Well appearing in NAD  Head: NC/AT  Neck: trachea midline  Resp:  No distress  Ext: mild lle edema and swelling, no redness or warmth   bl DP pulses intact   Neuro:  A&O appears non focal  Skin:  Warm and dry as visualized  Psych:  Normal affect and mood

## 2025-05-12 NOTE — ED PROVIDER NOTE - CLINICAL SUMMARY MEDICAL DECISION MAKING FREE TEXT BOX
84year old female with pmhx of CVA, Emphysema (former smoker), HLD, GERD, Macular Degeneration, RA, Migraines, afib on eliquis pw 1 week of LLE swelling. pt had been having bl le swelling when she exercises and was told by her cards to wear compression socks which don't help but for last week having discomfort and swleling of LLE, spoke with dr albright who gave her 3 days of lasix which did not improve swelling. called dr jose ramon seth today and was tolf to get evaluated for cellulitis vs DVT. pt denies fevers chills, chest pain or sob  LLE swelling, no signs of cellulitis, will check basic labs and ro DVT US

## 2025-05-12 NOTE — ED ADULT NURSE NOTE - NSFALLHARMRISKINTERV_ED_ALL_ED

## 2025-05-12 NOTE — ED ADULT NURSE NOTE - HIV OFFER
Asa Clasification: I
Detail Level: Detailed
Previously Declined (within the last year)
Preoperative Diagnosis (For...Diagnosis Name): Repair for
Dicharge Condition: Stable
Time Discharged: 1100
Anesthesia #2 Volume In Cc: 2
Patient Discharged To: Spouse
Discharge Instructions (Will Render On Patient Hand-Out): 1. Supplies- You will need the following: \\n       • Water & Peroxide      \\n       • Non-Adherent Dressing or Band-Aids \\n       • Q-Tips                      \\n       • Vaseline \\n2. Wound Care\\n➢ CLEAN wound once DAILY after the pressure dressing is removed. \\n      • Clean wound with Q-Tips soaked in ½ water, ½ hydrogen peroxide. \\n      • Do not reuse Q-Tips. \\n      • Remove all crusted or white/yellow material that can come off easily.\\n      • After cleaning, generously APPLY VASELINE with a clean Q-Tip.\\n➢ Keep bandage dry \\n➢ COVER WOUND with a bandaid OR non-adherent dressing (cut to size & tape)\\n  o Keep WRAP in place for 24 hours.\\n  o Keep pressure DRESSING dry and intact ☐ 24 hours  ☐ 48 hours\\n  o Leave STERI-STRIPS in place \\n      o until they fall off   \\n      o _________________\\nContinue wound care  \\n      o until sutures are removed  \\n      o until healed or as your doctor directs\\n  o Apply ice packs, 20 minutes on, 20 minutes off for the next 24-48 hours while awake.\\n  o If repair includes a skin graft and you smoke, discontinue smoking for 1 month after your graft. \\n3. Personal Hygiene\\n    A. Showers or baths are allowed as long as the bandage remains dry, as directed. After 24hrs, the sutures may get wet; do NOT soak in water (i.e. baths, sinks, hot tubs or swimming pools/lakes).\\n    B. Heavy lifting and exercise are not allowed until the sutures are removed and/or the wound is healed. \\n4. Prescriptions \\n➢ Unless the doctor states otherwise, take 1-2 Extra Strength Tylenol every 6hrs as needed for pain. \\n➢ Alcohol should be avoided for two days.\\n  o Your doctor has prescribed an antibiotic for you to begin taking today as directed. \\n  o Your doctor has prescribed a pain pill for you to take as directed. \\n5. Potential Post-operative complications\\n➢ INFECTION: Infection seldom occurs when the wound care instructions have been carefully followed. Signs of infection include increasing redness, increased warmth, increasing pain, and white/yellow/green discharge. Contact our office if you experience one of these symptoms. \\n➢ BLEEDING: Bleeding can occur following surgery. To reduce the possibility of bleeding, follow these instructions:\\n          A. Limit your activities for at least 24 hours\\n          B. Keep the surgery site elevated\\n          C. If surgery was on the face, head, or neck:\\n                 I. Avoid stooping or bending\\n                II. Avoid Straining to have bowel movement\\n               III. Sleep with your head and shoulders elevated on extra pillows\\nIF BLEEDING OCCURS, apply firm constant pressure on the bandage for 20 minutes. That will usually stop minor bleeding. If area continues to bleed, call our office [phone] during business hours. Call our emergency physician on-call number [phone] during evenings, weekends, and holidays.
Site Marked?: Yes
Anesthesia #4 Volume In Cc: 0
Surgeon: Kristian Brnadt MD
Anesthesia #1 Type: 1% lidocaine with epinephrine
Time 'time-Out' Performed: 0137
Proposed Procedure: Flap

## 2025-05-12 NOTE — ED ADULT NURSE REASSESSMENT NOTE - NS ED NURSE REASSESS COMMENT FT1
Pt to dc home and follow up out pt with cardio.  Pt and daughter expressed understanding of dc instructions.

## 2025-05-12 NOTE — ED PROVIDER NOTE - NSFOLLOWUPINSTRUCTIONS_ED_ALL_ED_FT
1. TAKE ALL MEDICATIONS AS DIRECTED.    2. FOR PAIN OR FEVER YOU CAN TAKE ACETAMINOPHEN (TYLENOL) AS NEEDED, AS DIRECTED ON PACKAGING.  3. FOLLOW UP WITH YOUR PRIMARY DOCTOR WITHIN 5 DAYS AS DIRECTED.  4. IF YOU HAD LABS OR IMAGING DONE, YOU WERE GIVEN COPIES OF ALL LABS AND/OR IMAGING RESULTS FROM YOUR ER VISIT--PLEASE TAKE THEM WITH YOU TO YOUR FOLLOW UP APPOINTMENTS.  5. IF NEEDED, CALL PATIENT ACCESS SERVICES AT [phone] TO FIND A PRIMARY CARE PHYSICIAN.  OR CALL [phone] TO MAKE AN APPOINTMENT WITH THE CLINIC.  6. RETURN TO THE ER FOR ANY WORSENING SYMPTOMS OR CONCERNS.    Keep leg elevated while at rest   English    Peripheral Edema  A person's legs and feet. One leg is normal and the other leg is affected by edema.  Peripheral edema is swelling that is caused by a buildup of fluid. Peripheral edema most often affects the lower legs, ankles, and feet. It can also develop in the arms, hands, and face. The area of the body that has peripheral edema will look swollen. It may also feel heavy or warm. Your clothes may start to feel tight. Pressing on the area may make a temporary dent in your skin (pitting edema). You may not be able to move your swollen arm or leg as much as usual.    There are many causes of peripheral edema. It can happen because of a complication of other conditions such as heart failure, kidney disease, or a problem with your circulation. It also can be a side effect of certain medicines or happen because of an infection. It often happens to women during pregnancy. Sometimes, the cause is not known.    Follow these instructions at home:  Managing pain, stiffness, and swelling    Compression stockings on a person's lower legs.  Raise (elevate) your legs while you are sitting or lying down.  Move around often to prevent stiffness and to reduce swelling.  Do not sit or stand for long periods of time.  Do not wear tight clothing. Do not wear garters on your upper legs.  Exercise your legs to get your circulation going. This helps to move the fluid back into your blood vessels, and it may help the swelling go down.  Wear compression stockings as told by your health care provider. These stockings help to prevent blood clots and reduce swelling in your legs. It is important that these are the correct size. These stockings should be prescribed by your doctor to prevent possible injuries.  If elastic bandages or wraps are recommended, use them as told by your health care provider.  Medicines    Take over-the-counter and prescription medicines only as told by your health care provider.  Your health care provider may prescribe medicine to help your body get rid of excess water (diuretic). Take this medicine if you are told to take it.  General instructions    Eat a low-salt (low-sodium) diet as told by your health care provider. Sometimes, eating less salt may reduce swelling.  Pay attention to any changes in your symptoms.  Moisturize your skin daily to help prevent skin from cracking and draining.  Keep all follow-up visits. This is important.  Contact a health care provider if:  You have a fever.  You have swelling in only one leg.  You have increased swelling, redness, or pain in one or both of your legs.  You have drainage or sores at the area where you have edema.  Get help right away if:  You have edema that starts suddenly or is getting worse, especially if you are pregnant or have a medical condition.  You develop shortness of breath, especially when you are lying down.  You have pain in your chest or abdomen.  You feel weak.  You feel like you will faint.  These symptoms may be an emergency. Get help right away. Call 911.  Do not wait to see if the symptoms will go away.  Do not drive yourself to the hospital.  Summary  Peripheral edema is swelling that is caused by a buildup of fluid. Peripheral edema most often affects the lower legs, ankles, and feet.  Move around often to prevent stiffness and to reduce swelling. Do not sit or stand for long periods of time.  Pay attention to any changes in your symptoms.  Contact a health care provider if you have edema that starts suddenly or is getting worse, especially if you are pregnant or have a medical condition.  Get help right away if you develop shortness of breath, especially when lying down.  This information is not intended to replace advice given to you by your health care provider. Make sure you discuss any questions you have with your health care provider.    Document Revised: 08/22/2022 Document Reviewed: 08/22/2022  [employer] Patient Education © 2025 [employer]  [employer] logo  Terms and Conditions  Privacy Policy  Editorial Policy  All content on this site: Copyright © 2025 [employer], its licensors, and contributors. All rights are reserved, including those for text and data mining, AI training, and similar technologies. For all open access content, the Creative Commons licensing terms apply.  Cookies are used by this site. To decline or learn more, visit our Cookies page.  [employer]

## 2025-05-12 NOTE — ED PROVIDER NOTE - PATIENT PORTAL LINK FT
You can access the FollowMyHealth Patient Portal offered by [facility] by registering at the following website: [email or web address]. By joining [employer]’s FollowMyHealth portal, you will also be able to view your health information using other applications (apps) compatible with our system.

## 2025-05-13 ENCOUNTER — NON-APPOINTMENT (OUTPATIENT)
Age: 85
End: 2025-05-13

## 2025-05-13 ENCOUNTER — APPOINTMENT (OUTPATIENT)
Dept: CARDIOLOGY | Facility: CLINIC | Age: 85
End: 2025-05-13
Payer: MEDICARE

## 2025-05-13 VITALS
OXYGEN SATURATION: 98 % | DIASTOLIC BLOOD PRESSURE: 79 MMHG | HEART RATE: 91 BPM | HEIGHT: 63 IN | WEIGHT: 108 LBS | SYSTOLIC BLOOD PRESSURE: 122 MMHG | BODY MASS INDEX: 19.14 KG/M2

## 2025-05-13 DIAGNOSIS — I35.1 NONRHEUMATIC AORTIC (VALVE) INSUFFICIENCY: ICD-10-CM

## 2025-05-13 DIAGNOSIS — I48.0 PAROXYSMAL ATRIAL FIBRILLATION: ICD-10-CM

## 2025-05-13 PROCEDURE — G2211 COMPLEX E/M VISIT ADD ON: CPT

## 2025-05-13 PROCEDURE — 99215 OFFICE O/P EST HI 40 MIN: CPT

## 2025-05-13 PROCEDURE — 93000 ELECTROCARDIOGRAM COMPLETE: CPT

## 2025-05-15 RX ORDER — FUROSEMIDE 40 MG/1
40 TABLET ORAL
Qty: 90 | Refills: 2 | Status: ACTIVE | COMMUNITY
Start: 2025-05-09

## 2025-06-04 ENCOUNTER — APPOINTMENT (OUTPATIENT)
Dept: VASCULAR SURGERY | Facility: CLINIC | Age: 85
End: 2025-06-04
Payer: MEDICARE

## 2025-06-04 VITALS
RESPIRATION RATE: 16 BRPM | WEIGHT: 105.13 LBS | DIASTOLIC BLOOD PRESSURE: 72 MMHG | OXYGEN SATURATION: 99 % | HEIGHT: 64 IN | HEART RATE: 88 BPM | BODY MASS INDEX: 17.95 KG/M2 | TEMPERATURE: 98.3 F | SYSTOLIC BLOOD PRESSURE: 113 MMHG

## 2025-06-04 PROCEDURE — 99204 OFFICE O/P NEW MOD 45 MIN: CPT

## 2025-06-12 ENCOUNTER — APPOINTMENT (OUTPATIENT)
Dept: ELECTROPHYSIOLOGY | Facility: CLINIC | Age: 85
End: 2025-06-12

## 2025-06-12 PROCEDURE — 93298 REM INTERROG DEV EVAL SCRMS: CPT

## 2025-06-16 ENCOUNTER — APPOINTMENT (OUTPATIENT)
Dept: NEUROLOGY | Facility: CLINIC | Age: 85
End: 2025-06-16
Payer: MEDICARE

## 2025-06-16 VITALS
DIASTOLIC BLOOD PRESSURE: 69 MMHG | BODY MASS INDEX: 17.93 KG/M2 | OXYGEN SATURATION: 97 % | HEIGHT: 64 IN | WEIGHT: 105 LBS | SYSTOLIC BLOOD PRESSURE: 101 MMHG | HEART RATE: 95 BPM

## 2025-06-16 PROCEDURE — G2211 COMPLEX E/M VISIT ADD ON: CPT

## 2025-06-16 PROCEDURE — 99215 OFFICE O/P EST HI 40 MIN: CPT

## 2025-07-02 ENCOUNTER — APPOINTMENT (OUTPATIENT)
Dept: VASCULAR SURGERY | Facility: CLINIC | Age: 85
End: 2025-07-02

## 2025-07-02 VITALS
HEIGHT: 64 IN | BODY MASS INDEX: 17.93 KG/M2 | DIASTOLIC BLOOD PRESSURE: 71 MMHG | SYSTOLIC BLOOD PRESSURE: 104 MMHG | OXYGEN SATURATION: 95 % | TEMPERATURE: 98.4 F | RESPIRATION RATE: 16 BRPM | WEIGHT: 105 LBS | HEART RATE: 109 BPM

## 2025-07-02 PROCEDURE — 99213 OFFICE O/P EST LOW 20 MIN: CPT

## 2025-07-03 ENCOUNTER — NON-APPOINTMENT (OUTPATIENT)
Age: 85
End: 2025-07-03

## 2025-07-03 ENCOUNTER — APPOINTMENT (OUTPATIENT)
Dept: WOUND CARE | Facility: HOSPITAL | Age: 85
End: 2025-07-03
Payer: MEDICARE

## 2025-07-03 ENCOUNTER — OUTPATIENT (OUTPATIENT)
Dept: OUTPATIENT SERVICES | Facility: HOSPITAL | Age: 85
LOS: 1 days | End: 2025-07-03
Payer: MEDICARE

## 2025-07-03 VITALS
SYSTOLIC BLOOD PRESSURE: 120 MMHG | OXYGEN SATURATION: 97 % | DIASTOLIC BLOOD PRESSURE: 80 MMHG | RESPIRATION RATE: 16 BRPM | WEIGHT: 105 LBS | HEART RATE: 100 BPM | HEIGHT: 64 IN | TEMPERATURE: 98.1 F | BODY MASS INDEX: 17.93 KG/M2

## 2025-07-03 DIAGNOSIS — Z98.890 OTHER SPECIFIED POSTPROCEDURAL STATES: Chronic | ICD-10-CM

## 2025-07-03 DIAGNOSIS — L97.301 NON-PRESSURE CHRONIC ULCER OF UNSPECIFIED ANKLE LIMITED TO BREAKDOWN OF SKIN: ICD-10-CM

## 2025-07-03 DIAGNOSIS — Z98.49 CATARACT EXTRACTION STATUS, UNSPECIFIED EYE: Chronic | ICD-10-CM

## 2025-07-03 PROBLEM — Z80.0 FAMILY HISTORY OF MALIGNANT NEOPLASM OF COLON: Status: ACTIVE | Noted: 2025-07-03

## 2025-07-03 PROBLEM — K44.9 HIATAL HERNIA WITH GERD: Status: ACTIVE | Noted: 2025-07-03

## 2025-07-03 PROBLEM — S81.811A LACERATION OF RIGHT LOWER EXTREMITY, INITIAL ENCOUNTER: Status: ACTIVE | Noted: 2025-07-03

## 2025-07-03 PROBLEM — Z82.0 FAMILY HISTORY OF TIAS: Status: ACTIVE | Noted: 2025-07-03

## 2025-07-03 PROBLEM — I89.0 LYMPHEDEMA: Status: ACTIVE | Noted: 2025-06-09

## 2025-07-03 PROCEDURE — G0463: CPT

## 2025-07-03 PROCEDURE — 99213 OFFICE O/P EST LOW 20 MIN: CPT

## 2025-07-03 PROCEDURE — 99203 OFFICE O/P NEW LOW 30 MIN: CPT

## 2025-07-06 DIAGNOSIS — I10 ESSENTIAL (PRIMARY) HYPERTENSION: ICD-10-CM

## 2025-07-06 DIAGNOSIS — Z80.0 FAMILY HISTORY OF MALIGNANT NEOPLASM OF DIGESTIVE ORGANS: ICD-10-CM

## 2025-07-06 DIAGNOSIS — Z86.73 PERSONAL HISTORY OF TRANSIENT ISCHEMIC ATTACK (TIA), AND CEREBRAL INFARCTION WITHOUT RESIDUAL DEFICITS: ICD-10-CM

## 2025-07-06 DIAGNOSIS — I73.9 PERIPHERAL VASCULAR DISEASE, UNSPECIFIED: ICD-10-CM

## 2025-07-06 DIAGNOSIS — J43.9 EMPHYSEMA, UNSPECIFIED: ICD-10-CM

## 2025-07-06 DIAGNOSIS — Z79.01 LONG TERM (CURRENT) USE OF ANTICOAGULANTS: ICD-10-CM
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
## 2025-07-06 DIAGNOSIS — I48.0 PAROXYSMAL ATRIAL FIBRILLATION: ICD-10-CM

## 2025-07-06 DIAGNOSIS — Z88.5 ALLERGY STATUS TO NARCOTIC AGENT: ICD-10-CM

## 2025-07-06 DIAGNOSIS — M06.9 RHEUMATOID ARTHRITIS, UNSPECIFIED: ICD-10-CM

## 2025-07-06 DIAGNOSIS — S81.811D LACERATION WITHOUT FOREIGN BODY, RIGHT LOWER LEG, SUBSEQUENT ENCOUNTER: ICD-10-CM

## 2025-07-06 DIAGNOSIS — Z85.828 PERSONAL HISTORY OF OTHER MALIGNANT NEOPLASM OF SKIN: ICD-10-CM

## 2025-07-06 DIAGNOSIS — K21.9 GASTRO-ESOPHAGEAL REFLUX DISEASE WITHOUT ESOPHAGITIS: ICD-10-CM

## 2025-07-06 DIAGNOSIS — Z86.69 PERSONAL HISTORY OF OTHER DISEASES OF THE NERVOUS SYSTEM AND SENSE ORGANS: ICD-10-CM
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
## 2025-07-06 DIAGNOSIS — Z98.890 OTHER SPECIFIED POSTPROCEDURAL STATES: ICD-10-CM

## 2025-07-06 DIAGNOSIS — Z79.899 OTHER LONG TERM (CURRENT) DRUG THERAPY: ICD-10-CM

## 2025-07-06 DIAGNOSIS — Z88.0 ALLERGY STATUS TO PENICILLIN: ICD-10-CM

## 2025-07-06 DIAGNOSIS — E78.5 HYPERLIPIDEMIA, UNSPECIFIED: ICD-10-CM

## 2025-07-06 DIAGNOSIS — Z82.0 FAMILY HISTORY OF EPILEPSY AND OTHER DISEASES OF THE NERVOUS SYSTEM: ICD-10-CM

## 2025-07-06 DIAGNOSIS — Z87.891 PERSONAL HISTORY OF NICOTINE DEPENDENCE: ICD-10-CM

## 2025-07-11 ENCOUNTER — OUTPATIENT (OUTPATIENT)
Dept: OUTPATIENT SERVICES | Facility: HOSPITAL | Age: 85
LOS: 1 days | End: 2025-07-11
Payer: MEDICARE

## 2025-07-11 ENCOUNTER — APPOINTMENT (OUTPATIENT)
Dept: WOUND CARE | Facility: HOSPITAL | Age: 85
End: 2025-07-11
Payer: MEDICARE

## 2025-07-11 VITALS
HEART RATE: 90 BPM | HEIGHT: 64 IN | BODY MASS INDEX: 17.93 KG/M2 | TEMPERATURE: 98.3 F | RESPIRATION RATE: 16 BRPM | WEIGHT: 105 LBS | DIASTOLIC BLOOD PRESSURE: 82 MMHG | OXYGEN SATURATION: 98 % | SYSTOLIC BLOOD PRESSURE: 122 MMHG

## 2025-07-11 DIAGNOSIS — L97.301 NON-PRESSURE CHRONIC ULCER OF UNSPECIFIED ANKLE LIMITED TO BREAKDOWN OF SKIN: ICD-10-CM

## 2025-07-11 DIAGNOSIS — Z98.890 OTHER SPECIFIED POSTPROCEDURAL STATES: Chronic | ICD-10-CM

## 2025-07-11 DIAGNOSIS — Z98.49 CATARACT EXTRACTION STATUS, UNSPECIFIED EYE: Chronic | ICD-10-CM

## 2025-07-11 PROBLEM — E78.5 HYPERLIPIDEMIA, ACQUIRED: Status: ACTIVE | Noted: 2025-07-11

## 2025-07-11 PROCEDURE — G0463: CPT

## 2025-07-11 PROCEDURE — 99213 OFFICE O/P EST LOW 20 MIN: CPT

## 2025-07-11 RX ORDER — SULFAMETHOXAZOLE AND TRIMETHOPRIM 800; 160 MG/1; MG/1
800-160 TABLET ORAL TWICE DAILY
Qty: 14 | Refills: 0 | Status: COMPLETED | COMMUNITY
Start: 2025-07-11 | End: 2025-07-18

## 2025-07-13 DIAGNOSIS — Z87.891 PERSONAL HISTORY OF NICOTINE DEPENDENCE: ICD-10-CM
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
## 2025-07-13 DIAGNOSIS — S81.811D LACERATION WITHOUT FOREIGN BODY, RIGHT LOWER LEG, SUBSEQUENT ENCOUNTER: ICD-10-CM

## 2025-07-13 DIAGNOSIS — Z86.73 PERSONAL HISTORY OF TRANSIENT ISCHEMIC ATTACK (TIA), AND CEREBRAL INFARCTION WITHOUT RESIDUAL DEFICITS: ICD-10-CM

## 2025-07-13 DIAGNOSIS — Z85.828 PERSONAL HISTORY OF OTHER MALIGNANT NEOPLASM OF SKIN: ICD-10-CM

## 2025-07-13 DIAGNOSIS — I73.9 PERIPHERAL VASCULAR DISEASE, UNSPECIFIED: ICD-10-CM
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
## 2025-07-13 DIAGNOSIS — Z88.5 ALLERGY STATUS TO NARCOTIC AGENT: ICD-10-CM

## 2025-07-13 DIAGNOSIS — Z82.0 FAMILY HISTORY OF EPILEPSY AND OTHER DISEASES OF THE NERVOUS SYSTEM: ICD-10-CM

## 2025-07-13 DIAGNOSIS — Z86.69 PERSONAL HISTORY OF OTHER DISEASES OF THE NERVOUS SYSTEM AND SENSE ORGANS: ICD-10-CM
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
## 2025-07-13 DIAGNOSIS — Z88.0 ALLERGY STATUS TO PENICILLIN: ICD-10-CM

## 2025-07-13 DIAGNOSIS — M06.9 RHEUMATOID ARTHRITIS, UNSPECIFIED: ICD-10-CM

## 2025-07-13 DIAGNOSIS — J43.9 EMPHYSEMA, UNSPECIFIED: ICD-10-CM

## 2025-07-13 DIAGNOSIS — I48.0 PAROXYSMAL ATRIAL FIBRILLATION: ICD-10-CM

## 2025-07-13 DIAGNOSIS — Z79.899 OTHER LONG TERM (CURRENT) DRUG THERAPY: ICD-10-CM

## 2025-07-13 DIAGNOSIS — Z98.890 OTHER SPECIFIED POSTPROCEDURAL STATES: ICD-10-CM

## 2025-07-13 DIAGNOSIS — E78.5 HYPERLIPIDEMIA, UNSPECIFIED: ICD-10-CM
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
## 2025-07-13 DIAGNOSIS — K21.9 GASTRO-ESOPHAGEAL REFLUX DISEASE WITHOUT ESOPHAGITIS: ICD-10-CM

## 2025-07-13 DIAGNOSIS — Z79.01 LONG TERM (CURRENT) USE OF ANTICOAGULANTS: ICD-10-CM

## 2025-07-13 DIAGNOSIS — Z80.0 FAMILY HISTORY OF MALIGNANT NEOPLASM OF DIGESTIVE ORGANS: ICD-10-CM

## 2025-07-15 ENCOUNTER — OUTPATIENT (OUTPATIENT)
Dept: OUTPATIENT SERVICES | Facility: HOSPITAL | Age: 85
LOS: 1 days | End: 2025-07-15
Payer: MEDICARE

## 2025-07-15 ENCOUNTER — APPOINTMENT (OUTPATIENT)
Dept: WOUND CARE | Facility: HOSPITAL | Age: 85
End: 2025-07-15
Payer: MEDICARE

## 2025-07-15 VITALS
SYSTOLIC BLOOD PRESSURE: 114 MMHG | DIASTOLIC BLOOD PRESSURE: 75 MMHG | BODY MASS INDEX: 17.93 KG/M2 | HEART RATE: 78 BPM | TEMPERATURE: 97.8 F | HEIGHT: 64 IN | OXYGEN SATURATION: 94 % | RESPIRATION RATE: 16 BRPM | WEIGHT: 105 LBS

## 2025-07-15 DIAGNOSIS — Z98.49 CATARACT EXTRACTION STATUS, UNSPECIFIED EYE: Chronic | ICD-10-CM

## 2025-07-15 DIAGNOSIS — L97.301 NON-PRESSURE CHRONIC ULCER OF UNSPECIFIED ANKLE LIMITED TO BREAKDOWN OF SKIN: ICD-10-CM

## 2025-07-15 DIAGNOSIS — Z98.890 OTHER SPECIFIED POSTPROCEDURAL STATES: Chronic | ICD-10-CM

## 2025-07-15 PROBLEM — I73.9: Status: ACTIVE | Noted: 2025-07-11

## 2025-07-15 PROBLEM — T31.0: Status: ACTIVE | Noted: 2025-07-15

## 2025-07-15 PROBLEM — T24.231D: Status: ACTIVE | Noted: 2025-07-15

## 2025-07-15 PROCEDURE — 99213 OFFICE O/P EST LOW 20 MIN: CPT

## 2025-07-15 PROCEDURE — G0463: CPT

## 2025-07-16 DIAGNOSIS — Z98.890 OTHER SPECIFIED POSTPROCEDURAL STATES: ICD-10-CM

## 2025-07-16 DIAGNOSIS — S81.811D LACERATION WITHOUT FOREIGN BODY, RIGHT LOWER LEG, SUBSEQUENT ENCOUNTER: ICD-10-CM

## 2025-07-16 DIAGNOSIS — I48.0 PAROXYSMAL ATRIAL FIBRILLATION: ICD-10-CM

## 2025-07-16 DIAGNOSIS — Z80.0 FAMILY HISTORY OF MALIGNANT NEOPLASM OF DIGESTIVE ORGANS: ICD-10-CM

## 2025-07-16 DIAGNOSIS — Z86.73 PERSONAL HISTORY OF TRANSIENT ISCHEMIC ATTACK (TIA), AND CEREBRAL INFARCTION WITHOUT RESIDUAL DEFICITS: ICD-10-CM
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
## 2025-07-16 DIAGNOSIS — Z79.01 LONG TERM (CURRENT) USE OF ANTICOAGULANTS: ICD-10-CM

## 2025-07-16 DIAGNOSIS — Z79.899 OTHER LONG TERM (CURRENT) DRUG THERAPY: ICD-10-CM

## 2025-07-16 DIAGNOSIS — M06.9 RHEUMATOID ARTHRITIS, UNSPECIFIED: ICD-10-CM

## 2025-07-16 DIAGNOSIS — Z82.0 FAMILY HISTORY OF EPILEPSY AND OTHER DISEASES OF THE NERVOUS SYSTEM: ICD-10-CM

## 2025-07-16 DIAGNOSIS — K21.9 GASTRO-ESOPHAGEAL REFLUX DISEASE WITHOUT ESOPHAGITIS: ICD-10-CM

## 2025-07-16 DIAGNOSIS — I73.9 PERIPHERAL VASCULAR DISEASE, UNSPECIFIED: ICD-10-CM

## 2025-07-16 DIAGNOSIS — Z86.69 PERSONAL HISTORY OF OTHER DISEASES OF THE NERVOUS SYSTEM AND SENSE ORGANS: ICD-10-CM

## 2025-07-16 DIAGNOSIS — Z88.5 ALLERGY STATUS TO NARCOTIC AGENT: ICD-10-CM

## 2025-07-16 DIAGNOSIS — Z88.0 ALLERGY STATUS TO PENICILLIN: ICD-10-CM

## 2025-07-16 DIAGNOSIS — Z87.891 PERSONAL HISTORY OF NICOTINE DEPENDENCE: ICD-10-CM
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
## 2025-07-16 DIAGNOSIS — J43.9 EMPHYSEMA, UNSPECIFIED: ICD-10-CM
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
## 2025-07-16 DIAGNOSIS — Z85.828 PERSONAL HISTORY OF OTHER MALIGNANT NEOPLASM OF SKIN: ICD-10-CM

## 2025-07-16 DIAGNOSIS — E78.5 HYPERLIPIDEMIA, UNSPECIFIED: ICD-10-CM

## 2025-07-17 ENCOUNTER — NON-APPOINTMENT (OUTPATIENT)
Age: 85
End: 2025-07-17

## 2025-07-17 ENCOUNTER — APPOINTMENT (OUTPATIENT)
Dept: ELECTROPHYSIOLOGY | Facility: CLINIC | Age: 85
End: 2025-07-17

## 2025-07-17 PROCEDURE — 93298 REM INTERROG DEV EVAL SCRMS: CPT

## 2025-07-22 ENCOUNTER — NON-APPOINTMENT (OUTPATIENT)
Age: 85
End: 2025-07-22

## 2025-07-22 ENCOUNTER — APPOINTMENT (OUTPATIENT)
Dept: WOUND CARE | Facility: HOSPITAL | Age: 85
End: 2025-07-22
Payer: MEDICARE

## 2025-07-22 ENCOUNTER — OUTPATIENT (OUTPATIENT)
Dept: OUTPATIENT SERVICES | Facility: HOSPITAL | Age: 85
LOS: 1 days | End: 2025-07-22
Payer: MEDICARE

## 2025-07-22 VITALS
DIASTOLIC BLOOD PRESSURE: 76 MMHG | HEIGHT: 64 IN | BODY MASS INDEX: 17.93 KG/M2 | HEART RATE: 85 BPM | OXYGEN SATURATION: 95 % | RESPIRATION RATE: 18 BRPM | SYSTOLIC BLOOD PRESSURE: 118 MMHG | TEMPERATURE: 97.5 F | WEIGHT: 105 LBS

## 2025-07-22 DIAGNOSIS — Z98.49 CATARACT EXTRACTION STATUS, UNSPECIFIED EYE: Chronic | ICD-10-CM

## 2025-07-22 DIAGNOSIS — Z98.890 OTHER SPECIFIED POSTPROCEDURAL STATES: Chronic | ICD-10-CM

## 2025-07-22 DIAGNOSIS — L97.301 NON-PRESSURE CHRONIC ULCER OF UNSPECIFIED ANKLE LIMITED TO BREAKDOWN OF SKIN: ICD-10-CM

## 2025-07-22 PROCEDURE — G0463: CPT

## 2025-07-22 PROCEDURE — 99213 OFFICE O/P EST LOW 20 MIN: CPT

## 2025-07-23 DIAGNOSIS — S81.811D LACERATION WITHOUT FOREIGN BODY, RIGHT LOWER LEG, SUBSEQUENT ENCOUNTER: ICD-10-CM

## 2025-07-23 DIAGNOSIS — Z85.828 PERSONAL HISTORY OF OTHER MALIGNANT NEOPLASM OF SKIN: ICD-10-CM

## 2025-07-23 DIAGNOSIS — Z82.0 FAMILY HISTORY OF EPILEPSY AND OTHER DISEASES OF THE NERVOUS SYSTEM: ICD-10-CM

## 2025-07-23 DIAGNOSIS — Z88.5 ALLERGY STATUS TO NARCOTIC AGENT: ICD-10-CM
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
## 2025-07-23 DIAGNOSIS — Z86.73 PERSONAL HISTORY OF TRANSIENT ISCHEMIC ATTACK (TIA), AND CEREBRAL INFARCTION WITHOUT RESIDUAL DEFICITS: ICD-10-CM

## 2025-07-23 DIAGNOSIS — I48.0 PAROXYSMAL ATRIAL FIBRILLATION: ICD-10-CM
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
## 2025-07-23 DIAGNOSIS — E78.5 HYPERLIPIDEMIA, UNSPECIFIED: ICD-10-CM

## 2025-07-23 DIAGNOSIS — Z80.0 FAMILY HISTORY OF MALIGNANT NEOPLASM OF DIGESTIVE ORGANS: ICD-10-CM

## 2025-07-23 DIAGNOSIS — Z98.890 OTHER SPECIFIED POSTPROCEDURAL STATES: ICD-10-CM

## 2025-07-23 DIAGNOSIS — Z79.01 LONG TERM (CURRENT) USE OF ANTICOAGULANTS: ICD-10-CM

## 2025-07-23 DIAGNOSIS — Z87.891 PERSONAL HISTORY OF NICOTINE DEPENDENCE: ICD-10-CM

## 2025-07-23 DIAGNOSIS — Z86.69 PERSONAL HISTORY OF OTHER DISEASES OF THE NERVOUS SYSTEM AND SENSE ORGANS: ICD-10-CM
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
## 2025-07-23 DIAGNOSIS — K21.9 GASTRO-ESOPHAGEAL REFLUX DISEASE WITHOUT ESOPHAGITIS: ICD-10-CM

## 2025-07-23 DIAGNOSIS — Z88.0 ALLERGY STATUS TO PENICILLIN: ICD-10-CM

## 2025-07-23 DIAGNOSIS — J43.9 EMPHYSEMA, UNSPECIFIED: ICD-10-CM
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
## 2025-07-23 DIAGNOSIS — Z79.899 OTHER LONG TERM (CURRENT) DRUG THERAPY: ICD-10-CM

## 2025-07-29 ENCOUNTER — APPOINTMENT (OUTPATIENT)
Dept: WOUND CARE | Facility: HOSPITAL | Age: 85
End: 2025-07-29
Payer: MEDICARE

## 2025-07-29 ENCOUNTER — OUTPATIENT (OUTPATIENT)
Dept: OUTPATIENT SERVICES | Facility: HOSPITAL | Age: 85
LOS: 1 days | End: 2025-07-29
Payer: MEDICARE

## 2025-07-29 VITALS
BODY MASS INDEX: 17.93 KG/M2 | DIASTOLIC BLOOD PRESSURE: 67 MMHG | WEIGHT: 105 LBS | HEIGHT: 64 IN | TEMPERATURE: 97.8 F | SYSTOLIC BLOOD PRESSURE: 106 MMHG | HEART RATE: 80 BPM | RESPIRATION RATE: 18 BRPM | OXYGEN SATURATION: 94 %

## 2025-07-29 DIAGNOSIS — J43.9 EMPHYSEMA, UNSPECIFIED: ICD-10-CM
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
## 2025-07-29 DIAGNOSIS — Z86.73 PERSONAL HISTORY OF TRANSIENT ISCHEMIC ATTACK (TIA), AND CEREBRAL INFARCTION WITHOUT RESIDUAL DEFICITS: ICD-10-CM

## 2025-07-29 DIAGNOSIS — Z98.890 OTHER SPECIFIED POSTPROCEDURAL STATES: Chronic | ICD-10-CM

## 2025-07-29 DIAGNOSIS — E78.5 HYPERLIPIDEMIA, UNSPECIFIED: ICD-10-CM
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
## 2025-07-29 DIAGNOSIS — K21.9 GASTRO-ESOPHAGEAL REFLUX DISEASE WITHOUT ESOPHAGITIS: ICD-10-CM

## 2025-07-29 DIAGNOSIS — I48.0 PAROXYSMAL ATRIAL FIBRILLATION: ICD-10-CM
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
## 2025-07-29 DIAGNOSIS — S81.811D LACERATION WITHOUT FOREIGN BODY, RIGHT LOWER LEG, SUBSEQUENT ENCOUNTER: ICD-10-CM

## 2025-07-29 DIAGNOSIS — M06.9 RHEUMATOID ARTHRITIS, UNSPECIFIED: ICD-10-CM

## 2025-07-29 DIAGNOSIS — Z98.890 OTHER SPECIFIED POSTPROCEDURAL STATES: ICD-10-CM

## 2025-07-29 DIAGNOSIS — L97.301 NON-PRESSURE CHRONIC ULCER OF UNSPECIFIED ANKLE LIMITED TO BREAKDOWN OF SKIN: ICD-10-CM

## 2025-07-29 DIAGNOSIS — Z80.0 FAMILY HISTORY OF MALIGNANT NEOPLASM OF DIGESTIVE ORGANS: ICD-10-CM
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
## 2025-07-29 DIAGNOSIS — Z85.828 PERSONAL HISTORY OF OTHER MALIGNANT NEOPLASM OF SKIN: ICD-10-CM

## 2025-07-29 DIAGNOSIS — Z79.01 LONG TERM (CURRENT) USE OF ANTICOAGULANTS: ICD-10-CM

## 2025-07-29 DIAGNOSIS — Z86.69 PERSONAL HISTORY OF OTHER DISEASES OF THE NERVOUS SYSTEM AND SENSE ORGANS: ICD-10-CM

## 2025-07-29 DIAGNOSIS — Z88.0 ALLERGY STATUS TO PENICILLIN: ICD-10-CM

## 2025-07-29 DIAGNOSIS — Z87.891 PERSONAL HISTORY OF NICOTINE DEPENDENCE: ICD-10-CM

## 2025-07-29 DIAGNOSIS — Z98.49 CATARACT EXTRACTION STATUS, UNSPECIFIED EYE: Chronic | ICD-10-CM

## 2025-07-29 DIAGNOSIS — Z82.0 FAMILY HISTORY OF EPILEPSY AND OTHER DISEASES OF THE NERVOUS SYSTEM: ICD-10-CM

## 2025-07-29 DIAGNOSIS — Z88.5 ALLERGY STATUS TO NARCOTIC AGENT: ICD-10-CM

## 2025-07-29 DIAGNOSIS — Z79.899 OTHER LONG TERM (CURRENT) DRUG THERAPY: ICD-10-CM

## 2025-07-29 PROCEDURE — 99203 OFFICE O/P NEW LOW 30 MIN: CPT

## 2025-07-29 PROCEDURE — G0463: CPT

## 2025-08-12 ENCOUNTER — APPOINTMENT (OUTPATIENT)
Dept: WOUND CARE | Facility: HOSPITAL | Age: 85
End: 2025-08-12
Payer: MEDICARE

## 2025-08-12 ENCOUNTER — OUTPATIENT (OUTPATIENT)
Dept: OUTPATIENT SERVICES | Facility: HOSPITAL | Age: 85
LOS: 1 days | End: 2025-08-12
Payer: MEDICARE

## 2025-08-12 VITALS
BODY MASS INDEX: 17.93 KG/M2 | OXYGEN SATURATION: 95 % | WEIGHT: 105 LBS | HEIGHT: 64 IN | SYSTOLIC BLOOD PRESSURE: 115 MMHG | HEART RATE: 86 BPM | DIASTOLIC BLOOD PRESSURE: 62 MMHG | TEMPERATURE: 98.4 F | RESPIRATION RATE: 18 BRPM

## 2025-08-12 DIAGNOSIS — Z98.49 CATARACT EXTRACTION STATUS, UNSPECIFIED EYE: Chronic | ICD-10-CM

## 2025-08-12 DIAGNOSIS — M06.9 RHEUMATOID ARTHRITIS, UNSPECIFIED: ICD-10-CM

## 2025-08-12 DIAGNOSIS — Z98.890 OTHER SPECIFIED POSTPROCEDURAL STATES: Chronic | ICD-10-CM

## 2025-08-12 DIAGNOSIS — L97.301 NON-PRESSURE CHRONIC ULCER OF UNSPECIFIED ANKLE LIMITED TO BREAKDOWN OF SKIN: ICD-10-CM

## 2025-08-12 PROBLEM — S81.811D LACERATION OF LEG, RIGHT, SUBSEQUENT ENCOUNTER: Status: ACTIVE | Noted: 2025-07-03

## 2025-08-12 PROCEDURE — 99213 OFFICE O/P EST LOW 20 MIN: CPT

## 2025-08-12 PROCEDURE — G0463: CPT

## 2025-08-12 RX ORDER — CLOTRIMAZOLE AND BETAMETHASONE DIPROPIONATE 10; .5 MG/G; MG/G
1-0.05 CREAM TOPICAL
Qty: 45 | Refills: 2 | Status: ACTIVE | COMMUNITY
Start: 2025-08-12 | End: 1900-01-01
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
## 2025-08-13 DIAGNOSIS — Z86.69 PERSONAL HISTORY OF OTHER DISEASES OF THE NERVOUS SYSTEM AND SENSE ORGANS: ICD-10-CM

## 2025-08-13 DIAGNOSIS — Z82.0 FAMILY HISTORY OF EPILEPSY AND OTHER DISEASES OF THE NERVOUS SYSTEM: ICD-10-CM

## 2025-08-13 DIAGNOSIS — Z98.890 OTHER SPECIFIED POSTPROCEDURAL STATES: ICD-10-CM

## 2025-08-13 DIAGNOSIS — Z79.899 OTHER LONG TERM (CURRENT) DRUG THERAPY: ICD-10-CM
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
## 2025-08-13 DIAGNOSIS — Z85.828 PERSONAL HISTORY OF OTHER MALIGNANT NEOPLASM OF SKIN: ICD-10-CM

## 2025-08-13 DIAGNOSIS — I48.0 PAROXYSMAL ATRIAL FIBRILLATION: ICD-10-CM

## 2025-08-13 DIAGNOSIS — R60.9 EDEMA, UNSPECIFIED: ICD-10-CM
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
## 2025-08-13 DIAGNOSIS — Z86.73 PERSONAL HISTORY OF TRANSIENT ISCHEMIC ATTACK (TIA), AND CEREBRAL INFARCTION WITHOUT RESIDUAL DEFICITS: ICD-10-CM
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
## 2025-08-13 DIAGNOSIS — Z79.01 LONG TERM (CURRENT) USE OF ANTICOAGULANTS: ICD-10-CM

## 2025-08-13 DIAGNOSIS — K21.9 GASTRO-ESOPHAGEAL REFLUX DISEASE WITHOUT ESOPHAGITIS: ICD-10-CM

## 2025-08-13 DIAGNOSIS — J43.9 EMPHYSEMA, UNSPECIFIED: ICD-10-CM

## 2025-08-13 DIAGNOSIS — Z88.0 ALLERGY STATUS TO PENICILLIN: ICD-10-CM

## 2025-08-13 DIAGNOSIS — S81.811D LACERATION WITHOUT FOREIGN BODY, RIGHT LOWER LEG, SUBSEQUENT ENCOUNTER: ICD-10-CM

## 2025-08-13 DIAGNOSIS — Z87.891 PERSONAL HISTORY OF NICOTINE DEPENDENCE: ICD-10-CM

## 2025-08-13 DIAGNOSIS — Z88.5 ALLERGY STATUS TO NARCOTIC AGENT: ICD-10-CM

## 2025-08-13 DIAGNOSIS — Z80.0 FAMILY HISTORY OF MALIGNANT NEOPLASM OF DIGESTIVE ORGANS: ICD-10-CM

## 2025-08-13 DIAGNOSIS — E78.5 HYPERLIPIDEMIA, UNSPECIFIED: ICD-10-CM

## 2025-08-19 ENCOUNTER — NON-APPOINTMENT (OUTPATIENT)
Age: 85
End: 2025-08-19

## 2025-08-19 ENCOUNTER — APPOINTMENT (OUTPATIENT)
Dept: WOUND CARE | Facility: HOSPITAL | Age: 85
End: 2025-08-19
Payer: MEDICARE

## 2025-08-19 ENCOUNTER — OUTPATIENT (OUTPATIENT)
Dept: OUTPATIENT SERVICES | Facility: HOSPITAL | Age: 85
LOS: 1 days | End: 2025-08-19
Payer: MEDICARE

## 2025-08-19 VITALS
DIASTOLIC BLOOD PRESSURE: 72 MMHG | HEART RATE: 89 BPM | RESPIRATION RATE: 18 BRPM | TEMPERATURE: 97.9 F | WEIGHT: 105 LBS | BODY MASS INDEX: 17.93 KG/M2 | SYSTOLIC BLOOD PRESSURE: 109 MMHG | OXYGEN SATURATION: 94 % | HEIGHT: 64 IN

## 2025-08-19 DIAGNOSIS — Z98.49 CATARACT EXTRACTION STATUS, UNSPECIFIED EYE: Chronic | ICD-10-CM

## 2025-08-19 DIAGNOSIS — Z98.890 OTHER SPECIFIED POSTPROCEDURAL STATES: Chronic | ICD-10-CM

## 2025-08-19 DIAGNOSIS — L97.301 NON-PRESSURE CHRONIC ULCER OF UNSPECIFIED ANKLE LIMITED TO BREAKDOWN OF SKIN: ICD-10-CM

## 2025-08-19 DIAGNOSIS — S81.811D LACERATION W/OUT FOREIGN BODY, RIGHT LOWER LEG, SUBSEQUENT ENCOUNTER: ICD-10-CM

## 2025-08-19 PROCEDURE — G0463: CPT

## 2025-08-19 PROCEDURE — 99213 OFFICE O/P EST LOW 20 MIN: CPT

## 2025-08-20 DIAGNOSIS — I48.0 PAROXYSMAL ATRIAL FIBRILLATION: ICD-10-CM

## 2025-08-20 DIAGNOSIS — Z88.5 ALLERGY STATUS TO NARCOTIC AGENT: ICD-10-CM
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
## 2025-08-20 DIAGNOSIS — Z98.890 OTHER SPECIFIED POSTPROCEDURAL STATES: ICD-10-CM

## 2025-08-20 DIAGNOSIS — K21.9 GASTRO-ESOPHAGEAL REFLUX DISEASE WITHOUT ESOPHAGITIS: ICD-10-CM

## 2025-08-20 DIAGNOSIS — Z86.73 PERSONAL HISTORY OF TRANSIENT ISCHEMIC ATTACK (TIA), AND CEREBRAL INFARCTION WITHOUT RESIDUAL DEFICITS: ICD-10-CM

## 2025-08-20 DIAGNOSIS — Z80.0 FAMILY HISTORY OF MALIGNANT NEOPLASM OF DIGESTIVE ORGANS: ICD-10-CM

## 2025-08-20 DIAGNOSIS — J43.9 EMPHYSEMA, UNSPECIFIED: ICD-10-CM

## 2025-08-20 DIAGNOSIS — Z79.01 LONG TERM (CURRENT) USE OF ANTICOAGULANTS: ICD-10-CM

## 2025-08-20 DIAGNOSIS — Z82.0 FAMILY HISTORY OF EPILEPSY AND OTHER DISEASES OF THE NERVOUS SYSTEM: ICD-10-CM

## 2025-08-20 DIAGNOSIS — Z85.828 PERSONAL HISTORY OF OTHER MALIGNANT NEOPLASM OF SKIN: ICD-10-CM

## 2025-08-20 DIAGNOSIS — E78.5 HYPERLIPIDEMIA, UNSPECIFIED: ICD-10-CM

## 2025-08-20 DIAGNOSIS — S81.811D LACERATION WITHOUT FOREIGN BODY, RIGHT LOWER LEG, SUBSEQUENT ENCOUNTER: ICD-10-CM

## 2025-08-20 DIAGNOSIS — Z88.0 ALLERGY STATUS TO PENICILLIN: ICD-10-CM

## 2025-08-20 DIAGNOSIS — Z87.891 PERSONAL HISTORY OF NICOTINE DEPENDENCE: ICD-10-CM

## 2025-08-20 DIAGNOSIS — Z86.69 PERSONAL HISTORY OF OTHER DISEASES OF THE NERVOUS SYSTEM AND SENSE ORGANS: ICD-10-CM
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
## 2025-08-20 DIAGNOSIS — Z79.899 OTHER LONG TERM (CURRENT) DRUG THERAPY: ICD-10-CM
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
## 2025-08-21 ENCOUNTER — APPOINTMENT (OUTPATIENT)
Dept: ELECTROPHYSIOLOGY | Facility: CLINIC | Age: 85
End: 2025-08-21

## 2025-08-21 PROCEDURE — 93298 REM INTERROG DEV EVAL SCRMS: CPT

## 2025-09-02 ENCOUNTER — APPOINTMENT (OUTPATIENT)
Dept: WOUND CARE | Facility: HOSPITAL | Age: 85
End: 2025-09-02
Payer: MEDICARE

## 2025-09-02 ENCOUNTER — NON-APPOINTMENT (OUTPATIENT)
Age: 85
End: 2025-09-02

## 2025-09-02 ENCOUNTER — OUTPATIENT (OUTPATIENT)
Dept: OUTPATIENT SERVICES | Facility: HOSPITAL | Age: 85
LOS: 1 days | End: 2025-09-02
Payer: MEDICARE

## 2025-09-02 DIAGNOSIS — K21.9 GASTRO-ESOPHAGEAL REFLUX DISEASE WITHOUT ESOPHAGITIS: ICD-10-CM

## 2025-09-02 DIAGNOSIS — S81.811D LACERATION WITHOUT FOREIGN BODY, RIGHT LOWER LEG, SUBSEQUENT ENCOUNTER: ICD-10-CM

## 2025-09-02 DIAGNOSIS — R60.0 LOCALIZED EDEMA: ICD-10-CM

## 2025-09-02 DIAGNOSIS — Z80.0 FAMILY HISTORY OF MALIGNANT NEOPLASM OF DIGESTIVE ORGANS: ICD-10-CM

## 2025-09-02 DIAGNOSIS — Z98.49 CATARACT EXTRACTION STATUS, UNSPECIFIED EYE: Chronic | ICD-10-CM

## 2025-09-02 DIAGNOSIS — Z79.899 OTHER LONG TERM (CURRENT) DRUG THERAPY: ICD-10-CM

## 2025-09-02 DIAGNOSIS — E78.5 HYPERLIPIDEMIA, UNSPECIFIED: ICD-10-CM

## 2025-09-02 DIAGNOSIS — Z85.828 PERSONAL HISTORY OF OTHER MALIGNANT NEOPLASM OF SKIN: ICD-10-CM

## 2025-09-02 DIAGNOSIS — Z86.73 PERSONAL HISTORY OF TRANSIENT ISCHEMIC ATTACK (TIA), AND CEREBRAL INFARCTION WITHOUT RESIDUAL DEFICITS: ICD-10-CM
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
## 2025-09-02 DIAGNOSIS — Z82.0 FAMILY HISTORY OF EPILEPSY AND OTHER DISEASES OF THE NERVOUS SYSTEM: ICD-10-CM

## 2025-09-02 DIAGNOSIS — J43.9 EMPHYSEMA, UNSPECIFIED: ICD-10-CM

## 2025-09-02 DIAGNOSIS — Z86.69 PERSONAL HISTORY OF OTHER DISEASES OF THE NERVOUS SYSTEM AND SENSE ORGANS: ICD-10-CM

## 2025-09-02 DIAGNOSIS — Z79.01 LONG TERM (CURRENT) USE OF ANTICOAGULANTS: ICD-10-CM

## 2025-09-02 DIAGNOSIS — Z87.891 PERSONAL HISTORY OF NICOTINE DEPENDENCE: ICD-10-CM

## 2025-09-02 DIAGNOSIS — I48.0 PAROXYSMAL ATRIAL FIBRILLATION: ICD-10-CM
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
## 2025-09-02 DIAGNOSIS — S81.811D LACERATION W/OUT FOREIGN BODY, RIGHT LOWER LEG, SUBSEQUENT ENCOUNTER: ICD-10-CM

## 2025-09-02 DIAGNOSIS — Z88.0 ALLERGY STATUS TO PENICILLIN: ICD-10-CM
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
## 2025-09-02 DIAGNOSIS — Z98.890 OTHER SPECIFIED POSTPROCEDURAL STATES: ICD-10-CM
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
## 2025-09-02 DIAGNOSIS — Z88.5 ALLERGY STATUS TO NARCOTIC AGENT: ICD-10-CM

## 2025-09-02 PROCEDURE — G0463: CPT

## 2025-09-02 PROCEDURE — 99213 OFFICE O/P EST LOW 20 MIN: CPT

## 2025-09-15 ENCOUNTER — APPOINTMENT (OUTPATIENT)
Dept: WOUND CARE | Facility: HOSPITAL | Age: 85
End: 2025-09-15
Payer: MEDICARE

## 2025-09-15 VITALS
HEART RATE: 89 BPM | BODY MASS INDEX: 17.93 KG/M2 | OXYGEN SATURATION: 95 % | RESPIRATION RATE: 18 BRPM | HEIGHT: 64 IN | WEIGHT: 105 LBS | DIASTOLIC BLOOD PRESSURE: 68 MMHG | TEMPERATURE: 98.1 F | SYSTOLIC BLOOD PRESSURE: 100 MMHG

## 2025-09-15 DIAGNOSIS — S81.811D LACERATION W/OUT FOREIGN BODY, RIGHT LOWER LEG, SUBSEQUENT ENCOUNTER: ICD-10-CM

## 2025-09-15 DIAGNOSIS — R60.0 LOCALIZED EDEMA: ICD-10-CM

## 2025-09-15 PROCEDURE — 99213 OFFICE O/P EST LOW 20 MIN: CPT
